# Patient Record
Sex: MALE | Race: WHITE
[De-identification: names, ages, dates, MRNs, and addresses within clinical notes are randomized per-mention and may not be internally consistent; named-entity substitution may affect disease eponyms.]

---

## 2017-01-09 ENCOUNTER — HOSPITAL ENCOUNTER (OUTPATIENT)
Dept: HOSPITAL 47 - ORWHC2ENDO | Age: 77
Discharge: HOME | End: 2017-01-09
Attending: SURGERY
Payer: COMMERCIAL

## 2017-01-09 VITALS — SYSTOLIC BLOOD PRESSURE: 111 MMHG | DIASTOLIC BLOOD PRESSURE: 66 MMHG | HEART RATE: 62 BPM

## 2017-01-09 VITALS — BODY MASS INDEX: 28.5 KG/M2

## 2017-01-09 VITALS — RESPIRATION RATE: 16 BRPM | TEMPERATURE: 97 F

## 2017-01-09 DIAGNOSIS — F03.90: ICD-10-CM

## 2017-01-09 DIAGNOSIS — F41.0: ICD-10-CM

## 2017-01-09 DIAGNOSIS — F41.9: ICD-10-CM

## 2017-01-09 DIAGNOSIS — Z86.718: ICD-10-CM

## 2017-01-09 DIAGNOSIS — Z79.82: ICD-10-CM

## 2017-01-09 DIAGNOSIS — I10: ICD-10-CM

## 2017-01-09 DIAGNOSIS — E78.5: ICD-10-CM

## 2017-01-09 DIAGNOSIS — Z12.11: Primary | ICD-10-CM

## 2017-01-09 DIAGNOSIS — K21.9: ICD-10-CM

## 2017-01-09 DIAGNOSIS — Z95.5: ICD-10-CM

## 2017-01-09 DIAGNOSIS — Z99.89: ICD-10-CM

## 2017-01-09 DIAGNOSIS — Z79.51: ICD-10-CM

## 2017-01-09 DIAGNOSIS — K57.30: ICD-10-CM

## 2017-01-09 DIAGNOSIS — K64.4: ICD-10-CM

## 2017-01-09 DIAGNOSIS — G47.30: ICD-10-CM

## 2017-01-09 DIAGNOSIS — Z86.711: ICD-10-CM

## 2017-01-09 DIAGNOSIS — J45.909: ICD-10-CM

## 2017-01-09 DIAGNOSIS — I25.2: ICD-10-CM

## 2017-01-09 DIAGNOSIS — J44.9: ICD-10-CM

## 2017-01-09 DIAGNOSIS — M19.90: ICD-10-CM

## 2017-01-09 DIAGNOSIS — Z87.891: ICD-10-CM

## 2017-01-09 DIAGNOSIS — I25.10: ICD-10-CM

## 2017-01-09 DIAGNOSIS — Z79.899: ICD-10-CM

## 2017-01-09 DIAGNOSIS — E11.9: ICD-10-CM

## 2017-01-09 DIAGNOSIS — K64.8: ICD-10-CM

## 2017-01-09 DIAGNOSIS — Z79.84: ICD-10-CM

## 2017-01-09 DIAGNOSIS — Z79.891: ICD-10-CM

## 2017-01-09 DIAGNOSIS — F32.9: ICD-10-CM

## 2017-01-09 LAB
GLUCOSE BLD-MCNC: 103 MG/DL (ref 75–99)
GLUCOSE BLD-MCNC: 96 MG/DL (ref 75–99)

## 2017-01-09 PROCEDURE — 99153 MOD SED SAME PHYS/QHP EA: CPT

## 2017-01-09 NOTE — P.OP
Date of Procedure: 01/09/17


Preoperative Diagnosis: 


Screening colonoscopy


Postoperative Diagnosis: 


Diverticulosis





Hemorrhoids


Procedure(s) Performed: 


Colonoscopy


Anesthesia: DIANA


Surgeon: Eren Parr


Pathology: none sent


Condition: stable


Disposition: PACU


Description of Procedure: 


The patient's placed on the endoscopy table in the lateral position.  He 

received IV sedation.  Digital rectal exam was performed which revealed 

internal and external hemorrhoids.  The prostate was symmetric without nodules.

  The flexible colonoscope was then placed patient anus and passed throughout 

the entire colon.  The ileocecal valve was visualized.  The cecum, ascending 

and transverse colon appeared normal.  In the descending; there is moderate 

diverticular changes.  The scope was then brought back the rectum and this 

appeared normal.  Scope was withdrawn for patient.

## 2017-01-09 NOTE — P.GSHP
History of Present Illness


H&P Date: 01/09/17


Chief Complaint: Screening colonoscopy





This a 77-year-old male referred from Dr. Cobos.  Patient presents today for 

screening colonoscopy.  His last colonoscopy was a approximately 8 years ago.  

He denies any significant GI bleeds.





Past Medical History


Past Medical History: Asthma, Coronary Artery Disease (CAD), COPD, Diabetes 

Mellitus, Deep Vein Thrombosis (DVT), GERD/Reflux, Hyperlipidemia, Hypertension

, Memory Impairment, Myocardial Infarction (MI), Osteoarthritis (OA), Pneumonia

, Pulmonary Embolus (PE), Skin Disorder, Sleep Apnea/CPAP/BIPAP


Additional Past Medical History / Comment(s): Migraines.  KATHY PE and LT DVT 4/ 2016.  Bruises easily.  Hx Kidney Stones.  Dementia.  USES CPAP.


Last Myocardial Infarction Date:: 1999 AND 2000


History of Any Multi-Drug Resistant Organisms: None Reported


Past Surgical History: Heart Catheterization With Stent, Joint Replacement, 

Orthopedic Surgery


Additional Past Surgical History / Comment(s): 7 STENTS.  LT KNEE REPLACEMENT.  

LT ACHILLES TENDON REPAIR.  KATHY CATARACT


Past Anesthesia/Blood Transfusion Reactions: Previous Problems w/ Anesthesia


Additional Past Anesthesia/Blood Transfusion Reaction / Comment(s): DIFFICULTY 

WAKING UP.


Date of Last Stent Placement:: 2013


Past Psychological History: Anxiety, Depression, Panic Disorder


Additional Psychological History / Comment(s): SINCE 2015, GETS MILD DEPRESSION 

DURING WINTER MONTHS(SAD). Dementia


Smoking Status: Former smoker


Past Alcohol Use History: None Reported


Additional Past Alcohol Use History / Comment(s): SMOKED >40 YEARS, 3PPD, QUIT 

1989.


Past Drug Use History: None Reported





- Past Family History


  ** Father


Family Medical History: Dementia





  ** Mother


Family Medical History: No Reported History


Additional Family Medical History / Comment(s): QUAD BYPASS





Medications and Allergies


 Home Medications











 Medication  Instructions  Recorded  Confirmed  Type


 


Atenolol [Tenormin] 25 mg PO QAM 09/30/14 01/09/17 History


 


Famotidine 40 mg PO BID 09/30/14 01/09/17 History


 


Lisinopril [Zestril] 2.5 mg PO HS 09/30/14 01/09/17 History


 


metFORMIN HCL [Glucophage] 500 mg PO BID 09/30/14 01/09/17 History


 


HYDROcodone/APAP 7.5-325MG [Norco 1 tab PO QID PRN 02/22/16 01/09/17 History





7.5-325]    


 


ALPRAZolam [Xanax] 0.25 mg PO BID PRN 03/25/16 01/09/17 History


 


Aspirin 325 mg PO QAM 03/25/16 01/09/17 History


 


Sertraline HCl [Zoloft] 25 mg PO QAM 03/25/16 01/09/17 History


 


Albuterol Inhaler [Ventolin Hfa 1 puff INHALATION AS DIRECTED PRN 07/18/16 01/09 /17 History





Inhaler]    


 


Beclomethasone Dipropionate [Qvar 2 puff INHALATION BID 07/18/16 01/09/17 

History





80 mcg]    


 


Cyanocobalamin [Vitamin B-12] 500 mcg PO DAILY 07/18/16 01/09/17 History


 


Memantine [Namenda] 10 mg PO QAM 07/18/16 01/09/17 History


 


Lovastatin [Mevacor] 40 mg PO HS 09/19/16 01/09/17 History











 Allergies











Allergy/AdvReac Type Severity Reaction Status Date / Time


 


No Known Allergies Allergy   Verified 01/04/17 15:27














Surgical - Exam


 Vital Signs











Temp Pulse Resp BP Pulse Ox


 


 97.0 F L  62   16   113/69   98 


 


 01/09/17 07:19  01/09/17 07:19  01/09/17 07:19  01/09/17 07:19  01/09/17 07:19














- General


well developed, no distress





- Eyes


PERRL





- ENT


normal pinna





- Neck


no masses





- Respiratory


normal expansion





- Cardiovascular


Rhythm: regular





- Abdomen


Abdomen: soft, non tender





Results





- Labs


 Abnormal Lab Results - Last 24 Hours (Table)











  01/09/17 Range/Units





  07:24 


 


POC Glucose (mg/dL)  103 H  (75-99)  mg/dL














Assessment and Plan


Plan: 





We will perform screening colonoscopy.

## 2017-03-06 ENCOUNTER — HOSPITAL ENCOUNTER (OUTPATIENT)
Dept: HOSPITAL 47 - LABWHC1 | Age: 77
End: 2017-03-06
Payer: COMMERCIAL

## 2017-03-06 DIAGNOSIS — E78.5: ICD-10-CM

## 2017-03-06 DIAGNOSIS — F03.91: ICD-10-CM

## 2017-03-06 DIAGNOSIS — E11.65: Primary | ICD-10-CM

## 2017-03-06 DIAGNOSIS — I25.10: ICD-10-CM

## 2017-03-06 LAB
ALP SERPL-CCNC: 57 U/L (ref 38–126)
ALT SERPL-CCNC: 39 U/L (ref 21–72)
ANION GAP SERPL CALC-SCNC: 13 MMOL/L
AST SERPL-CCNC: 28 U/L (ref 17–59)
BASOPHILS # BLD AUTO: 0.1 K/UL (ref 0–0.2)
BASOPHILS NFR BLD AUTO: 1 %
BUN SERPL-SCNC: 15 MG/DL (ref 9–20)
CALCIUM SPEC-MCNC: 10 MG/DL (ref 8.4–10.2)
CH: 29.2
CHCM: 31.7
CHLORIDE SERPL-SCNC: 105 MMOL/L (ref 98–107)
CHOLEST SERPL-MCNC: 138 MG/DL (ref ?–200)
CK SERPL-CCNC: 110 U/L (ref 55–170)
CO2 SERPL-SCNC: 28 MMOL/L (ref 22–30)
EOSINOPHIL # BLD AUTO: 0.3 K/UL (ref 0–0.7)
EOSINOPHIL NFR BLD AUTO: 4 %
ERYTHROCYTE [DISTWIDTH] IN BLOOD BY AUTOMATED COUNT: 5.54 M/UL (ref 4.3–5.9)
ERYTHROCYTE [DISTWIDTH] IN BLOOD: 13.6 % (ref 11.5–15.5)
GLUCOSE SERPL-MCNC: 104 MG/DL (ref 74–99)
HCT VFR BLD AUTO: 51.3 % (ref 39–53)
HDLC SERPL-MCNC: 38 MG/DL (ref 40–60)
HDW: 2.64
HEMOGLOBIN A1C: 5.7 % (ref 4.2–6.1)
HGB BLD-MCNC: 15.8 GM/DL (ref 13–17.5)
LUC NFR BLD AUTO: 2 %
LYMPHOCYTES # SPEC AUTO: 1.7 K/UL (ref 1–4.8)
LYMPHOCYTES NFR SPEC AUTO: 23 %
MAGNESIUM SPEC-SCNC: 2.1 MG/DL (ref 1.6–2.3)
MCH RBC QN AUTO: 28.5 PG (ref 25–35)
MCHC RBC AUTO-ENTMCNC: 30.9 G/DL (ref 31–37)
MCV RBC AUTO: 92.5 FL (ref 80–100)
MONOCYTES # BLD AUTO: 0.5 K/UL (ref 0–1)
MONOCYTES NFR BLD AUTO: 8 %
NEUTROPHILS # BLD AUTO: 4.5 K/UL (ref 1.3–7.7)
NEUTROPHILS NFR BLD AUTO: 62 %
NON-AFRICAN AMERICAN GFR(MDRD): >60
PH UR: 5 [PH] (ref 5–8)
POTASSIUM SERPL-SCNC: 5 MMOL/L (ref 3.5–5.1)
PROT SERPL-MCNC: 7.2 G/DL (ref 6.3–8.2)
SODIUM SERPL-SCNC: 146 MMOL/L (ref 137–145)
SP GR UR: 1.01 (ref 1–1.03)
TRIGL SERPL-MCNC: 204 MG/DL (ref ?–150)
UA BILLING (MACRO VS. MICRO): (no result)
UROBILINOGEN UR QL STRIP: <2 MG/DL (ref ?–2)
VIT B12 SERPL-MCNC: 744 PG/ML
WBC # BLD AUTO: 0.16 10*3/UL
WBC # BLD AUTO: 7.3 K/UL (ref 3.8–10.6)
WBC (PEROX): 7.43

## 2017-03-06 PROCEDURE — 82043 UR ALBUMIN QUANTITATIVE: CPT

## 2017-03-06 PROCEDURE — 85025 COMPLETE CBC W/AUTO DIFF WBC: CPT

## 2017-03-06 PROCEDURE — 82607 VITAMIN B-12: CPT

## 2017-03-06 PROCEDURE — 80061 LIPID PANEL: CPT

## 2017-03-06 PROCEDURE — 84443 ASSAY THYROID STIM HORMONE: CPT

## 2017-03-06 PROCEDURE — 82306 VITAMIN D 25 HYDROXY: CPT

## 2017-03-06 PROCEDURE — 83735 ASSAY OF MAGNESIUM: CPT

## 2017-03-06 PROCEDURE — 82550 ASSAY OF CK (CPK): CPT

## 2017-03-06 PROCEDURE — 36415 COLL VENOUS BLD VENIPUNCTURE: CPT

## 2017-03-06 PROCEDURE — 80053 COMPREHEN METABOLIC PANEL: CPT

## 2017-03-06 PROCEDURE — 81003 URINALYSIS AUTO W/O SCOPE: CPT

## 2017-03-06 PROCEDURE — 83036 HEMOGLOBIN GLYCOSYLATED A1C: CPT

## 2017-08-28 ENCOUNTER — HOSPITAL ENCOUNTER (OUTPATIENT)
Dept: HOSPITAL 47 - LABWHC1 | Age: 77
Discharge: HOME | End: 2017-08-28
Payer: COMMERCIAL

## 2017-08-28 DIAGNOSIS — E78.5: Primary | ICD-10-CM

## 2017-08-28 DIAGNOSIS — M54.12: ICD-10-CM

## 2017-08-28 DIAGNOSIS — N40.0: ICD-10-CM

## 2017-08-28 DIAGNOSIS — J44.9: ICD-10-CM

## 2017-08-28 DIAGNOSIS — E11.65: ICD-10-CM

## 2017-08-28 LAB
ALP SERPL-CCNC: 65 U/L (ref 38–126)
ALT SERPL-CCNC: 39 U/L (ref 21–72)
ANION GAP SERPL CALC-SCNC: 10 MMOL/L
AST SERPL-CCNC: 32 U/L (ref 17–59)
BASOPHILS # BLD AUTO: 0.1 K/UL (ref 0–0.2)
BASOPHILS NFR BLD AUTO: 1 %
BUN SERPL-SCNC: 18 MG/DL (ref 9–20)
CALCIUM SPEC-MCNC: 9.5 MG/DL (ref 8.4–10.2)
CH: 30.3
CHCM: 32.8
CHLORIDE SERPL-SCNC: 108 MMOL/L (ref 98–107)
CHOLEST SERPL-MCNC: 137 MG/DL (ref ?–200)
CK SERPL-CCNC: 202 U/L (ref 55–170)
CO2 SERPL-SCNC: 25 MMOL/L (ref 22–30)
EOSINOPHIL # BLD AUTO: 0.3 K/UL (ref 0–0.7)
EOSINOPHIL NFR BLD AUTO: 5 %
ERYTHROCYTE [DISTWIDTH] IN BLOOD BY AUTOMATED COUNT: 5.04 M/UL (ref 4.3–5.9)
ERYTHROCYTE [DISTWIDTH] IN BLOOD: 13.7 % (ref 11.5–15.5)
GLUCOSE SERPL-MCNC: 108 MG/DL (ref 74–99)
HCT VFR BLD AUTO: 46.9 % (ref 39–53)
HDLC SERPL-MCNC: 35 MG/DL (ref 40–60)
HDW: 2.71
HEMOGLOBIN A1C: 5.9 % (ref 4.2–6.1)
HGB BLD-MCNC: 14.5 GM/DL (ref 13–17.5)
LUC NFR BLD AUTO: 2 %
LYMPHOCYTES # SPEC AUTO: 1.5 K/UL (ref 1–4.8)
LYMPHOCYTES NFR SPEC AUTO: 24 %
MAGNESIUM SPEC-SCNC: 2 MG/DL (ref 1.6–2.3)
MCH RBC QN AUTO: 28.8 PG (ref 25–35)
MCHC RBC AUTO-ENTMCNC: 31 G/DL (ref 31–37)
MCV RBC AUTO: 93 FL (ref 80–100)
MONOCYTES # BLD AUTO: 0.5 K/UL (ref 0–1)
MONOCYTES NFR BLD AUTO: 8 %
NEUTROPHILS # BLD AUTO: 3.8 K/UL (ref 1.3–7.7)
NEUTROPHILS NFR BLD AUTO: 60 %
NON-AFRICAN AMERICAN GFR(MDRD): >60
POTASSIUM SERPL-SCNC: 4.6 MMOL/L (ref 3.5–5.1)
PROT SERPL-MCNC: 6.5 G/DL (ref 6.3–8.2)
PSA SERPL-MCNC: 0.21 NG/ML (ref 0–4)
SODIUM SERPL-SCNC: 143 MMOL/L (ref 137–145)
URATE SERPL-MCNC: 7.4 MG/DL (ref 3.5–8.5)
WBC # BLD AUTO: 0.14 10*3/UL
WBC # BLD AUTO: 6.4 K/UL (ref 3.8–10.6)
WBC (PEROX): 6.4

## 2017-08-28 PROCEDURE — 84153 ASSAY OF PSA TOTAL: CPT

## 2017-08-28 PROCEDURE — 83036 HEMOGLOBIN GLYCOSYLATED A1C: CPT

## 2017-08-28 PROCEDURE — 80061 LIPID PANEL: CPT

## 2017-08-28 PROCEDURE — 84443 ASSAY THYROID STIM HORMONE: CPT

## 2017-08-28 PROCEDURE — 85025 COMPLETE CBC W/AUTO DIFF WBC: CPT

## 2017-08-28 PROCEDURE — 82550 ASSAY OF CK (CPK): CPT

## 2017-08-28 PROCEDURE — 80053 COMPREHEN METABOLIC PANEL: CPT

## 2017-08-28 PROCEDURE — 84550 ASSAY OF BLOOD/URIC ACID: CPT

## 2017-08-28 PROCEDURE — 36415 COLL VENOUS BLD VENIPUNCTURE: CPT

## 2017-08-28 PROCEDURE — 83735 ASSAY OF MAGNESIUM: CPT

## 2017-08-28 PROCEDURE — 84439 ASSAY OF FREE THYROXINE: CPT

## 2018-04-09 ENCOUNTER — HOSPITAL ENCOUNTER (OUTPATIENT)
Dept: HOSPITAL 47 - LABWHC1 | Age: 78
Discharge: HOME | End: 2018-04-09
Payer: COMMERCIAL

## 2018-04-09 DIAGNOSIS — E78.5: ICD-10-CM

## 2018-04-09 DIAGNOSIS — E11.65: ICD-10-CM

## 2018-04-09 DIAGNOSIS — F01.50: ICD-10-CM

## 2018-04-09 DIAGNOSIS — N40.0: ICD-10-CM

## 2018-04-09 DIAGNOSIS — Z00.00: Primary | ICD-10-CM

## 2018-04-09 DIAGNOSIS — I10: ICD-10-CM

## 2018-04-09 LAB
ALBUMIN SERPL-MCNC: 4.7 G/DL (ref 3.5–5)
ALP SERPL-CCNC: 70 U/L (ref 38–126)
ALT SERPL-CCNC: 45 U/L (ref 21–72)
ANION GAP SERPL CALC-SCNC: 16 MMOL/L
AST SERPL-CCNC: 36 U/L (ref 17–59)
BASOPHILS # BLD AUTO: 0.1 K/UL (ref 0–0.2)
BASOPHILS NFR BLD AUTO: 1 %
BUN SERPL-SCNC: 21 MG/DL (ref 9–20)
CALCIUM SPEC-MCNC: 10.4 MG/DL (ref 8.4–10.2)
CHLORIDE SERPL-SCNC: 104 MMOL/L (ref 98–107)
CHOLEST SERPL-MCNC: 135 MG/DL (ref ?–200)
CK SERPL-CCNC: 199 U/L (ref 55–170)
CO2 SERPL-SCNC: 27 MMOL/L (ref 22–30)
EOSINOPHIL # BLD AUTO: 0.3 K/UL (ref 0–0.7)
EOSINOPHIL NFR BLD AUTO: 4 %
ERYTHROCYTE [DISTWIDTH] IN BLOOD BY AUTOMATED COUNT: 5.33 M/UL (ref 4.3–5.9)
ERYTHROCYTE [DISTWIDTH] IN BLOOD: 13.4 % (ref 11.5–15.5)
GLUCOSE SERPL-MCNC: 105 MG/DL (ref 74–99)
HBA1C MFR BLD: 5.7 % (ref 4–6)
HCT VFR BLD AUTO: 47.2 % (ref 39–53)
HDLC SERPL-MCNC: 39 MG/DL (ref 40–60)
HGB BLD-MCNC: 15.1 GM/DL (ref 13–17.5)
LDLC SERPL CALC-MCNC: 65 MG/DL (ref 0–99)
LYMPHOCYTES # SPEC AUTO: 1.7 K/UL (ref 1–4.8)
LYMPHOCYTES NFR SPEC AUTO: 25 %
MCH RBC QN AUTO: 28.3 PG (ref 25–35)
MCHC RBC AUTO-ENTMCNC: 31.9 G/DL (ref 31–37)
MCV RBC AUTO: 88.6 FL (ref 80–100)
MONOCYTES # BLD AUTO: 0.5 K/UL (ref 0–1)
MONOCYTES NFR BLD AUTO: 8 %
NEUTROPHILS # BLD AUTO: 4.1 K/UL (ref 1.3–7.7)
NEUTROPHILS NFR BLD AUTO: 61 %
PLATELET # BLD AUTO: 213 K/UL (ref 150–450)
POTASSIUM SERPL-SCNC: 5.7 MMOL/L (ref 3.5–5.1)
PROT SERPL-MCNC: 7.2 G/DL (ref 6.3–8.2)
PSA SERPL-MCNC: 0.27 NG/ML (ref 0–4)
SODIUM SERPL-SCNC: 147 MMOL/L (ref 137–145)
T4 FREE SERPL-MCNC: 1.01 NG/DL (ref 0.78–2.19)
TRIGL SERPL-MCNC: 153 MG/DL (ref ?–150)
VIT B12 SERPL-MCNC: 685 PG/ML (ref 200–944)
WBC # BLD AUTO: 6.8 K/UL (ref 3.8–10.6)

## 2018-04-09 PROCEDURE — 82607 VITAMIN B-12: CPT

## 2018-04-09 PROCEDURE — 82550 ASSAY OF CK (CPK): CPT

## 2018-04-09 PROCEDURE — 82306 VITAMIN D 25 HYDROXY: CPT

## 2018-04-09 PROCEDURE — 80053 COMPREHEN METABOLIC PANEL: CPT

## 2018-04-09 PROCEDURE — 83036 HEMOGLOBIN GLYCOSYLATED A1C: CPT

## 2018-04-09 PROCEDURE — 85025 COMPLETE CBC W/AUTO DIFF WBC: CPT

## 2018-04-09 PROCEDURE — 84439 ASSAY OF FREE THYROXINE: CPT

## 2018-04-09 PROCEDURE — 36415 COLL VENOUS BLD VENIPUNCTURE: CPT

## 2018-04-09 PROCEDURE — 84153 ASSAY OF PSA TOTAL: CPT

## 2018-04-09 PROCEDURE — 84443 ASSAY THYROID STIM HORMONE: CPT

## 2018-04-09 PROCEDURE — 80061 LIPID PANEL: CPT

## 2018-07-22 ENCOUNTER — HOSPITAL ENCOUNTER (OUTPATIENT)
Dept: HOSPITAL 47 - EC | Age: 78
Setting detail: OBSERVATION
LOS: 1 days | Discharge: HOME | End: 2018-07-23
Attending: INTERNAL MEDICINE | Admitting: INTERNAL MEDICINE
Payer: COMMERCIAL

## 2018-07-22 VITALS — BODY MASS INDEX: 31.9 KG/M2

## 2018-07-22 DIAGNOSIS — R00.0: ICD-10-CM

## 2018-07-22 DIAGNOSIS — G47.30: ICD-10-CM

## 2018-07-22 DIAGNOSIS — Z79.899: ICD-10-CM

## 2018-07-22 DIAGNOSIS — F01.50: ICD-10-CM

## 2018-07-22 DIAGNOSIS — Z87.01: ICD-10-CM

## 2018-07-22 DIAGNOSIS — I11.9: ICD-10-CM

## 2018-07-22 DIAGNOSIS — E11.9: ICD-10-CM

## 2018-07-22 DIAGNOSIS — Z95.5: ICD-10-CM

## 2018-07-22 DIAGNOSIS — Z79.84: ICD-10-CM

## 2018-07-22 DIAGNOSIS — I25.10: ICD-10-CM

## 2018-07-22 DIAGNOSIS — Z79.51: ICD-10-CM

## 2018-07-22 DIAGNOSIS — Z86.718: ICD-10-CM

## 2018-07-22 DIAGNOSIS — Z79.82: ICD-10-CM

## 2018-07-22 DIAGNOSIS — R09.02: ICD-10-CM

## 2018-07-22 DIAGNOSIS — Z87.442: ICD-10-CM

## 2018-07-22 DIAGNOSIS — R07.2: ICD-10-CM

## 2018-07-22 DIAGNOSIS — R41.3: ICD-10-CM

## 2018-07-22 DIAGNOSIS — M19.90: ICD-10-CM

## 2018-07-22 DIAGNOSIS — F33.9: ICD-10-CM

## 2018-07-22 DIAGNOSIS — G43.909: ICD-10-CM

## 2018-07-22 DIAGNOSIS — R06.02: ICD-10-CM

## 2018-07-22 DIAGNOSIS — R07.89: Primary | ICD-10-CM

## 2018-07-22 DIAGNOSIS — R06.00: ICD-10-CM

## 2018-07-22 DIAGNOSIS — J44.9: ICD-10-CM

## 2018-07-22 DIAGNOSIS — Z99.89: ICD-10-CM

## 2018-07-22 DIAGNOSIS — I25.2: ICD-10-CM

## 2018-07-22 DIAGNOSIS — R79.89: ICD-10-CM

## 2018-07-22 DIAGNOSIS — Z86.711: ICD-10-CM

## 2018-07-22 DIAGNOSIS — E87.2: ICD-10-CM

## 2018-07-22 DIAGNOSIS — Z87.891: ICD-10-CM

## 2018-07-22 DIAGNOSIS — F41.1: ICD-10-CM

## 2018-07-22 DIAGNOSIS — F41.0: ICD-10-CM

## 2018-07-22 DIAGNOSIS — Z79.1: ICD-10-CM

## 2018-07-22 DIAGNOSIS — K21.9: ICD-10-CM

## 2018-07-22 DIAGNOSIS — E78.5: ICD-10-CM

## 2018-07-22 LAB
ALBUMIN SERPL-MCNC: 4.3 G/DL (ref 3.5–5)
ALP SERPL-CCNC: 58 U/L (ref 38–126)
ALT SERPL-CCNC: 53 U/L (ref 21–72)
ANION GAP SERPL CALC-SCNC: 11 MMOL/L
APTT BLD: 23.3 SEC (ref 22–30)
AST SERPL-CCNC: 43 U/L (ref 17–59)
BASOPHILS # BLD AUTO: 0.1 K/UL (ref 0–0.2)
BASOPHILS NFR BLD AUTO: 1 %
BUN SERPL-SCNC: 22 MG/DL (ref 9–20)
CALCIUM SPEC-MCNC: 9.5 MG/DL (ref 8.4–10.2)
CHLORIDE SERPL-SCNC: 109 MMOL/L (ref 98–107)
CHOLEST SERPL-MCNC: 117 MG/DL (ref ?–200)
CK SERPL-CCNC: 165 U/L (ref 55–170)
CK SERPL-CCNC: 177 U/L (ref 55–170)
CK SERPL-CCNC: 221 U/L (ref 55–170)
CO2 SERPL-SCNC: 19 MMOL/L (ref 22–30)
EOSINOPHIL # BLD AUTO: 0.3 K/UL (ref 0–0.7)
EOSINOPHIL NFR BLD AUTO: 4 %
ERYTHROCYTE [DISTWIDTH] IN BLOOD BY AUTOMATED COUNT: 4.98 M/UL (ref 4.3–5.9)
ERYTHROCYTE [DISTWIDTH] IN BLOOD: 13.3 % (ref 11.5–15.5)
GLUCOSE BLD-MCNC: 126 MG/DL (ref 75–99)
GLUCOSE BLD-MCNC: 79 MG/DL (ref 75–99)
GLUCOSE SERPL-MCNC: 106 MG/DL (ref 74–99)
HCT VFR BLD AUTO: 43.9 % (ref 39–53)
HDLC SERPL-MCNC: 30 MG/DL (ref 40–60)
HGB BLD-MCNC: 14.4 GM/DL (ref 13–17.5)
INR PPP: 1.1 (ref ?–1.2)
LDLC SERPL CALC-MCNC: 59 MG/DL (ref 0–99)
LIPASE SERPL-CCNC: 168 U/L (ref 23–300)
LYMPHOCYTES # SPEC AUTO: 1.8 K/UL (ref 1–4.8)
LYMPHOCYTES NFR SPEC AUTO: 26 %
MAGNESIUM SPEC-SCNC: 2.1 MG/DL (ref 1.6–2.3)
MCH RBC QN AUTO: 29 PG (ref 25–35)
MCHC RBC AUTO-ENTMCNC: 32.9 G/DL (ref 31–37)
MCV RBC AUTO: 88.2 FL (ref 80–100)
MONOCYTES # BLD AUTO: 0.6 K/UL (ref 0–1)
MONOCYTES NFR BLD AUTO: 8 %
NEUTROPHILS # BLD AUTO: 4 K/UL (ref 1.3–7.7)
NEUTROPHILS NFR BLD AUTO: 58 %
PLATELET # BLD AUTO: 188 K/UL (ref 150–450)
POTASSIUM SERPL-SCNC: 4.8 MMOL/L (ref 3.5–5.1)
PROT SERPL-MCNC: 6.6 G/DL (ref 6.3–8.2)
PT BLD: 10.3 SEC (ref 9–12)
SODIUM SERPL-SCNC: 139 MMOL/L (ref 137–145)
TRIGL SERPL-MCNC: 138 MG/DL (ref ?–150)
TROPONIN I SERPL-MCNC: <0.012 NG/ML (ref 0–0.03)
WBC # BLD AUTO: 6.9 K/UL (ref 3.8–10.6)

## 2018-07-22 PROCEDURE — 80053 COMPREHEN METABOLIC PANEL: CPT

## 2018-07-22 PROCEDURE — 36415 COLL VENOUS BLD VENIPUNCTURE: CPT

## 2018-07-22 PROCEDURE — 93005 ELECTROCARDIOGRAM TRACING: CPT

## 2018-07-22 PROCEDURE — 83735 ASSAY OF MAGNESIUM: CPT

## 2018-07-22 PROCEDURE — 99285 EMERGENCY DEPT VISIT HI MDM: CPT

## 2018-07-22 PROCEDURE — 96376 TX/PRO/DX INJ SAME DRUG ADON: CPT

## 2018-07-22 PROCEDURE — 83690 ASSAY OF LIPASE: CPT

## 2018-07-22 PROCEDURE — 85730 THROMBOPLASTIN TIME PARTIAL: CPT

## 2018-07-22 PROCEDURE — 96366 THER/PROPH/DIAG IV INF ADDON: CPT

## 2018-07-22 PROCEDURE — 93306 TTE W/DOPPLER COMPLETE: CPT

## 2018-07-22 PROCEDURE — 85610 PROTHROMBIN TIME: CPT

## 2018-07-22 PROCEDURE — 83036 HEMOGLOBIN GLYCOSYLATED A1C: CPT

## 2018-07-22 PROCEDURE — 71275 CT ANGIOGRAPHY CHEST: CPT

## 2018-07-22 PROCEDURE — 85025 COMPLETE CBC W/AUTO DIFF WBC: CPT

## 2018-07-22 PROCEDURE — 80061 LIPID PANEL: CPT

## 2018-07-22 PROCEDURE — 82553 CREATINE MB FRACTION: CPT

## 2018-07-22 PROCEDURE — 84484 ASSAY OF TROPONIN QUANT: CPT

## 2018-07-22 PROCEDURE — 93351 STRESS TTE COMPLETE: CPT

## 2018-07-22 PROCEDURE — 96365 THER/PROPH/DIAG IV INF INIT: CPT

## 2018-07-22 PROCEDURE — 82550 ASSAY OF CK (CPK): CPT

## 2018-07-22 PROCEDURE — 71046 X-RAY EXAM CHEST 2 VIEWS: CPT

## 2018-07-22 PROCEDURE — 96361 HYDRATE IV INFUSION ADD-ON: CPT

## 2018-07-22 PROCEDURE — 83880 ASSAY OF NATRIURETIC PEPTIDE: CPT

## 2018-07-22 PROCEDURE — 85379 FIBRIN DEGRADATION QUANT: CPT

## 2018-07-22 RX ADMIN — MEMANTINE HYDROCHLORIDE SCH MG: 10 TABLET ORAL at 22:09

## 2018-07-22 RX ADMIN — FAMOTIDINE SCH MG: 20 TABLET, FILM COATED ORAL at 22:10

## 2018-07-22 RX ADMIN — INSULIN ASPART SCH: 100 INJECTION, SOLUTION INTRAVENOUS; SUBCUTANEOUS at 22:09

## 2018-07-22 RX ADMIN — INSULIN ASPART SCH: 100 INJECTION, SOLUTION INTRAVENOUS; SUBCUTANEOUS at 17:07

## 2018-07-22 NOTE — XR
EXAMINATION TYPE: XR chest 2V

 

DATE OF EXAM: 7/22/2018

 

HISTORY: Chest Pain.

 

REFERENCE: Previous study dated 12/14/2017.

 

FINDINGS: The heart is mildly prominent. The lungs are clear. Pleural spaces are clear.

 

IMPRESSION: 

 

MILD CARDIOMEGALY.

## 2018-07-22 NOTE — P.HPIM
History of Present Illness


H&P Date: 18


Chief Complaint: Chest pain





This is a 78-year-old  male one of Dr. Cobos with a previous medical 

history significant for CAD post-PCI and multiple stent placement started in 

 with a total of 7 stents, under the care of cardiology and regular basis 

with Dr. Dias he was seen not long ago he was supposed to go for a stress test, 

history of hypertension and hypertensive cardiovascular disease, diabetes 

mellitus type 2, COPD, hyperlipidemia, bilateral pulmonary embolism, left DVT 

and osteoarthritis, patient stated that a few days ago he developed to have 

some chest pain but he did not do anything about it however today he went to 

Spiritism in the morning and he was supposed to the pressure work however he felt 

that he is not able to do the job he was trying to carry her coffee to the 

second floor and he felt extremely weak and he dropped her coffee and he went 

down and he sat on the bench and the asked his wife to take him home couple of 

people in the Spiritism helped him to the car and while he was in the common 

developed to have a significant left-sided chest pain with significant 

shortness breath she had nitroglycerin on board she given 1 dose and he felt a 

lot better patient was seen in the ER at [facility] EKG showed normal 

sinus rhythm with no acute ST-T wave changes, cardiac enzymes are negative 

however because of her presentation he was admitted to the hospital for 

evaluation cardiology consultation was obtained.





Review of Systems


Constitutional: Denies anorexia, Denies chronic headaches, Denies malaise, 

Denies weight gain


Eyes: denies blurred vision, denies bulging eye, denies decreased vision, 

denies diplopia


Ears: deny: decreased hearing


Ears, nose, mouth and throat: Denies dysphagia, Denies epistaxis, Denies neck 

lump, Denies sore throat


Cardiovascular: Reports chest pain, Reports decreased exercise tolerance, 

Reports dyspnea on exertion, Reports high blood pressure, Reports shortness of 

breath, Denies rapid heart beat, Denies syncope


Respiratory: Reports sleep apnea, Reports snoring, Denies congestion, Denies 

cough, Denies cough with sputum, Denies home oxygen, Denies wheezing


Gastrointestinal: Denies abdominal pain, Denies bloating, Denies heartburn, 

Denies melena, Denies nausea, Denies vomiting


Genitourinary: Reports nocturia, Denies dysuria


Musculoskeletal: Denies myalgias


Musculoskeletal: absent: ankle pain, ankle stiffness, ankle swelling, elbow pain

, elbow stiffness, elbow swelling, foot pain, foot stiffness, foot swelling, 

hand pain, hand stiffness, hand swelling, hip pain, hip stiffness, hip swelling

, knee pain, knee stiffness, knee swelling, shoulder pain, shoulder stiffness, 

shoulder swelling, wrist pain, wrist stiffness, wrist swelling


Integumentary: Denies pruritus, Denies rash


Neurological: Reports memory loss


Psychiatric: Reports anxiety, Reports depression, Denies sadness/tearfulness, 

Denies sleep disturbances, Denies suicidal ideation


Endocrine: Denies fatigue, Denies weight change





Past Medical History


Past Medical History: Asthma, Coronary Artery Disease (CAD), COPD, Diabetes 

Mellitus, Deep Vein Thrombosis (DVT), GERD/Reflux, Hyperlipidemia, Hypertension

, Memory Impairment, Myocardial Infarction (MI), Osteoarthritis (OA), Pneumonia

, Pulmonary Embolus (PE), Skin Disorder, Sleep Apnea/CPAP/BIPAP


Additional Past Medical History / Comment(s): Migraines.  KATHY PE and LT DVT 2016.  Bruises easily.  Hx Kidney Stones.  Dementia.  USES CPAP.


Last Myocardial Infarction Date::  AND 


History of Any Multi-Drug Resistant Organisms: None Reported


Past Surgical History: Heart Catheterization With Stent


Additional Past Surgical History / Comment(s): 7 STENTS.  LT KNEE REPLACEMENT.  

LT ACHILLES TENDON REPAIR.  EXC bilat CATARACT 16.


Past Anesthesia/Blood Transfusion Reactions: Previous Problems w/ Anesthesia


Additional Past Anesthesia/Blood Transfusion Reaction / Comment(s): DIFFICULTY 

WAKING UP.


Date of Last Stent Placement:: 


Past Psychological History: Anxiety, Depression, Panic Disorder


Smoking Status: Former smoker (Patient used to smoke about 4 pack every day 

started when he was 12 quit 23 years ago)


Past Alcohol Use History: None Reported (Patient quit drinking 30 years ago.)


Past Drug Use History: None Reported





- Past Family History


  ** Father


Family Medical History: Dementia (Father  at age of 80 at extended care 

facility from dementia.)





  ** Mother


Family Medical History: Coronary Artery Disease (CAD) (Mother  at age of 83 

from CAD/CABG.)


Additional Family Medical History / Comment(s): QUAD BYPASS





  ** Brother(s)


Family Medical History: Coronary Artery Disease (CAD) (Patient has 2 brothers 

one of them with a CAD the other one is 68-year-old no major medical problems.)





  ** Sister(s)


Family Medical History: No Reported History (Patient has one sister no major 

medical problems.  She works as a nurse in [place])





  ** Daughter(s)


Family Medical History: No Reported History (Patient has one daughter no major 

problem)





  ** Son(s)


Family Medical History: No Reported History (Patient has one son no major 

medical problems.)





Medications and Allergies


 Home Medications











 Medication  Instructions  Recorded  Confirmed  Type


 


Atenolol [Tenormin] 25 mg PO QAM 14 History


 


Famotidine 40 mg PO BID 14 History


 


Lisinopril [Zestril] 2.5 mg PO HS 14 History


 


metFORMIN HCL [Glucophage] 500 mg PO BID 14 History


 


HYDROcodone/APAP 7.5-325MG [Norco 1 tab PO QID PRN 16 History





7.5-325]    


 


ALPRAZolam [Xanax] 0.25 mg PO BID PRN 16 History


 


Aspirin 325 mg PO QAM 16 History


 


Sertraline HCl [Zoloft] 50 mg PO HS 16 History


 


Albuterol Inhaler [Ventolin Hfa 1 puff INHALATION RT-Q6H PRN 16 

History





Inhaler]    


 


Beclomethasone Dipropionate [Qvar 2 puff INHALATION RT-BID 16 

History





80 mcg]    


 


Cyanocobalamin [Vitamin B-12] 500 mcg PO DAILY 16 History


 


Lovastatin [Mevacor] 40 mg PO HS 16 History


 


Cholecalciferol [Vitamin D3] 1,000 unit PO DAILY 17 History


 


Meloxicam 15 mg PO DAILY 17 History


 


Memantine HCl/Donepezil HCl 1 cap PO DAILY 17 History





[Namzaric 28 mg-10 mg Capsule]    


 


Montelukast [Singulair] 10 mg PO DAILY 17 History


 


Multivitamins, Thera [Multivitamin 1 tab PO DAILY 17 History





(formulary)]    


 


Ondansetron Odt [Zofran Odt] 4 mg PO Q12HR PRN #7 tab 17  Rx











 Allergies











Allergy/AdvReac Type Severity Reaction Status Date / Time


 


No Known Allergies Allergy   Verified 18 10:47














Physical Exam


Vitals: 


 Vital Signs











  Temp Pulse Resp BP Pulse Ox


 


 18 12:21   56 L  18  122/58  98


 


 18 10:45  98.7 F  53 L  16  144/70  98








 Intake and Output











 18





 22:59 06:59 14:59


 


Other:   


 


  Weight   95.254 kg














- Constitutional


General appearance: average body habitus, no acute distress





- EENT


Eyes: anicteric sclerae, EOMI, PERRLA, no ptosis, no scleral icterus, normal 

appearance


ENT: hearing grossly normal, NA/AT, normal oropharynx


Ears: bilateral: normal





- Neck


Neck: no lymphadenopathy, normal ROM, no rigidity, no stridor, no thyromegaly


Carotids: bilateral: upstroke delayed


Thyroid: bilateral: normal size





- Respiratory


Respiratory: bilateral: diminished, negative: dullness, rales, rhonchi, wheezing

, prolonged expiration, prolonged inspiration





- Cardiovascular


Rhythm: regular


Heart sounds: normal: S1, S2


Abnormal Heart Sounds: systolic murmur, no S3 Gallop, no S4 Gallop





- Gastrointestinal


General gastrointestinal: normal bowel sounds, no soft, no splenomegaly, no 

tenderness, no umbilical hernia, no ventral hernia





- Integumentary


Integumentary: normal, normal turgor





- Neurologic


Neurologic: CNII-XII intact





- Musculoskeletal


Musculoskeletal: gait normal, generalized weakness





- Psychiatric


Psychiatric: A&O x's 3, appropriate affect, intact judgment & insight





Results


CBC & Chem 7: 


 18 10:43





 18 10:43


Labs: 


 Abnormal Lab Results - Last 24 Hours (Table)











  18 Range/Units





  10:34 10:43 10:43 


 


D-Dimer  0.75 H    (<0.60)  mg/L FEU


 


Chloride    109 H  ()  mmol/L


 


Carbon Dioxide    19 L  (22-30)  mmol/L


 


BUN    22 H  (9-20)  mg/dL


 


Glucose    106 H  (74-99)  mg/dL


 


Total Creatine Kinase   221 H   ()  U/L


 


CK-MB (CK-2)   3.7 H*   (0.0-2.4)  ng/mL














Thrombosis Risk Factor Assmnt





- DVT/VTE Prophylaxis


DVT/VTE Prophylaxis: Pharmacologic Prophylaxis ordered, Mechanical Prophylaxis 

ordered





Assessment and Plan


Assessment: 





Assessment and plan:








1.  Chest pain in patient with a history of CAD post-PCI.  Heparin drip, start 

the patient on aspirin 325 mg orally once every day, continue atenolol 25 mg 

orally once every day, continue with Lipitor 80 mg orally once every day, 

cardiac enzymes, cardiology consultation, if the cardiac enzymes are negative 

patient should go for a stress test tomorrow morning.





2.  CAD post-PCI.  Continue patient on atenolol, aspirin, statin.





3.  Hypertension and hypertensive cardiovascular disease.  Continue atenolol 25 

mg orally once every day, lisinopril 2.5 mg orally once every day.





4.  Hyperlipidemia.  Continue patient on statin.





5.  Diabetes mellitus type 2.  Continue metformin 500 mg orally twice every day

, blood glucose before each meal and at bedtime along with a sliding scale 

insulin.





6.  Vascular dementia.  Continue Namzaric 28/10 mg orally once every day.





7.  COPD.  Continue patient on the Qvar as well as Ventolin inhaler.





8.  History of bilateral pulmonary emboli and left DVT.  Resolved.





9.  Osteoarthritis.  Stable at this time.





10.  Anxiety.  Continue Xanax 0.5 mg orally twice every day.





11.  Depressive disorder.  Continue patient on sertraline 50 mg orally once 

every day.





12.  GERD.  Continue patient on Pepcid 40 mg orally twice every day.





13.  DVT prophylaxis.  Heparin drip.





14.  GI prophylaxis.  Continue with Pepcid.





15.  Patient is observation.

## 2018-07-22 NOTE — ED
General Adult HPI





- General


Chief complaint: Chest Pain


Stated complaint: Chest Pain


Source: patient


Mode of arrival: wheelchair


Limitations: no limitations





- History of Present Illness


Initial comments: 





Dictation was produced using dragon dictation software. please excuse any 

grammatical, word or spelling errors. 





Chief Complaint: 78-year-old  male presents with chief complaint of 

chest pain.





History of Present Illness: Patient is a 78-year-old  male past 

medical history of coronary artery disease, multiple coronary artery stents, 

DVTs and PEs presents with chief complaint of chest pain.  Patient states that 

he has been having chest pain since 1 day.  He was at [employer] when he was 

feeling weak.  He then began experiencing some chest pain.  He localizes the 

pain to his left anterior chest with radiation to the lateral chest.  Patient 

had history of PE 2 years ago.  He is not on any anticoagulation at this time.  

Denies any constitutional symptoms.  Baseline last night.








The ROS documented in this emergency department record has been reviewed and 

confirmed by me.  Those systems with pertinent positive or negative responses 

have been documented in the HPI.  All other systems are other negative and/or 

noncontributory.





- Related Data


 Home Medications











 Medication  Instructions  Recorded  Confirmed


 


Atenolol [Tenormin] 25 mg PO QAM 09/30/14 12/14/17


 


Famotidine 40 mg PO BID 09/30/14 12/14/17


 


Lisinopril [Zestril] 2.5 mg PO HS 09/30/14 12/14/17


 


metFORMIN HCL [Glucophage] 500 mg PO BID 09/30/14 12/14/17


 


HYDROcodone/APAP 7.5-325MG [Norco 1 tab PO QID PRN 02/22/16 12/14/17





7.5-325]   


 


ALPRAZolam [Xanax] 0.25 mg PO BID PRN 03/25/16 12/14/17


 


Aspirin 325 mg PO QAM 03/25/16 12/14/17


 


Sertraline HCl [Zoloft] 50 mg PO HS 03/25/16 12/14/17


 


Albuterol Inhaler [Ventolin Hfa 1 puff INHALATION RT-Q6H PRN 07/18/16 12/14/17





Inhaler]   


 


Beclomethasone Dipropionate [Qvar 2 puff INHALATION RT-BID 07/18/16 12/14/17





80 mcg]   


 


Cyanocobalamin [Vitamin B-12] 500 mcg PO DAILY 07/18/16 12/14/17


 


Lovastatin [Mevacor] 40 mg PO HS 09/19/16 12/14/17


 


Cholecalciferol [Vitamin D3] 1,000 unit PO DAILY 12/14/17 12/14/17


 


Meloxicam 15 mg PO DAILY 12/14/17 12/14/17


 


Memantine HCl/Donepezil HCl 1 cap PO DAILY 12/14/17 12/14/17





[Namzaric 28 mg-10 mg Capsule]   


 


Montelukast [Singulair] 10 mg PO DAILY 12/14/17 12/14/17


 


Multivitamins, Thera [Multivitamin 1 tab PO DAILY 12/14/17 12/14/17





(formulary)]   








 Previous Rx's











 Medication  Instructions  Recorded


 


Ondansetron Odt [Zofran Odt] 4 mg PO Q12HR PRN #7 tab 12/14/17











 Allergies











Allergy/AdvReac Type Severity Reaction Status Date / Time


 


No Known Allergies Allergy   Verified 07/22/18 10:47














Review of Systems


ROS Statement: 


Those systems with pertinent positive or pertinent negative responses have been 

documented in the HPI.





ROS Other: All systems not noted in ROS Statement are negative.





Past Medical History


Past Medical History: Asthma, Coronary Artery Disease (CAD), COPD, Diabetes 

Mellitus, Deep Vein Thrombosis (DVT), GERD/Reflux, Hyperlipidemia, Hypertension

, Memory Impairment, Myocardial Infarction (MI), Osteoarthritis (OA), Pneumonia

, Pulmonary Embolus (PE), Skin Disorder, Sleep Apnea/CPAP/BIPAP


Additional Past Medical History / Comment(s): Migraines.  KATHY PE and LT DVT 4/ 2016.  Bruises easily.  Hx Kidney Stones.  Dementia.  USES CPAP.


Last Myocardial Infarction Date:: 1999 AND 2000


History of Any Multi-Drug Resistant Organisms: None Reported


Past Surgical History: Heart Catheterization With Stent


Additional Past Surgical History / Comment(s): 7 STENTS.  LT KNEE REPLACEMENT.  

LT ACHILLES TENDON REPAIR.  EXC bilat CATARACT 7/19/16.


Past Anesthesia/Blood Transfusion Reactions: Previous Problems w/ Anesthesia


Additional Past Anesthesia/Blood Transfusion Reaction / Comment(s): DIFFICULTY 

WAKING UP.


Date of Last Stent Placement:: 2013


Past Psychological History: Anxiety, Depression, Panic Disorder


Smoking Status: Former smoker


Past Alcohol Use History: None Reported


Past Drug Use History: None Reported





- Past Family History


  ** Father


Family Medical History: Dementia





  ** Mother


Family Medical History: No Reported History


Additional Family Medical History / Comment(s): QUAD BYPASS





General Exam





- General Exam Comments


Initial Comments: 











PHYSICAL EXAM:


General Impression: Alert and oriented x3, acute distress secondary to pain


HEENT: Normocephalic atraumatic, extra-ocular movements intact, pupils equal 

and reactive to light bilaterally, mucous membranes moist.


Cardiovascular: Heart regular rate and rhythm, S1&S2 audible, no murmurs, rubs 

or gallops


Chest: Lungs clear to auscultation bilaterally, no rhonchi, no wheeze, no rales


Abdomen: Bowel sounds present, abdomen soft, non-tender, non-distended, no 

organomegaly


Musculoskeletal: Pulses present and equal in all extremities, no peripheral 

edema


Motor: Power 5/5 bilaterally, no focal deficits noted


Neurological: CN II-XII grossly intact, no focal motor or sensory deficits noted


Skin: Intact with no visualized rashes


Psych: Normal affect and mood


Limitations: no limitations





Course


 Vital Signs











  07/22/18 07/22/18





  10:45 12:21


 


Temperature 98.7 F 


 


Pulse Rate 53 L 56 L


 


Respiratory 16 18





Rate  


 


Blood Pressure 144/70 122/58


 


O2 Sat by Pulse 98 98





Oximetry  














Medical Decision Making





- Medical Decision Making











ED course: 78-year-old male with past medical history of coronary artery 

disease and pulmonary emboli presents with chest pain 1 day.  Vital signs upon 

arrival shows findings within acceptable limits.  Physical examination shows 

male appearing his stated age.  He does appear to be in acute distress.  Wife 

reports that she did give him one of her nitro's.  He states that it helped 

mildly.  Patient hypoxic or tachycardic.  He is on a beta blocker hour.  

Initial EKG did not show any signs of ischemia.  Repeat EKG performed 20 

minutes later did not show any changes.


Laboratory evaluation obtained.  CBC unremarkable.  Coag panel unremarkable.  D-

dimer 0.75.  Metabolic panel shows non-gap acidosis.  Troponin is negative.  

Chest x-ray obtained showing no acute processes.  There is evidence of mild 

cardiomegaly.  Given elevated d-dimer and CT angios obtained showing 

degenerative changes in the spine.  Radiologist did comment that this was a 

limited examination however no demonstration of large pulmonary emboli.  

Currently speaking I doubt that patient's symptoms are secondary to pulmonary 

emboli however there is possibility that there could be a small subsegmental 

emboli causing symptoms.  Regardless, patient is hemodynamically stable and not 

having any lower extremity symptoms without any findings of right heart strain 

on EKG.  Patient reevaluated found with stable medical condition.  Patient was 

agreeable to be admitted to observation with cardiology consult and serial 

cardiac enzymes.  Patient given aspirin.





EKG Interpretation:


A 12 lead EKG was obtained. It was interpreted by myself and attending 

physician. There is a P wave before every QRS complex. Rate is 56. Rhythm is 

sinus bradycardia, AK interval 192, , QTC 49. QT is not prolonged. No ST 

segment depression or elevation. This EKG was compared to a previous EKG  and 

showed no significant change. Overall, this EKG is unremarkable





- Lab Data


Result diagrams: 


 07/22/18 10:43





 07/22/18 10:43


 Lab Results











  07/22/18 07/22/18 07/22/18 Range/Units





  10:34 10:43 10:43 


 


WBC    6.9  (3.8-10.6)  k/uL


 


RBC    4.98  (4.30-5.90)  m/uL


 


Hgb    14.4  (13.0-17.5)  gm/dL


 


Hct    43.9  (39.0-53.0)  %


 


MCV    88.2  (80.0-100.0)  fL


 


MCH    29.0  (25.0-35.0)  pg


 


MCHC    32.9  (31.0-37.0)  g/dL


 


RDW    13.3  (11.5-15.5)  %


 


Plt Count    188  (150-450)  k/uL


 


Neutrophils %    58  %


 


Lymphocytes %    26  %


 


Monocytes %    8  %


 


Eosinophils %    4  %


 


Basophils %    1  %


 


Neutrophils #    4.0  (1.3-7.7)  k/uL


 


Lymphocytes #    1.8  (1.0-4.8)  k/uL


 


Monocytes #    0.6  (0-1.0)  k/uL


 


Eosinophils #    0.3  (0-0.7)  k/uL


 


Basophils #    0.1  (0-0.2)  k/uL


 


PT     (9.0-12.0)  sec


 


INR     (<1.2)  


 


APTT     (22.0-30.0)  sec


 


D-Dimer  0.75 H    (<0.60)  mg/L FEU


 


Sodium     (137-145)  mmol/L


 


Potassium     (3.5-5.1)  mmol/L


 


Chloride     ()  mmol/L


 


Carbon Dioxide     (22-30)  mmol/L


 


Anion Gap     mmol/L


 


BUN     (9-20)  mg/dL


 


Creatinine     (0.66-1.25)  mg/dL


 


Est GFR (CKD-EPI)AfAm     (>60 ml/min/1.73 sqM)  


 


Est GFR (CKD-EPI)NonAf     (>60 ml/min/1.73 sqM)  


 


Glucose     (74-99)  mg/dL


 


Calcium     (8.4-10.2)  mg/dL


 


Magnesium     (1.6-2.3)  mg/dL


 


Total Bilirubin     (0.2-1.3)  mg/dL


 


AST     (17-59)  U/L


 


ALT     (21-72)  U/L


 


Alkaline Phosphatase     ()  U/L


 


Total Creatine Kinase   221 H   ()  U/L


 


CK-MB (CK-2)   3.7 H*   (0.0-2.4)  ng/mL


 


CK-MB (CK-2) Rel Index   1.7   


 


Troponin I   <0.012   (0.000-0.034)  ng/mL


 


NT-Pro-B Natriuret Pep     pg/mL


 


Total Protein     (6.3-8.2)  g/dL


 


Albumin     (3.5-5.0)  g/dL


 


Lipase     ()  U/L














  07/22/18 07/22/18 07/22/18 Range/Units





  10:43 10:43 10:43 


 


WBC     (3.8-10.6)  k/uL


 


RBC     (4.30-5.90)  m/uL


 


Hgb     (13.0-17.5)  gm/dL


 


Hct     (39.0-53.0)  %


 


MCV     (80.0-100.0)  fL


 


MCH     (25.0-35.0)  pg


 


MCHC     (31.0-37.0)  g/dL


 


RDW     (11.5-15.5)  %


 


Plt Count     (150-450)  k/uL


 


Neutrophils %     %


 


Lymphocytes %     %


 


Monocytes %     %


 


Eosinophils %     %


 


Basophils %     %


 


Neutrophils #     (1.3-7.7)  k/uL


 


Lymphocytes #     (1.0-4.8)  k/uL


 


Monocytes #     (0-1.0)  k/uL


 


Eosinophils #     (0-0.7)  k/uL


 


Basophils #     (0-0.2)  k/uL


 


PT    10.3  (9.0-12.0)  sec


 


INR    1.1  (<1.2)  


 


APTT    23.3  (22.0-30.0)  sec


 


D-Dimer     (<0.60)  mg/L FEU


 


Sodium  139    (137-145)  mmol/L


 


Potassium  4.8    (3.5-5.1)  mmol/L


 


Chloride  109 H    ()  mmol/L


 


Carbon Dioxide  19 L    (22-30)  mmol/L


 


Anion Gap  11    mmol/L


 


BUN  22 H    (9-20)  mg/dL


 


Creatinine  0.80    (0.66-1.25)  mg/dL


 


Est GFR (CKD-EPI)AfAm  >90    (>60 ml/min/1.73 sqM)  


 


Est GFR (CKD-EPI)NonAf  87    (>60 ml/min/1.73 sqM)  


 


Glucose  106 H    (74-99)  mg/dL


 


Calcium  9.5    (8.4-10.2)  mg/dL


 


Magnesium  2.1    (1.6-2.3)  mg/dL


 


Total Bilirubin  1.0    (0.2-1.3)  mg/dL


 


AST  43    (17-59)  U/L


 


ALT  53    (21-72)  U/L


 


Alkaline Phosphatase  58    ()  U/L


 


Total Creatine Kinase     ()  U/L


 


CK-MB (CK-2)     (0.0-2.4)  ng/mL


 


CK-MB (CK-2) Rel Index     


 


Troponin I     (0.000-0.034)  ng/mL


 


NT-Pro-B Natriuret Pep   54   pg/mL


 


Total Protein  6.6    (6.3-8.2)  g/dL


 


Albumin  4.3    (3.5-5.0)  g/dL


 


Lipase  168    ()  U/L














Disposition


Clinical Impression: 


 Chest pain





Disposition: ADMITTED AS IP TO THIS HOSP


Condition: Fair


Instructions:  Chest Pain (ED)


Referrals: 


Elisa Cobos MD [Primary Care Provider] - 1-2 days


Decision Time: 13:45

## 2018-07-22 NOTE — CT
EXAMINATION TYPE: CT angio chest

 

DATE OF EXAM: 7/22/2018 12:49 PM

 

COMPARISON: Previous study dated 3/27/2016

 

HISTORY: Chest pain

 

CT DLP: 450.6 mGycm

Automated exposure control for dose reduction was used.

 

CONTRAST: 

CTA scan of the thorax is performed with IV Contrast, patient injected with 100 mL of Isovue 370, pul
monary embolism protocol. . 

 

FINDINGS:The lungs are clear.

 

There is no significant axillary or mediastinal adenopathy. There is some shotty adenopathy in the ri
ght hilum.

 

The contrast bolus is suboptimal. There are no large pulmonary emboli.

 

There is no pleural or pericardial fluid. The heart is not enlarged. The aorta is normal in caliber.

 

Limited views of the upper abdomen are unremarkable.

 

There is hypertrophic spondylosis within the spine.

 

IMPRESSION: 

1. LIMITED EXAMINATION DEMONSTRATING NO LARGE PULMONARY EMBOLI.

2. DEGENERATIVE CHANGES WITHIN THE SPINE.

## 2018-07-23 VITALS — RESPIRATION RATE: 16 BRPM

## 2018-07-23 VITALS — DIASTOLIC BLOOD PRESSURE: 69 MMHG | SYSTOLIC BLOOD PRESSURE: 118 MMHG | TEMPERATURE: 98.3 F | HEART RATE: 52 BPM

## 2018-07-23 LAB
GLUCOSE BLD-MCNC: 113 MG/DL (ref 75–99)
GLUCOSE BLD-MCNC: 97 MG/DL (ref 75–99)
HBA1C MFR BLD: 5.7 % (ref 4–6)

## 2018-07-23 RX ADMIN — MEMANTINE HYDROCHLORIDE SCH MG: 10 TABLET ORAL at 12:27

## 2018-07-23 RX ADMIN — INSULIN ASPART SCH: 100 INJECTION, SOLUTION INTRAVENOUS; SUBCUTANEOUS at 11:39

## 2018-07-23 RX ADMIN — INSULIN ASPART SCH: 100 INJECTION, SOLUTION INTRAVENOUS; SUBCUTANEOUS at 12:33

## 2018-07-23 RX ADMIN — FAMOTIDINE SCH MG: 20 TABLET, FILM COATED ORAL at 12:29

## 2018-07-23 NOTE — P.DS
Providers


Date of admission: 


07/22/18 13:40





Expected date of discharge: 07/23/18


Attending physician: 


Alex Goodrich





Consults: 





 





07/22/18 13:38


Consult Physician Stat 


   Consulting Provider: Angel Luis Acosta


   Consult Reason/Comments: chest pain


   Do you want consulting provider notified?: Yes, Notify in am











Primary care physician: 


Elisa Cobos





[facility] Course: 





This is a 78-year-old  male one of Dr. Cobos with a previous medical 

history significant for CAD post-PCI and multiple stent placement started in 

1999 with a total of 7 stents, under the care of cardiology and regular basis 

with Dr. Dias he was seen not long ago he was supposed to go for a stress test, 

history of hypertension and hypertensive cardiovascular disease, diabetes 

mellitus type 2, COPD, hyperlipidemia, bilateral pulmonary embolism, left DVT 

and osteoarthritis, patient stated that a few days ago he developed to have 

some chest pain but he did not do anything about it however today he went to 

[employer] in the morning and he was supposed to the pressure work however he felt 

that he is not able to do the job he was trying to carry her coffee to the 

second floor and he felt extremely weak and he dropped her coffee and he went 

down and he sat on the bench and the asked his wife to take him home couple of 

people in the [employer] helped him to the car and while he was in the common 

developed to have a significant left-sided chest pain with significant 

shortness breath she had nitroglycerin on board she given 1 dose and he felt a 

lot better patient was seen in the ER at [facility] EKG showed normal 

sinus rhythm with no acute ST-T wave changes, cardiac enzymes are negative 

however because of her presentation he was admitted to the hospital for 

evaluation cardiology consultation was obtained.





7/23: CTA of the chest was negative for pulmonary embolism.  Echocardiogram 

reveals EF of 60-65%, mild aortic valve sclerosis, mild tricuspid regurgitation.


Dobutamine stress test came back negative and patient will be discharged home 

today in stable condition.








Discharge diagnoses:


1.  Chest pain in patient with a history of CAD post-PCI.  


2.  CAD post-PCI. 


3.  Hypertension and hypertensive cardiovascular disease.  


4.  Hyperlipidemia.  


5.  Diabetes mellitus type 2.  


6.  Vascular dementia.  


7.  COPD.  


8.  History of bilateral pulmonary emboli and left DVT.  Resolved.


9.  Osteoarthritis.  


10.  Anxiety, generalized disorder 


11.  Depressive, recurrent


12.  GERD. 





Discharge plan: Return home


Impression and plan of care have been directed as dictated by the signing 

physician.  Sharon Walker nurse practitioner acting as scribe for signing 

physician.


Patient Condition at Discharge: Good





Plan - Discharge Summary


Discharge Rx Participant: No


New Discharge Prescriptions: 


Continue


   metFORMIN HCL [Glucophage] 500 mg PO BID


   Lisinopril [Zestril] 2.5 mg PO HS


   Famotidine 40 mg PO BID


   Atenolol [Tenormin] 25 mg PO QAM


   HYDROcodone/APAP 7.5-325MG [Norco 7.5-325] 1 tab PO QID PRN


     PRN Reason: Pain


   Aspirin 81 mg PO QAM


   Sertraline HCl [Zoloft] 50 mg PO HS


   Albuterol Inhaler [Ventolin Hfa Inhaler] 1 puff INHALATION RT-Q6H PRN


     PRN Reason: Shortness Of Breath


   Beclomethasone Dipropionate [Qvar 80 mcg] 2 puff INHALATION RT-BID


   Cyanocobalamin [Vitamin B-12] 500 mcg PO DAILY


   Lovastatin [Mevacor] 40 mg PO HS


   Multivitamins, Thera [Multivitamin (formulary)] 1 tab PO DAILY


   Montelukast [Singulair] 10 mg PO DAILY


   Cholecalciferol [Vitamin D3] 1,000 unit PO DAILY


   Meloxicam 15 mg PO DAILY


   Donepezil [Aricept] 10 mg PO HS


   Memantine [Namenda] 10 mg PO BID


Discharge Medication List





Atenolol [Tenormin] 25 mg PO QAM 09/30/14 [History]


Famotidine 40 mg PO BID 09/30/14 [History]


Lisinopril [Zestril] 2.5 mg PO HS 09/30/14 [History]


metFORMIN HCL [Glucophage] 500 mg PO BID 09/30/14 [History]


HYDROcodone/APAP 7.5-325MG [Norco 7.5-325] 1 tab PO QID PRN 02/22/16 [History]


Aspirin 81 mg PO QAM 03/25/16 [History]


Sertraline HCl [Zoloft] 50 mg PO HS 03/25/16 [History]


Albuterol Inhaler [Ventolin Hfa Inhaler] 1 puff INHALATION RT-Q6H PRN 07/18/16 [

History]


Beclomethasone Dipropionate [Qvar 80 mcg] 2 puff INHALATION RT-BID 07/18/16 [

History]


Cyanocobalamin [Vitamin B-12] 500 mcg PO DAILY 07/18/16 [History]


Lovastatin [Mevacor] 40 mg PO HS 09/19/16 [History]


Cholecalciferol [Vitamin D3] 1,000 unit PO DAILY 12/14/17 [History]


Meloxicam 15 mg PO DAILY 12/14/17 [History]


Montelukast [Singulair] 10 mg PO DAILY 12/14/17 [History]


Multivitamins, Thera [Multivitamin (formulary)] 1 tab PO DAILY 12/14/17 [History

]


Donepezil [Aricept] 10 mg PO HS 07/22/18 [History]


Memantine [Namenda] 10 mg PO BID 07/22/18 [History]








Follow up Appointment(s)/Referral(s): 


Elisa Cobos MD [Primary Care Provider] - 1 Week


Angel Luis Acosta MD [STAFF PHYSICIAN] - As Needed


Patient Instructions/Handouts:  Chest Pain (ED)


Discharge Disposition: HOME SELF-CARE

## 2018-07-23 NOTE — CONS
CONSULTATION



CHIEF COMPLAINT:

Chest pain.



Glenn is a 78-year-old gentleman with history of coronary artery disease, status post

prior angioplasty, COPD, history of pulmonary embolism, diabetes, hypertension, who

presented to hospital with fairly vague and nonspecific symptoms.  He states that

initially he was driving to [employer] and was not quite sure about the _____ in his car.

When he goes to the [employer] and he was not able to make coffee and in fact has dropped

his wife's coffee then as he was being helped to his car.  He had an episode of chest

discomfort and shortness of breath that responded to sublingual nitro.  He came to the

ER where EKG did not reveal acute ischemic changes.  Cardiac enzymes were negative and

he is admitted to hospital for further evaluation.  At the time of my evaluation this

morning, he is chest pain-free and hemodynamically stable.  Three sets of cardiac

enzymes are negative and did not have any further episodes of chest pain.  His D-dimer

was elevated on his initial presentation. A CT scan of the chest was negative for

pulmonary embolism.



PAST MEDICAL HISTORY:

Past medical history is significant for coronary artery disease, status post

angioplasty, hypertension, dyslipidemia, non-insulin-dependent diabetes, and dementia.



CURRENT MEDICATIONS:

Current medications include metformin 500 b.i.d., Zoloft 50 q. daily, Singulair,

Namenda, meloxicam, Mevacor 40 q. daily, Zestril 2.5 q. daily, Norco, Aricept, vitamin

B12, D3, Qvar, Tenormin, aspirin.



ALLERGIES:

There are no known drug allergies.



FAMILY HISTORY:

Family history is negative for premature coronary artery disease.



SOCIAL HISTORY:

Social history is negative for smoking, EtOH abuse or drug abuse.



REVIEW OF SYSTEMS:

HEENT is unremarkable.

CARDIAC: As described above.

RESPIRATORY: Negative.

GI: Negative.

GENITOURINARY: Negative.

ALLERGY/IMMUNOLOGY: Negative.

MUSCULOSKELETAL: Significant for arthritis.

PSYCHOSOCIAL: Negative.

ENDOCRINE:  Negative.

HEMATOLOGICAL:  Negative.

DERM:  Negative.

CONSTITUTIONAL:  Negative.

ONCOLOGICAL:  Negative.

CNS: Negative.



Rest of the system review is negative.



PHYSICAL EXAM:

On exam, patient is afebrile.  Vital signs are stable.  There is no jugular venous

distention.  Carotid upstroke is normal.  There is no bruit.

Chest exam reveals good air entry bilaterally.

Heart exam reveals first and second heart sounds.  No gallop.  No murmur.  No rub.

Abdomen is soft, nontender.

Examination of extremities did not reveal any edema.  Peripheral pulses are felt.



LABS:

Labs show that the hemoglobin is normal at 14.4, creatinine is 0.8. Three sets of

troponins are negative.  EKG does not reveal acute ischemic changes.



ASSESSMENT:

1. Precordial chest pain.

2. History of pulmonary embolism.

3. Coronary artery disease, status post multivessel angioplasty.



PLAN:

I will obtain a dobutamine echo on him.  If this is negative, he may be discharged home

and keep his outpatient follow up with me. If this is abnormal, I will perform cardiac

catheterization on him.





MMODL / IJN: [id number] / Job#: [id number]

## 2018-07-23 NOTE — ECHOF
Referral Reason:chest pain



MEASUREMENTS

--------

HEIGHT: 172.7 cm

WEIGHT: 95.3 kg

BP: 118/69

RVIDd:   3.7 cm     (< 3.3)

IVSd:   1.1 cm     (0.6 - 1.1)

LVIDd:   4.9 cm     (3.9 - 5.3)

LVPWd:   1.0 cm     (0.6 - 1.1)

IVSs:   1.6 cm

LVIDs:   3.1 cm

LVPWs:   1.6 cm

LA Diam:   3.4 cm     (2.7 - 3.8)

LAESV Index (A-L):   25.00 ml/m

Ao Diam:   3.1 cm     (2.0 - 3.7)

AV Cusp:   2.1 cm     (1.5 - 2.6)

MV EXCURSION:   13.536 mm     (> 18.000)

MV EF SLOPE:   62 mm/s     (70 - 150)

EPSS:   0.8 cm

MV E Lexx:   0.58 m/s

MV DecT:   291 ms

MV A Lexx:   0.64 m/s

MV E/A Ratio:   0.91 

AR PHT:   1118 ms

RAP:   5.00 mmHg

RVSP:   27.00 mmHg







FINDINGS

--------

Sinus rhythm.

This was a technically adequate study.

The left ventricular size is normal.   Left ventricular wall thickness is normal.   Overall left vent
ricular systolic function is normal with, an EF between 60 - 65 %.

The right ventricle is mild to moderately enlarged.

Normal LA  size by volume 22+/-6 ml/m2.

The right atrium is normal in size.

There is mild aortic valve sclerosis.

Mild mitral annular calcification present.

Mild tricuspid regurgitation present.   Right ventricular systolic pressure is normal at < 35 mmHg.

There is no pulmonic regurgitation present.

The aortic root size is normal.

Normal inferior vena cava with normal inspiratory collapse consistent with estimated right atrial pre
ssure of  5 mmHg.

There is no pericardial effusion.



CONCLUSIONS

--------

1. Sinus rhythm.

2. This was a technically adequate study.

3. The left ventricular size is normal.

4. Left ventricular wall thickness is normal.

5. Overall left ventricular systolic function is normal with, an EF between 60 - 65 %.

6. The right ventricle is mild to moderately enlarged.

7. Normal LA size by volume 22+/-6 ml/m2.

8. The right atrium is normal in size.

9. There is mild aortic valve sclerosis.

10. Mild mitral annular calcification present.

11. Mild tricuspid regurgitation present.

12. Right ventricular systolic pressure is normal at < 35 mmHg.

13. There is no pulmonic regurgitation present.

14. The aortic root size is normal.

15. Normal inferior vena cava with normal inspiratory collapse consistent with estimated right atrial
 pressure of  5 mmHg.

16. There is no pericardial effusion.





SONOGRAPHER: Italia Ibanez RDCS

## 2018-07-23 NOTE — ECHOS
STRESS ECHOCARDIOGRAM



DATE OF SERVICE:

07/23/2018



INDICATIONS:

Chest pain.



MEDICATIONS:



BASELINE HEART RATE:

54



BASELINE BLOOD PRESSURE:

127/62



MAXIMUM HEART RATE:

132



MAXIMUM BLOOD PRESSURE:

203/44



85% MPHR:

122



100% MPHR:

143



METS:



MAXIMUM STAGE REACHED:



TOTAL EXERCISE TIME:



CLINICAL INFORMATION:

Baseline EKG shows sinus rhythm, normal axis, normal intervals.  Patient was given

intravenous dobutamine over a period of 7-1/2 minutes as per protocol.  Did not have

chest pain or diagnostic ST-segment depression.



Baseline echo shows normal left ventricular size, wall motion and systolic function.

Post dobutamine infusion, there is normal hyperdynamic response of all segments of

myocardium noted.



CONCLUSIONS:

1. Negative stress test by EKG criteria.

2. Negative dobutamine echo.





MMODL / IJN: [id number] / Job#: [id number]

## 2018-08-10 ENCOUNTER — HOSPITAL ENCOUNTER (OUTPATIENT)
Dept: HOSPITAL 47 - RADUSWWP | Age: 78
Discharge: HOME | End: 2018-08-10
Attending: FAMILY MEDICINE
Payer: COMMERCIAL

## 2018-08-10 DIAGNOSIS — I65.23: Primary | ICD-10-CM

## 2018-08-10 DIAGNOSIS — I73.9: ICD-10-CM

## 2018-08-10 PROCEDURE — 93923 UPR/LXTR ART STDY 3+ LVLS: CPT

## 2018-08-10 PROCEDURE — 93880 EXTRACRANIAL BILAT STUDY: CPT

## 2018-08-10 NOTE — US
EXAMINATION TYPE: US carotid duplex BILAT

 

DATE OF EXAM: 8/10/2018

 

COMPARISON: NONE

 

CLINICAL HISTORY: Peripheral ArteryD I73.9, Occlusion/Stenosis I65.23. TIA with left side affected

 

EXAM MEASUREMENTS: 

 

RIGHT:  Peak Systolic Velocity (PSV) cm/sec

----- Right CCA:  78.2  

----- Right ICA:  81.0     

----- Right ECA:  266.0   

ICA/CCA ratio:  1.0    

 

RIGHT:  End Diastole cm/sec

----- Right CCA:  16.7   

----- Right ICA:  25.2      

----- Right ECA:  21.5     

 

LEFT:  Peak Systolic Velocity (PSV) cm/sec

----- Left CCA:  82.4  

----- Left ICA:  105.7

----- Left ECA:  96.6  

ICA/CCA ratio:  1.3  

 

LEFT:  End Diastole cm/sec

----- Left CCA:  20.2  

----- Left ICA:  34.5   

----- Left ECA:  11.1 

 

VERTEBRALS (direction of flow):

Right Vertebral: not detected, ?absent

Left Vertebral: antegrade

 

Rhythm:  Normal

 

Mild to moderate plaque bilaterally, no elevated velocities in wither ICA, no significant stenosis.

 

 

 

IMPRESSION: Moderate degree of grayscale atheromatous plaquing with no sonographically evident hemody
namically significant stenosis within either visualized carotid arterial system. However degree of le
ft carotid bulb grayscale plaquing appears more pronounced than corresponding velocities. Also given 
this patient's left-sided TIA more accurate degree of stenosis could be performed with CTA neck.

## 2018-08-13 ENCOUNTER — HOSPITAL ENCOUNTER (OUTPATIENT)
Dept: HOSPITAL 47 - RADMRIMAIN | Age: 78
End: 2018-08-13
Attending: INTERNAL MEDICINE
Payer: COMMERCIAL

## 2018-08-13 DIAGNOSIS — I67.82: ICD-10-CM

## 2018-08-13 DIAGNOSIS — G31.9: Primary | ICD-10-CM

## 2018-08-13 PROCEDURE — 70551 MRI BRAIN STEM W/O DYE: CPT

## 2018-08-13 NOTE — MR
EXAMINATION TYPE: MR brain wo con

 

DATE OF EXAM: 8/13/2018

 

COMPARISON: NONE

 

HISTORY: Transient cerebral ischemic attack per order (G45.9). Headaches with bilateral hearing loss 
and left-sided weakness per patient.

 

TECHNIQUE: Multiplanar, multisequence imaging of the brain and brainstem is performed without IV cont
rast.

 

FINDINGS:  

Diffusion weighted images demonstrate no evidence of a recent infarct or other diffusion abnormality.


 

There is no worrisome extra-axial fluid collection. There is ventricular and sulcal prominence consis
tent with diffuse cerebral atrophy. There are multifocal areas of T2 hyperintensity seen throughout t
he superficial, deep, and periventricular white matter. Estimate 50-70 scattered lesions. Lesions are
 nonspecific in appearance and distribution but are most likely on basis of product of chronic small 
vessel ischemic change in patient of this age.

 

Midline structures demonstrate normal morphology.  The craniocervical junction appears within normal 
limits. Normal vascular flow voids are present. Mild mucosal thickening involving ethmoid sinuses courtney
aterally is present. Remainder paranasal sinuses are clear. Globes are intact bilaterally.

 

IMPRESSION: 

1. No evidence of a recent infarct.

2. Background mild-to-moderate diffuse cerebral atrophy and moderate to severe chronic small vessel i
schemic change.

## 2018-09-04 ENCOUNTER — HOSPITAL ENCOUNTER (OUTPATIENT)
Dept: HOSPITAL 47 - RADCTMAIN | Age: 78
Discharge: HOME | End: 2018-09-04
Attending: FAMILY MEDICINE
Payer: COMMERCIAL

## 2018-09-04 DIAGNOSIS — I65.23: Primary | ICD-10-CM

## 2018-09-04 LAB — BUN SERPL-SCNC: 19 MG/DL (ref 9–20)

## 2018-09-04 PROCEDURE — 70498 CT ANGIOGRAPHY NECK: CPT

## 2018-09-04 PROCEDURE — 36415 COLL VENOUS BLD VENIPUNCTURE: CPT

## 2018-09-04 PROCEDURE — 84520 ASSAY OF UREA NITROGEN: CPT

## 2018-09-04 PROCEDURE — 82565 ASSAY OF CREATININE: CPT

## 2018-09-05 NOTE — CT
EXAMINATION TYPE: CT angio neck

 

DATE OF EXAM: 9/4/2018

 

HISTORY: Carotid stenosis

 

COMPARISON: None

 

CT DLP: 323.6 mGycm.  Automated Exposure Control for Dose Reduction was Utilized.

 

TECHNIQUE:  CTA scan of the neck is performed with IV Contrast, patient injected with 65 mL of Isovue
 370, axial images are obtained, coronal and sagittal reformatted images are reviewed. Three-D recons
tructed images are created on an independent workstation and reviewed.

 

FINDINGS:

 

Carotid/Vascular Structures: There is a three-vessel arch. Carotid bifurcations appear patent. Athero
matous plaquing is present without significant flow-limiting stenosis. Vertebral arteries are codomin
ant and patent. 

 

Three-D reconstructed images performed separately on the [employer] computer by the technologist are revi
ewed.

 

IMPRESSION:

1. Atheromatous plaquing at the bilateral carotid bifurcations. This is greater on the left.  Both si
radha appear less than 50% narrowed.

## 2018-12-10 ENCOUNTER — HOSPITAL ENCOUNTER (OUTPATIENT)
Dept: HOSPITAL 47 - LABWHC1 | Age: 78
Discharge: HOME | End: 2018-12-10
Payer: COMMERCIAL

## 2018-12-10 DIAGNOSIS — F03.90: ICD-10-CM

## 2018-12-10 DIAGNOSIS — E11.65: ICD-10-CM

## 2018-12-10 DIAGNOSIS — J44.1: ICD-10-CM

## 2018-12-10 DIAGNOSIS — E78.2: Primary | ICD-10-CM

## 2018-12-10 DIAGNOSIS — I10: ICD-10-CM

## 2018-12-10 LAB
ALBUMIN SERPL-MCNC: 4.4 G/DL (ref 3.8–4.9)
ALBUMIN/GLOB SERPL: 2.93 G/DL (ref 1.2–2.1)
ALP SERPL-CCNC: 57 U/L (ref 41–126)
ALT SERPL-CCNC: 54 U/L (ref 10–49)
ANION GAP SERPL CALC-SCNC: 7.5 MMOL/L (ref 4–12)
AST SERPL-CCNC: 34 U/L (ref 14–35)
BASOPHILS # BLD AUTO: 0 K/UL (ref 0–0.2)
BASOPHILS NFR BLD AUTO: 0 %
BUN SERPL-SCNC: 19 MG/DL (ref 9–27)
CALCIUM SPEC-MCNC: 9.4 MG/DL (ref 8.7–10.3)
CHLORIDE SERPL-SCNC: 107 MMOL/L (ref 96–109)
CHOLEST SERPL-MCNC: 163 MG/DL (ref 0–200)
CK SERPL-CCNC: 186 U/L (ref 35–257)
CO2 SERPL-SCNC: 25.5 MMOL/L (ref 21.6–31.8)
EOSINOPHIL # BLD AUTO: 0.1 K/UL (ref 0–0.7)
EOSINOPHIL NFR BLD AUTO: 1 %
ERYTHROCYTE [DISTWIDTH] IN BLOOD BY AUTOMATED COUNT: 5.07 M/UL (ref 4.3–5.9)
ERYTHROCYTE [DISTWIDTH] IN BLOOD: 13.6 % (ref 11.5–15.5)
GLOBULIN SER CALC-MCNC: 1.5 G/DL (ref 2.1–3.7)
GLUCOSE SERPL-MCNC: 116 MG/DL (ref 70–110)
HCT VFR BLD AUTO: 46.6 % (ref 39–53)
HDLC SERPL-MCNC: 52 MG/DL (ref 40–60)
HGB BLD-MCNC: 14.9 GM/DL (ref 13–17.5)
LDLC SERPL CALC-MCNC: 74 MG/DL (ref 0–131)
LYMPHOCYTES # SPEC AUTO: 1.2 K/UL (ref 1–4.8)
LYMPHOCYTES NFR SPEC AUTO: 12 %
MAGNESIUM SPEC-SCNC: 2 MG/DL (ref 1.5–2.4)
MCH RBC QN AUTO: 29.4 PG (ref 25–35)
MCHC RBC AUTO-ENTMCNC: 31.9 G/DL (ref 31–37)
MCV RBC AUTO: 92 FL (ref 80–100)
MONOCYTES # BLD AUTO: 0.4 K/UL (ref 0–1)
MONOCYTES NFR BLD AUTO: 4 %
NEUTROPHILS # BLD AUTO: 7.7 K/UL (ref 1.3–7.7)
NEUTROPHILS NFR BLD AUTO: 82 %
PLATELET # BLD AUTO: 210 K/UL (ref 150–450)
POTASSIUM SERPL-SCNC: 5.1 MMOL/L (ref 3.5–5.5)
PROT SERPL-MCNC: 5.9 G/DL (ref 6.2–8.2)
SODIUM SERPL-SCNC: 140 MMOL/L (ref 135–145)
T4 FREE SERPL-MCNC: 1.5 NG/DL (ref 0.8–1.8)
TRIGL SERPL-MCNC: 185 MG/DL (ref 0–149)
VLDLC SERPL CALC-MCNC: 37 MG/DL (ref 5–40)
WBC # BLD AUTO: 9.4 K/UL (ref 3.8–10.6)

## 2018-12-10 PROCEDURE — 83735 ASSAY OF MAGNESIUM: CPT

## 2018-12-10 PROCEDURE — 82607 VITAMIN B-12: CPT

## 2018-12-10 PROCEDURE — 80061 LIPID PANEL: CPT

## 2018-12-10 PROCEDURE — 80053 COMPREHEN METABOLIC PANEL: CPT

## 2018-12-10 PROCEDURE — 85025 COMPLETE CBC W/AUTO DIFF WBC: CPT

## 2018-12-10 PROCEDURE — 84443 ASSAY THYROID STIM HORMONE: CPT

## 2018-12-10 PROCEDURE — 82550 ASSAY OF CK (CPK): CPT

## 2018-12-10 PROCEDURE — 84439 ASSAY OF FREE THYROXINE: CPT

## 2018-12-10 PROCEDURE — 36415 COLL VENOUS BLD VENIPUNCTURE: CPT

## 2018-12-14 ENCOUNTER — HOSPITAL ENCOUNTER (OUTPATIENT)
Dept: HOSPITAL 47 - RADXRMAIN | Age: 78
End: 2018-12-14
Attending: FAMILY MEDICINE
Payer: COMMERCIAL

## 2018-12-14 DIAGNOSIS — R91.8: Primary | ICD-10-CM

## 2018-12-14 PROCEDURE — 71046 X-RAY EXAM CHEST 2 VIEWS: CPT

## 2018-12-14 NOTE — XR
EXAMINATION TYPE: XR chest 2V

 

DATE OF EXAM: 12/14/2018

 

COMPARISON: 7/22/2018

 

INDICATION: COPD persistent cough

 

TECHNIQUE:  Frontal and lateral views of the chest are obtained.

 

FINDINGS:  

The heart size is normal.  

The pulmonary vasculature is normal.

There appears to be a subtle posterior right lower lobe infiltrate. Correlate for pneumonia.

 

IMPRESSION:  

1. Subtle posterior medial right lower lobe infiltrate. Correlate for pneumonia. Follow-up can be per
formed as clinically indicated.

## 2019-01-17 ENCOUNTER — HOSPITAL ENCOUNTER (OUTPATIENT)
Dept: HOSPITAL 47 - RADFLMAIN | Age: 79
End: 2019-01-17
Attending: FAMILY MEDICINE
Payer: COMMERCIAL

## 2019-01-17 DIAGNOSIS — J18.9: Primary | ICD-10-CM

## 2019-01-17 PROCEDURE — 74230 X-RAY XM SWLNG FUNCJ C+: CPT

## 2019-01-17 NOTE — FL
MODIFIED SWALLOW / DEGLUTITION STUDY

 

DATE OF EXAM: 1/17/2019

 

CLINICAL HISTORY: 79-year-old male pneumonia, COPD and occasional coughing with eating.

 

TECHNIQUE:  Deglutition study is performed utilizing thin liquid barium, honey and nectar thick liqui
d barium, barium thick applesauce, and barium coated cracker.

 

COMPARISON: None.

 

Total fluoroscopy time: 1 minute 30 seconds.

Total images: None. Real-time fluoroscopy support was provided to speech pathology.

 

FINDINGS: 

There is some anterior cervical spondylosis from C3 down causing some mild posterior impression on to
 the hypopharynx. No obstruction.

The oral and pharyngeal phases show satisfactory initiation and propagation with all modalities teste
d.  Normal mastication is seen with solid modalities tested.  There is no evidence of penetration or 
aspiration with any modality tested.  No significant pharyngeal residue was appreciated.

 

 

IMPRESSION:  

Normal deglutition study.  Mild anterior cervical spondylosis.

Please refer to speech therapist notes for further details if necessary.

## 2019-04-17 ENCOUNTER — HOSPITAL ENCOUNTER (OUTPATIENT)
Dept: HOSPITAL 47 - LABWHC1 | Age: 79
Discharge: HOME | End: 2019-04-17
Payer: COMMERCIAL

## 2019-04-17 DIAGNOSIS — Z53.9: Primary | ICD-10-CM

## 2019-06-08 ENCOUNTER — HOSPITAL ENCOUNTER (INPATIENT)
Dept: HOSPITAL 47 - EC | Age: 79
LOS: 5 days | Discharge: HOME | DRG: 190 | End: 2019-06-13
Attending: FAMILY MEDICINE | Admitting: FAMILY MEDICINE
Payer: COMMERCIAL

## 2019-06-08 DIAGNOSIS — Z95.5: ICD-10-CM

## 2019-06-08 DIAGNOSIS — Z96.652: ICD-10-CM

## 2019-06-08 DIAGNOSIS — Z87.2: ICD-10-CM

## 2019-06-08 DIAGNOSIS — J44.1: Primary | ICD-10-CM

## 2019-06-08 DIAGNOSIS — G43.909: ICD-10-CM

## 2019-06-08 DIAGNOSIS — K21.9: ICD-10-CM

## 2019-06-08 DIAGNOSIS — E11.9: ICD-10-CM

## 2019-06-08 DIAGNOSIS — Z79.84: ICD-10-CM

## 2019-06-08 DIAGNOSIS — Z79.1: ICD-10-CM

## 2019-06-08 DIAGNOSIS — E78.5: ICD-10-CM

## 2019-06-08 DIAGNOSIS — Z99.89: ICD-10-CM

## 2019-06-08 DIAGNOSIS — Z79.82: ICD-10-CM

## 2019-06-08 DIAGNOSIS — Z98.42: ICD-10-CM

## 2019-06-08 DIAGNOSIS — J18.1: ICD-10-CM

## 2019-06-08 DIAGNOSIS — Z82.49: ICD-10-CM

## 2019-06-08 DIAGNOSIS — Z86.711: ICD-10-CM

## 2019-06-08 DIAGNOSIS — Z86.718: ICD-10-CM

## 2019-06-08 DIAGNOSIS — Z79.899: ICD-10-CM

## 2019-06-08 DIAGNOSIS — F03.90: ICD-10-CM

## 2019-06-08 DIAGNOSIS — I25.2: ICD-10-CM

## 2019-06-08 DIAGNOSIS — Z87.891: ICD-10-CM

## 2019-06-08 DIAGNOSIS — Z86.59: ICD-10-CM

## 2019-06-08 DIAGNOSIS — J98.11: ICD-10-CM

## 2019-06-08 DIAGNOSIS — J44.0: ICD-10-CM

## 2019-06-08 DIAGNOSIS — I10: ICD-10-CM

## 2019-06-08 DIAGNOSIS — M19.90: ICD-10-CM

## 2019-06-08 DIAGNOSIS — Z81.8: ICD-10-CM

## 2019-06-08 DIAGNOSIS — H91.90: ICD-10-CM

## 2019-06-08 DIAGNOSIS — I25.10: ICD-10-CM

## 2019-06-08 DIAGNOSIS — Z98.41: ICD-10-CM

## 2019-06-08 DIAGNOSIS — J45.901: ICD-10-CM

## 2019-06-08 DIAGNOSIS — Z87.442: ICD-10-CM

## 2019-06-08 DIAGNOSIS — G47.33: ICD-10-CM

## 2019-06-08 LAB
ALBUMIN SERPL-MCNC: 4.1 G/DL (ref 3.5–5)
ALP SERPL-CCNC: 65 U/L (ref 38–126)
ALT SERPL-CCNC: 37 U/L (ref 21–72)
ANION GAP SERPL CALC-SCNC: 10 MMOL/L
APTT BLD: 23.5 SEC (ref 22–30)
AST SERPL-CCNC: 32 U/L (ref 17–59)
BASOPHILS # BLD AUTO: 0.1 K/UL (ref 0–0.2)
BASOPHILS NFR BLD AUTO: 1 %
BUN SERPL-SCNC: 19 MG/DL (ref 9–20)
CALCIUM SPEC-MCNC: 9.6 MG/DL (ref 8.4–10.2)
CHLORIDE SERPL-SCNC: 109 MMOL/L (ref 98–107)
CO2 SERPL-SCNC: 21 MMOL/L (ref 22–30)
D DIMER PPP FEU-MCNC: 1.03 MG/L FEU (ref ?–0.6)
EOSINOPHIL # BLD AUTO: 0.2 K/UL (ref 0–0.7)
EOSINOPHIL NFR BLD AUTO: 2 %
ERYTHROCYTE [DISTWIDTH] IN BLOOD BY AUTOMATED COUNT: 4.78 M/UL (ref 4.3–5.9)
ERYTHROCYTE [DISTWIDTH] IN BLOOD: 13.7 % (ref 11.5–15.5)
GLUCOSE SERPL-MCNC: 133 MG/DL (ref 74–99)
HCT VFR BLD AUTO: 41.8 % (ref 39–53)
HGB BLD-MCNC: 14 GM/DL (ref 13–17.5)
INR PPP: 1 (ref ?–1.2)
LYMPHOCYTES # SPEC AUTO: 1.7 K/UL (ref 1–4.8)
LYMPHOCYTES NFR SPEC AUTO: 15 %
MAGNESIUM SPEC-SCNC: 2 MG/DL (ref 1.6–2.3)
MCH RBC QN AUTO: 29.3 PG (ref 25–35)
MCHC RBC AUTO-ENTMCNC: 33.5 G/DL (ref 31–37)
MCV RBC AUTO: 87.5 FL (ref 80–100)
MONOCYTES # BLD AUTO: 0.8 K/UL (ref 0–1)
MONOCYTES NFR BLD AUTO: 7 %
NEUTROPHILS # BLD AUTO: 8.6 K/UL (ref 1.3–7.7)
NEUTROPHILS NFR BLD AUTO: 75 %
PLATELET # BLD AUTO: 196 K/UL (ref 150–450)
POTASSIUM SERPL-SCNC: 4.1 MMOL/L (ref 3.5–5.1)
PROT SERPL-MCNC: 6.4 G/DL (ref 6.3–8.2)
PT BLD: 10.3 SEC (ref 9–12)
SODIUM SERPL-SCNC: 140 MMOL/L (ref 137–145)
WBC # BLD AUTO: 11.5 K/UL (ref 3.8–10.6)

## 2019-06-08 PROCEDURE — 96365 THER/PROPH/DIAG IV INF INIT: CPT

## 2019-06-08 PROCEDURE — 87070 CULTURE OTHR SPECIMN AEROBIC: CPT

## 2019-06-08 PROCEDURE — 85730 THROMBOPLASTIN TIME PARTIAL: CPT

## 2019-06-08 PROCEDURE — 94760 N-INVAS EAR/PLS OXIMETRY 1: CPT

## 2019-06-08 PROCEDURE — 84484 ASSAY OF TROPONIN QUANT: CPT

## 2019-06-08 PROCEDURE — 85379 FIBRIN DEGRADATION QUANT: CPT

## 2019-06-08 PROCEDURE — 99285 EMERGENCY DEPT VISIT HI MDM: CPT

## 2019-06-08 PROCEDURE — 87205 SMEAR GRAM STAIN: CPT

## 2019-06-08 PROCEDURE — 80053 COMPREHEN METABOLIC PANEL: CPT

## 2019-06-08 PROCEDURE — 36415 COLL VENOUS BLD VENIPUNCTURE: CPT

## 2019-06-08 PROCEDURE — 85025 COMPLETE CBC W/AUTO DIFF WBC: CPT

## 2019-06-08 PROCEDURE — 94640 AIRWAY INHALATION TREATMENT: CPT

## 2019-06-08 PROCEDURE — 71275 CT ANGIOGRAPHY CHEST: CPT

## 2019-06-08 PROCEDURE — 83735 ASSAY OF MAGNESIUM: CPT

## 2019-06-08 PROCEDURE — 83880 ASSAY OF NATRIURETIC PEPTIDE: CPT

## 2019-06-08 PROCEDURE — 85027 COMPLETE CBC AUTOMATED: CPT

## 2019-06-08 PROCEDURE — 71046 X-RAY EXAM CHEST 2 VIEWS: CPT

## 2019-06-08 PROCEDURE — 96375 TX/PRO/DX INJ NEW DRUG ADDON: CPT

## 2019-06-08 PROCEDURE — 83036 HEMOGLOBIN GLYCOSYLATED A1C: CPT

## 2019-06-08 PROCEDURE — 85610 PROTHROMBIN TIME: CPT

## 2019-06-08 RX ADMIN — CEFAZOLIN SCH MLS/HR: 330 INJECTION, POWDER, FOR SOLUTION INTRAMUSCULAR; INTRAVENOUS at 23:49

## 2019-06-08 NOTE — XR
EXAMINATION TYPE: XR chest 2V

 

DATE OF EXAM: 6/8/2019

 

COMPARISON: 12/14/2018

 

HISTORY: Chest pain

 

TECHNIQUE:  Frontal and lateral views of the chest are obtained.

 

FINDINGS:  Heart is normal. Lungs are clear of consolidation. Thoracic aorta is atheromatous. There a
re chest leads. Bony thorax is intact.

 

IMPRESSION:  No active cardiopulmonary disease. Normal heart. No change.

## 2019-06-08 NOTE — ED
General Adult HPI





- General


Source: patient, RN notes reviewed, old records reviewed


Mode of arrival: ambulatory


Limitations: no limitations





<Jarret Lovell - Last Filed: 19 22:50>





<Peng August - Last Filed: 19 23:05>





- General


Chief complaint: Shortness of Breath


Stated complaint: JOEY


Time Seen by Provider: 19 19:54





- History of Present Illness


Initial comments: 


79-year-old male patient past type 2 diabetes, coronary artery disease status 

post 7 stents, prior PE presents to ED for proximal any six-month cough, 

progressive shortness of breath.  Patient was that he has follow-up with his [facility] care provider.  Patient states he had approximately 2 minutes of 

substernal chest pain prior to presentation to ED.  Patient primary complaint is

cough, shortness of breath. Denies any other complaints. 











Systemic: Pt denies fatigue, fever/chills, rash. Pt denies weakness, night 

sweats, weight loss. 


Neuro: Pt denies headache, visual disturbances, syncope or pre-syncope.


HEENT: Pt denies ocular discharge or irritation, otalgia, rhinorrhea, 

pharyngitis or notable lymphadenopathy. 


Cardiopulmonary: Pt denies heart palpitations, dyspnea on exertion.  


Abdominal/GI: Pt denies abdominal pain, n/v/d. 


: Pt denies dysuria, burning w/ urination, frequency/urgency. Denies new onset

urinary or bowel incontinence.  


MSK: Pt denies myalgia, loss of strength or function in extremities. 


Neuro: Pt denies new onset weakness, paresthesias. 


 (Jarret Lovell)





- Related Data


                                Home Medications











 Medication  Instructions  Recorded  Confirmed


 


Atenolol [Tenormin] 25 mg PO QAM 14


 


Famotidine 40 mg PO BID 14


 


Lisinopril [Zestril] 2.5 mg PO HS 14


 


metFORMIN HCL [Glucophage] 500 mg PO BID 14


 


Aspirin 81 mg PO QAM 16


 


Albuterol Inhaler [Ventolin Hfa 1 puff INHALATION RT-Q6H PRN 16





Inhaler]   


 


Cyanocobalamin [Vitamin B-12] 250 mcg PO DAILY 16


 


Lovastatin [Mevacor] 40 mg PO HS 16


 


Cholecalciferol [Vitamin D3 (25 1,000 unit PO DAILY 17





Mcg = 1000 Iu)]   


 


Meloxicam 15 mg PO DAILY 17


 


Montelukast [Singulair] 10 mg PO HS 17


 


Multivitamins, Thera [Multivitamin 1 tab PO DAILY 17





(formulary)]   


 


Donepezil [Aricept] 10 mg PO HS 18


 


Memantine [Namenda] 10 mg PO BID 18


 


Butalbital/Acetaminophen 1 - 2 cap PO Q4H PRN 19





[Butalbital/Acetaminophen 50-300mg]   


 


guaiFENesin [Mucinex] 600 mg PO BID 19


 


predniSONE See Taper PO AS DIRECTED 19


 


traZODone HCL 50 mg PO HS 19











                                    Allergies











Allergy/AdvReac Type Severity Reaction Status Date / Time


 


No Known Allergies Allergy   Verified 19 22:44














Review of Systems


ROS Other: All systems not noted in ROS Statement are negative.





<Jarret Lovell - Last Filed: 19 22:50>


ROS Other: All systems not noted in ROS Statement are negative.





<Peng August - Last Filed: 19 23:05>


ROS Statement: 


Those systems with pertinent positive or pertinent negative responses have been 

documented in the HPI.








Past Medical History


Past Medical History: Asthma, Coronary Artery Disease (CAD), COPD, Diabetes 

Mellitus, Deep Vein Thrombosis (DVT), GERD/Reflux, Hyperlipidemia, Hypertension,

 Memory Impairment, Myocardial Infarction (MI), Osteoarthritis (OA), Pneumonia, 

Pulmonary Embolus (PE), Skin Disorder, Sleep Apnea/CPAP/BIPAP


Additional Past Medical History / Comment(s): Migraines.  KATHY PE and LT DVT 

2016.  Bruises easily.  Hx Kidney Stones.  Dementia.  USES CPAP.


Last Myocardial Infarction Date::  AND 


History of Any Multi-Drug Resistant Organisms: None Reported


Past Surgical History: Heart Catheterization With Stent


Additional Past Surgical History / Comment(s): 7 STENTS.  LT KNEE REPLACEMENT.  

LT ACHILLES TENDON REPAIR.   bilat CATARACT 16.


Past Anesthesia/Blood Transfusion Reactions: Previous Problems w/ Anesthesia


Additional Past Anesthesia/Blood Transfusion Reaction / Comment(s): DIFFICULTY 

WAKING UP.


Date of Last Stent Placement:: 


Past Psychological History: Anxiety, Depression, Panic Disorder


Smoking Status: Former smoker


Past Alcohol Use History: None Reported


Past Drug Use History: None Reported





- Past Family History


  ** Father


Family Medical History: Dementia (Father  at age of 80 at extended care 

facility from dementia.)





  ** Mother


Family Medical History: Coronary Artery Disease (CAD)


Additional Family Medical History / Comment(s): QUAD BYPASS





  ** Brother(s)


Family Medical History: Coronary Artery Disease (CAD)





  ** Sister(s)


Family Medical History: No Reported History





  ** Daughter(s)


Family Medical History: No Reported History





  ** Son(s)


Family Medical History: No Reported History





<Jarret Lovell - Last Filed: 19 22:50>





General Exam


Limitations: no limitations





<Jarret Lovell - Last Filed: 19 22:50>





- General Exam Comments


Initial Comments: 





Constitutional: NAD, AOX3, Pt has pleasant affect. 


HEENT: NC/AT, trachea midline, neck supple, no lymphadenopathy. Posterior 

pharynx non erythematous, without exudates. External ears appear normal, without

 discharge. Mucous membranes moist. Eyes PERRLA, EOM intact. There is no scleral

 icterus. No pallor noted. 


Cardiopulmonary: RRR, no murmurs, rubs or gallops, no JVD noted.  Wheezing noted

 in anterior and posterior fields, mildly improved after breathing treatment.. 

No peripheral edema. 


Abdominal exam: Abdomen soft and non-distended. Abdomen non-tender to palpation 

in all 4 quadrants. Bowel sounds active in LLQ. No hepatosplenomegaly. No 

ecchymosis


Neuro: CN II-XII grossly intact. No nuchal rigidity. No raccon eyes, no hassan 

sign, no hemotympanum. No cervical spinal tenderness. 


MSK: No posterior calf tenderness bilaterally, homans sign negative bilaterally.

 Posterior tibialis and radial pulse +2 bilaterally. Sensation intact in upper 

and lower extremities. Full active ROM in upper and lower extremities, 5/5 

stregnth. 








 (Jarret Lovell)





Course





<Peng August - Last Filed: 19 23:05>


                                   Vital Signs











  19





  19:45 20:22 20:37


 


Temperature 99.8 F H  


 


Pulse Rate 107 H 96 101 H


 


Respiratory 20 18 20





Rate   


 


Blood Pressure 153/75  


 


O2 Sat by Pulse 94 L  





Oximetry   














  19





  22:00


 


Temperature 


 


Pulse Rate 109 H


 


Respiratory 20





Rate 


 


Blood Pressure 138/72


 


O2 Sat by Pulse 93 L





Oximetry 














- Reevaluation(s)


Reevaluation #1: 





19 23:03


I did personally evaluate the patient for PA supervision he presents with 

complaints of 2 days of shortness of breath he does demonstrate evidence of a 

left lower lobe infiltrate with COPD exacerbation he was also dyspneic.  He did 

evaluate and treat emergency department he will require admission.  The case is 

discussed with Dr. Walsh. (Peng August)





Medical Decision Making





- Lab Data


Result diagrams: 


                                 19 20:33





                                 19 20:33





<Jarret Lovell - Last Filed: 19 22:50>





- Lab Data


Result diagrams: 


                                 19 20:33





                                 19 20:33





<Peng August - Last Filed: 19 23:05>





- Medical Decision Making


79-year-old male patient past type 2 diabetes, coronary artery disease status 

post 7 stents, prior PE presents to ED for proximal any six-month cough, progre

ssive shortness of breath.  Patient was that he has follow-up with his primary 

care provider.  Patient states he had approximately 2 minutes of substernal 

chest pain prior to presentation to ED.  Patient primary complaint is cough, 

shortness of breath. Denies any other complaints.  Patient vital signs displayed

 low-grade fever 99.8.  Mild tachycardia, O2 saturation 94% on room air.Physical

 exam displayed: Wheezing noted in anterior and posterior fields, mildly 

improved after breathing treatment.  Laboratory investigations revealed mild 

leukocytosis of 11.5.  Cartilage studies non-impressive.  D-dimer mildly 

elevated at 1.03.  CMP noncompressive.  Troponin negative.  BNP 93.  Chest x-ray

 revealed no acute process.  CTA revealed no evidence appointment pleasant.  

Infiltrates in left lower lobe consistent with inflammatory disease.  Patient 

admitted for COPD exacerbation. Pulmonology consulted. Case discussed with Dr. August. 


 (Jarret Lovell)





- Lab Data


                                   Lab Results











  19 Range/Units





  20:33 20:33 20:33 


 


WBC  11.5 H    (3.8-10.6)  k/uL


 


RBC  4.78    (4.30-5.90)  m/uL


 


Hgb  14.0    (13.0-17.5)  gm/dL


 


Hct  41.8    (39.0-53.0)  %


 


MCV  87.5    (80.0-100.0)  fL


 


MCH  29.3    (25.0-35.0)  pg


 


MCHC  33.5    (31.0-37.0)  g/dL


 


RDW  13.7    (11.5-15.5)  %


 


Plt Count  196    (150-450)  k/uL


 


Neutrophils %  75    %


 


Lymphocytes %  15    %


 


Monocytes %  7    %


 


Eosinophils %  2    %


 


Basophils %  1    %


 


Neutrophils #  8.6 H    (1.3-7.7)  k/uL


 


Lymphocytes #  1.7    (1.0-4.8)  k/uL


 


Monocytes #  0.8    (0-1.0)  k/uL


 


Eosinophils #  0.2    (0-0.7)  k/uL


 


Basophils #  0.1    (0-0.2)  k/uL


 


PT    10.3  (9.0-12.0)  sec


 


INR    1.0  (<1.2)  


 


APTT    23.5  (22.0-30.0)  sec


 


D-Dimer    1.03 H  (<0.60)  mg/L FEU


 


Sodium   140   (137-145)  mmol/L


 


Potassium   4.1   (3.5-5.1)  mmol/L


 


Chloride   109 H   ()  mmol/L


 


Carbon Dioxide   21 L   (22-30)  mmol/L


 


Anion Gap   10   mmol/L


 


BUN   19   (9-20)  mg/dL


 


Creatinine   0.85   (0.66-1.25)  mg/dL


 


Est GFR (CKD-EPI)AfAm   >90   (>60 ml/min/1.73 sqM)  


 


Est GFR (CKD-EPI)NonAf   84   (>60 ml/min/1.73 sqM)  


 


Glucose   133 H   (74-99)  mg/dL


 


Calcium   9.6   (8.4-10.2)  mg/dL


 


Magnesium   2.0   (1.6-2.3)  mg/dL


 


Total Bilirubin   0.7   (0.2-1.3)  mg/dL


 


AST   32   (17-59)  U/L


 


ALT   37   (21-72)  U/L


 


Alkaline Phosphatase   65   ()  U/L


 


Troponin I     (0.000-0.034)  ng/mL


 


NT-Pro-B Natriuret Pep     pg/mL


 


Total Protein   6.4   (6.3-8.2)  g/dL


 


Albumin   4.1   (3.5-5.0)  g/dL














  19 Range/Units





  20:33 20:33 


 


WBC    (3.8-10.6)  k/uL


 


RBC    (4.30-5.90)  m/uL


 


Hgb    (13.0-17.5)  gm/dL


 


Hct    (39.0-53.0)  %


 


MCV    (80.0-100.0)  fL


 


MCH    (25.0-35.0)  pg


 


MCHC    (31.0-37.0)  g/dL


 


RDW    (11.5-15.5)  %


 


Plt Count    (150-450)  k/uL


 


Neutrophils %    %


 


Lymphocytes %    %


 


Monocytes %    %


 


Eosinophils %    %


 


Basophils %    %


 


Neutrophils #    (1.3-7.7)  k/uL


 


Lymphocytes #    (1.0-4.8)  k/uL


 


Monocytes #    (0-1.0)  k/uL


 


Eosinophils #    (0-0.7)  k/uL


 


Basophils #    (0-0.2)  k/uL


 


PT    (9.0-12.0)  sec


 


INR    (<1.2)  


 


APTT    (22.0-30.0)  sec


 


D-Dimer    (<0.60)  mg/L FEU


 


Sodium    (137-145)  mmol/L


 


Potassium    (3.5-5.1)  mmol/L


 


Chloride    ()  mmol/L


 


Carbon Dioxide    (22-30)  mmol/L


 


Anion Gap    mmol/L


 


BUN    (9-20)  mg/dL


 


Creatinine    (0.66-1.25)  mg/dL


 


Est GFR (CKD-EPI)AfAm    (>60 ml/min/1.73 sqM)  


 


Est GFR (CKD-EPI)NonAf    (>60 ml/min/1.73 sqM)  


 


Glucose    (74-99)  mg/dL


 


Calcium    (8.4-10.2)  mg/dL


 


Magnesium    (1.6-2.3)  mg/dL


 


Total Bilirubin    (0.2-1.3)  mg/dL


 


AST    (17-59)  U/L


 


ALT    (21-72)  U/L


 


Alkaline Phosphatase    ()  U/L


 


Troponin I   <0.012  (0.000-0.034)  ng/mL


 


NT-Pro-B Natriuret Pep  93   pg/mL


 


Total Protein    (6.3-8.2)  g/dL


 


Albumin    (3.5-5.0)  g/dL














Disposition





<Jarret Lovell - Last Filed: 19 22:50>





<ePng August - Last Filed: 19 23:05>


Clinical Impression: 


 COPD exacerbation, Left lower lobe pneumonia, Febrile illness, acute





Disposition: ADMITTED AS IP TO THIS HOSP


Condition: Stable


Referrals: 


Elisa Cobos MD [Primary Care Provider] - 1-2 days

## 2019-06-08 NOTE — CT
EXAMINATION TYPE: CT chest angio for PE

 

DATE OF EXAM: 6/8/2019

 

COMPARISON: 7/22/2018

 

HISTORY: Cough and congestion

 

CT DLP: 331.2 mGycm

Automated exposure control for dose reduction was used.

 

CONTRAST: 

CT Chest for pulmonary embolism performed with with IV Contrast, patient injected with 100 mL of Isov
ue 370.

 

FINDINGS:

 

There are 3-D post processed images.

 

There is coarse interstitial density in the mid and lower lung fields. There is some coalescent infil
trate and atelectasis left posterior lung base. There is no pleural effusion. Heart size is normal.

 

Thoracic aorta is atheromatous. There is no aneurysm or dissection. There is no mediastinal adenopath
y. There is normal contrast opacification of the pulmonary arteries. I see no filling defects. There 
are no hilar masses.

IMPRESSION:

No evidence of pulmonary embolism. Infiltrates in the left lower lobe consistent with inflammatory di
sease.

## 2019-06-09 LAB
GLUCOSE BLD-MCNC: 121 MG/DL (ref 75–99)
GLUCOSE BLD-MCNC: 126 MG/DL (ref 75–99)
GLUCOSE BLD-MCNC: 147 MG/DL (ref 75–99)
GLUCOSE BLD-MCNC: 158 MG/DL (ref 75–99)

## 2019-06-09 RX ADMIN — MEMANTINE HYDROCHLORIDE SCH MG: 10 TABLET ORAL at 20:47

## 2019-06-09 RX ADMIN — LISINOPRIL SCH MG: 2.5 TABLET ORAL at 20:47

## 2019-06-09 RX ADMIN — CEFAZOLIN SCH MLS/HR: 330 INJECTION, POWDER, FOR SOLUTION INTRAMUSCULAR; INTRAVENOUS at 10:39

## 2019-06-09 RX ADMIN — AZITHROMYCIN MONOHYDRATE SCH MLS/HR: 500 INJECTION, POWDER, LYOPHILIZED, FOR SOLUTION INTRAVENOUS at 01:08

## 2019-06-09 RX ADMIN — IPRATROPIUM BROMIDE AND ALBUTEROL SULFATE SCH: .5; 3 SOLUTION RESPIRATORY (INHALATION) at 07:59

## 2019-06-09 RX ADMIN — THERA TABS SCH EACH: TAB at 10:36

## 2019-06-09 RX ADMIN — METHYLPREDNISOLONE SODIUM SUCCINATE SCH MG: 125 INJECTION, POWDER, FOR SOLUTION INTRAMUSCULAR; INTRAVENOUS at 01:22

## 2019-06-09 RX ADMIN — CYANOCOBALAMIN TAB 500 MCG SCH MCG: 500 TAB at 10:37

## 2019-06-09 RX ADMIN — AZITHROMYCIN MONOHYDRATE SCH MLS/HR: 500 INJECTION, POWDER, LYOPHILIZED, FOR SOLUTION INTRAVENOUS at 23:50

## 2019-06-09 RX ADMIN — IPRATROPIUM BROMIDE AND ALBUTEROL SULFATE SCH ML: .5; 3 SOLUTION RESPIRATORY (INHALATION) at 12:09

## 2019-06-09 RX ADMIN — DONEPEZIL HYDROCHLORIDE SCH MG: 10 TABLET ORAL at 20:47

## 2019-06-09 RX ADMIN — METHYLPREDNISOLONE SODIUM SUCCINATE SCH MG: 125 INJECTION, POWDER, FOR SOLUTION INTRAMUSCULAR; INTRAVENOUS at 13:12

## 2019-06-09 RX ADMIN — Medication SCH UNIT: at 10:36

## 2019-06-09 RX ADMIN — ATENOLOL SCH MG: 25 TABLET ORAL at 10:39

## 2019-06-09 RX ADMIN — ASPIRIN 81 MG CHEWABLE TABLET SCH MG: 81 TABLET CHEWABLE at 10:37

## 2019-06-09 RX ADMIN — METHYLPREDNISOLONE SODIUM SUCCINATE SCH MG: 125 INJECTION, POWDER, FOR SOLUTION INTRAMUSCULAR; INTRAVENOUS at 05:51

## 2019-06-09 RX ADMIN — IPRATROPIUM BROMIDE AND ALBUTEROL SULFATE SCH ML: .5; 3 SOLUTION RESPIRATORY (INHALATION) at 16:29

## 2019-06-09 RX ADMIN — CEFAZOLIN SCH: 330 INJECTION, POWDER, FOR SOLUTION INTRAMUSCULAR; INTRAVENOUS at 20:28

## 2019-06-09 RX ADMIN — ENOXAPARIN SODIUM SCH MG: 40 INJECTION SUBCUTANEOUS at 10:39

## 2019-06-09 RX ADMIN — MEMANTINE HYDROCHLORIDE SCH MG: 10 TABLET ORAL at 10:38

## 2019-06-09 RX ADMIN — FAMOTIDINE SCH MG: 20 TABLET, FILM COATED ORAL at 20:47

## 2019-06-09 RX ADMIN — FAMOTIDINE SCH MG: 20 TABLET, FILM COATED ORAL at 10:37

## 2019-06-09 RX ADMIN — INSULIN ASPART SCH: 100 INJECTION, SOLUTION INTRAVENOUS; SUBCUTANEOUS at 17:17

## 2019-06-09 RX ADMIN — IPRATROPIUM BROMIDE AND ALBUTEROL SULFATE SCH ML: .5; 3 SOLUTION RESPIRATORY (INHALATION) at 08:00

## 2019-06-09 RX ADMIN — BUDESONIDE AND FORMOTEROL FUMARATE DIHYDRATE SCH PUFF: 160; 4.5 AEROSOL RESPIRATORY (INHALATION) at 20:11

## 2019-06-09 RX ADMIN — MONTELUKAST SODIUM SCH MG: 10 TABLET, FILM COATED ORAL at 20:47

## 2019-06-09 RX ADMIN — ATORVASTATIN CALCIUM SCH MG: 10 TABLET, FILM COATED ORAL at 20:46

## 2019-06-09 RX ADMIN — MELOXICAM SCH MG: 7.5 TABLET ORAL at 10:38

## 2019-06-09 RX ADMIN — IPRATROPIUM BROMIDE AND ALBUTEROL SULFATE SCH ML: .5; 3 SOLUTION RESPIRATORY (INHALATION) at 20:00

## 2019-06-09 RX ADMIN — INSULIN ASPART SCH: 100 INJECTION, SOLUTION INTRAVENOUS; SUBCUTANEOUS at 20:47

## 2019-06-09 RX ADMIN — INSULIN ASPART SCH UNIT: 100 INJECTION, SOLUTION INTRAVENOUS; SUBCUTANEOUS at 13:13

## 2019-06-09 NOTE — CONS
CONSULTATION



DATE OF SERVICE:

06/09/2019



HISTORY OF PRESENT ILLNESS:

This is a 79-year-old male who comes into the emergency department complaining of

shortness of breath.  The patient states that he has not been feeling well for a good

number of weeks and even longer in regards to shortness of breath.  He also has a

history of type 2 diabetes mellitus, coronary artery disease with previous stents x7,

and a prior PE.  Again, the shortness of breath has been going on for a number of

weeks, now it has been getting any progressively worse.  In addition, he does admit to

some cough.  He does not produce much or any phlegm.  Any phlegm he does cough up is

clear or white.  There is no fever or chills.  No nausea, vomiting or diarrhea.  No

genitourinary complaints.  No chest pain or chest discomfort.  The patient was seen in

the emergency room and was admitted with COPD exacerbation and possible left lower lobe

pneumonia.



Currently, the patient is sitting on the bed.  He is eating his lunch.  He is in no

distress.  He looks like he is doing relatively well.  He does state that he has been

having problems for a number of weeks and maybe even months now with his breathing.



HOME MEDICATIONS:

Include atenolol, famotidine, Zestril, Glucophage, aspirin, albuterol inhaler, vitamin

B12, Mevacor, vitamin D3, Mobic, Singulair, multivitamins, Aricept, Namenda,

butalbital/Tylenol, Mucinex, prednisone with a taper and trazodone.



ALLERGIES:

Denied.



PAST MEDICAL HISTORY:

Positive for chronic bronchial asthma/COPD, coronary artery disease, diabetes mellitus,

DVT, gastroesophageal reflux disease, hyperlipidemia, hypertension, dementia,

myocardial infarction, DJD, pneumonia, pulmonary embolism, and sleep apnea syndrome.

He also suffers from migraine cephalgia.  In addition, the patient has a history of

nephrolithiasis.



SURGICAL HISTORY:

Includes among other things cardiac catheterization with stent.  He has had 7 stents

placed.  He has had a left knee replacement, left Achilles tendon repair and bilateral

cataract surgery.



SOCIAL HISTORY:

Positive for previous heavy tobacco use.  Does not smoke currently.  He denies alcohol

or illicit drug use.



FAMILY HISTORY:

Positive for CAD with bypass grafting and dementia.



REVIEW OF SYSTEMS:

CONSTITUTIONAL:  Negative.

NEUROLOGIC negative.

HEENT negative.

CARDIOVASCULAR negative.

PULMONARY:  Shortness of breath, chest tightness, wheezing, cough and minimal white

phlegm production.  GI negative.

 negative.

RHEUMATOLOGIC negative.

IMMUNOLOGIC negative.

HEMATOLOGIC negative.

DERMATOLOGIC negative.



PHYSICAL EXAMINATION:

VITAL SIGNS: Current vital signs are reviewed.  Temperature is 98, heart rate 94,

respiratory rate 18, blood pressure 116/65, mean 82, room air saturation 94 to 95%.

GENERAL: Appears in no acute distress.  No audible wheezing.  No use of accessory

muscles.  No conversational dyspnea.

HEENT examination is grossly unremarkable.  Mucous membranes are moist.

NECK:  Supple.  Full range of motion.  No adenopathy or thyromegaly.  Neck veins are

flat.

CARDIOVASCULAR examination reveals regular rhythm and rate.  Heart rate about 90 beats

per minute.  S1, S2 normal.  There is no S3, S4, or murmur.

LUNGS:  Reveal mostly clear breath sounds.  A few scattered rhonchi.  There is a

minimal expiratory wheeze.  Not much in the way of crackles.  Breath sounds equal

bilaterally.  Somewhat diminished.

ABDOMEN:  Soft.  Bowel sounds are heard.  EXTREMITIES are intact.  No cyanosis,

clubbing, or edema.

SKIN without rash.

NEUROLOGIC examination is brief but nonfocal.



Initial chest x-ray shows no evidence of cardiopulmonary disease.



CT angiogram of the chest was negative for pulmonary embolism.  There are small/minimal

infiltrates at the lung bases, left greater than right, which could be consistent with

infiltrate/atelectasis/inflammatory changes.  The changes are very minimal at best.



LAB DATA:

Reviewed.  White count 11.5, hemoglobin 14, hematocrit 41.8, platelet count 196,000,

PT/INR PTT normal.  D-dimer 1.03.  Sodium 142, potassium 4.1 chloride 109, CO2 of 21,

anion gap 10. BUN and creatinine were 19 and 0.85.



Troponins were negative x2.  N terminal proBNP was normal.



ASSESSMENT:

1. Chronic obstructive pulmonary disease exacerbation/asthma exacerbation, much

    improved.

2. Doubt significant pneumonia left lower lobe.  CT scan to me looks more like

    atelectasis and/or minimal inflammatory response.

3. History of coronary artery disease.

4. Diabetes mellitus.

5. Deep venous thrombosis.

6. Pulmonary embolism.

7. Gastroesophageal reflux disease.

8. Hyperlipidemia.

9. Hypertension.

10.Dementia.

11.Myocardial infarction.

12.Degenerative joint disease.

13.History of pulmonary embolism.

14.Sleep apnea, maintained on CPAP.

15.Migraine cephalgia.

16.Multiple other medical problems and comorbidities.



PLAN:

The patient's medications are reviewed.  He appears to have a very mild COPD

exacerbation.  I do not believe there is a strong history to suggest pneumonia.  His CT

scan is not too impressive.  Additional recommendations and suggestions are

forthcoming.  Please see my changes as made in his chart.  Additional recommendations

will be made as the patient remains further in the hospital.





MMODL / IJN: [id number] / Job#: [id number]

## 2019-06-09 NOTE — P.HPIM
History of Present Illness


H&P Date: 19


Chief Complaint: Shortness breath





This is 79 years old male with past medical history significant for coronary 

artery disease presents to the emergency department with worsening shortness of 

breath.  Patient stated that he's been declining with his functional status for 

the last 2 month but about week ago he started developing productive cough for a

green/brown phlegm with worsening in his clinical condition where patient was 

not able to do his normal daily activity by walking out of this house and taking

care of things that he does on normal basis.  Patient denied any fever or chills

but admitted feeding week and tired with decreased oral appetite but patient was

not able to come into the hospital as he has sick wife at home that he needs to 

take care of it but yesterday patient was extremely short of breath and fatigue 

and determent to come into the emergency department.  Patient the stated last 

time he was in the hospital a few years ago denied being in contact of sick 

people except for his wife.  Patient denied tobacco alcohol or drug abuse.  

Patient was seen in the emergency department with computed tomography scan of 

the chest which was positive for left lower lobe infiltrate was started on 

antibiotics and admitted to the medical floor.  Patient denied any recent weight

loss and stated that his weight usually around 210.  Patient denied any recent 

upper respiratory symptoms other with T mentioned above.  Patient denied any 

night sweats or low-grade fever





Review of Systems





All 14 systems reviewed and negative except as above





Past Medical History


Past Medical History: Asthma, Coronary Artery Disease (CAD), COPD, Diabetes 

Mellitus, Deep Vein Thrombosis (DVT), GERD/Reflux, Hyperlipidemia, Hypertension,

Memory Impairment, Myocardial Infarction (MI), Osteoarthritis (OA), Pneumonia, 

Pulmonary Embolus (PE), Skin Disorder, Sleep Apnea/CPAP/BIPAP


Additional Past Medical History / Comment(s): Migraines.  KATHY PE and LT DVT 

2016.  Bruises easily.  Hx Kidney Stones.  Dementia.  USES CPAP.


Last Myocardial Infarction Date::  AND 


History of Any Multi-Drug Resistant Organisms: None Reported


Past Surgical History: Heart Catheterization With Stent


Additional Past Surgical History / Comment(s): 7 STENTS.  LT KNEE REPLACEMENT.  

LT ACHILLES TENDON REPAIR.   bilat CATARACT 16.


Past Anesthesia/Blood Transfusion Reactions: Previous Problems w/ Anesthesia


Additional Past Anesthesia/Blood Transfusion Reaction / Comment(s): DIFFICULTY 

WAKING UP.


Date of Last Stent Placement:: 


Past Psychological History: Anxiety, Depression, Panic Disorder


Additional Psychological History / Comment(s): SINCE , GETS MILD DEPRESSION 

DURING WINTER MONTHS(SAD). Dementia


Smoking Status: Former smoker


Past Alcohol Use History: None Reported


Additional Past Alcohol Use History / Comment(s): SMOKED >40 YEARS, 3PPD, QUIT 

.


Past Drug Use History: None Reported





- Past Family History


  ** Father


Family Medical History: Dementia (Father  at age of 80 at extended care 

facility from dementia.)





  ** Mother


Family Medical History: Coronary Artery Disease (CAD)


Additional Family Medical History / Comment(s): QUAD BYPASS





  ** Brother(s)


Family Medical History: Coronary Artery Disease (CAD)





  ** Sister(s)


Family Medical History: No Reported History





  ** Daughter(s)


Family Medical History: No Reported History





  ** Son(s)


Family Medical History: No Reported History





Medications and Allergies


                                Home Medications











 Medication  Instructions  Recorded  Confirmed  Type


 


Atenolol [Tenormin] 25 mg PO QAM 14 History


 


Famotidine 40 mg PO BID 14 History


 


Lisinopril [Zestril] 2.5 mg PO HS 14 History


 


metFORMIN HCL [Glucophage] 500 mg PO BID 14 History


 


Aspirin 81 mg PO QAM 16 History


 


Albuterol Inhaler [Ventolin Hfa 1 puff INHALATION RT-Q6H PRN 16 

History





Inhaler]    


 


Cyanocobalamin [Vitamin B-12] 250 mcg PO DAILY 16 History


 


Lovastatin [Mevacor] 40 mg PO HS 16 History


 


Cholecalciferol [Vitamin D3 (25 1,000 unit PO DAILY 17 History





Mcg = 1000 Iu)]    


 


Meloxicam 15 mg PO DAILY 17 History


 


Montelukast [Singulair] 10 mg PO HS 17 History


 


Multivitamins, Thera [Multivitamin 1 tab PO DAILY 17 History





(formulary)]    


 


Donepezil [Aricept] 10 mg PO HS 18 History


 


Memantine [Namenda] 10 mg PO BID 18 History


 


Butalbital/Acetaminophen 1 - 2 cap PO Q4H PRN 19 History





[Butalbital/Acetaminophen 50-300mg]    


 


guaiFENesin [Mucinex] 600 mg PO BID 19 History


 


predniSONE See Taper PO AS DIRECTED 19 History


 


traZODone HCL 50 mg PO HS 19 History








                                    Allergies











Allergy/AdvReac Type Severity Reaction Status Date / Time


 


No Known Allergies Allergy   Verified 19 22:44














Physical Exam


Vitals: 


                                   Vital Signs











  Temp Pulse Pulse Resp BP BP Pulse Ox


 


 19 08:02   92     


 


 19 07:32  96.8 F L   83  16   134/77  96


 


 19 00:41  98.5 F   97  15   119/69  96


 


 19 00:17    97  18   


 


 19 23:46  99.8 F H  101 H   18  141/67   95


 


 19 22:00   109 H   20  138/72   93 L


 


 19 20:37   101 H   20   


 


 19 20:22   96   18   


 


 19 19:45  99.8 F H  107 H   20  153/75   94 L








                                Intake and Output











 19





 22:59 06:59 14:59


 


Intake Total  800 240


 


Balance  800 240


 


Intake:   


 


  Intake, IV Titration  800 





  Amount   


 


    Azithromycin 500 mg In  250 





    Sodium Chloride 0.9% 250   





    ml @ 250 mls/hr IVPB Q24H   





    [facility] Rx#:[id number]   


 


    Sodium Chloride 0.9% 1,  500 





    000 ml @ 100 mls/hr IV .   





    Q10H [facility] Rx#:[id number]   


 


    cefTRIAXone 1 gm In  50 





    Sodium Chloride 0.9% 50   





    ml @ 100 mls/hr IVPB Q24H   





    [facility] Rx#:[id number]   


 


  Oral   240


 


Other:   


 


  Voiding Method   Toilet


 


  # Voids  1 


 


  Weight 97.522 kg  














Gen.:  in stated age, no acute distress


Heart: Normal S1-S2


Lungs: Coarse breathing sounds bilaterally with scattered rhonchi and fine 

expiratory wheezing, decreased air entry on the left lower lobe


Abdomen: Soft, no tenderness, positive bowel sounds in all 4 quadrant no 

guarding or rebound


Skin: No new rash


Psych: Alert and oriented 3


Neuro: No focal deficit





Results


CBC & Chem 7: 


                                 19 20:33





                                 19 20:33


Labs: 


                  Abnormal Lab Results - Last 24 Hours (Table)











  19 Range/Units





  20:33 20:33 20:33 


 


WBC  11.5 H    (3.8-10.6)  k/uL


 


Neutrophils #  8.6 H    (1.3-7.7)  k/uL


 


D-Dimer    1.03 H  (<0.60)  mg/L FEU


 


Chloride   109 H   ()  mmol/L


 


Carbon Dioxide   21 L   (22-30)  mmol/L


 


Glucose   133 H   (74-99)  mg/dL


 


POC Glucose (mg/dL)     (75-99)  mg/dL














  19 Range/Units





  07:34 


 


WBC   (3.8-10.6)  k/uL


 


Neutrophils #   (1.3-7.7)  k/uL


 


D-Dimer   (<0.60)  mg/L FEU


 


Chloride   ()  mmol/L


 


Carbon Dioxide   (22-30)  mmol/L


 


Glucose   (74-99)  mg/dL


 


POC Glucose (mg/dL)  158 H  (75-99)  mg/dL














Thrombosis Risk Factor Assmnt





- Choose All That Apply


Any of the Below Risk Factors Present?: Yes


Each Factor Represents 1 point: Abnormal pulmonary function (COPD), Medical pt 

on bed rest, Obesity (BMI >25), Serious lung disease incl. pneumonia (< 1month),

 Swollen legs (current)


Other Risk Factors: Yes


Each Risk Factor Represents 3 Points: Age 75 years or older


Thrombosis Risk Factor Assessment Total Risk Factor Score: 8


Thrombosis Risk Factor Assessment Level: High Risk





Assessment and Plan


Assessment: 





1.  Shortness of breath.


2.  Left lower lobe pneumonia, community-acquired pneumonia.


3.  COPD with mild exacerbation.


4.  Mild leukocytosis.


5.  Diabetes mellitus type 2 non-insulin-dependent.


6.  Hypertension.


7.  GERD area


8.  Mild dementia area


9.  Coronary artery disease, compensated.


10.  History of pulmonary embolism.


11.  Obstructive sleep apnea on CPAP.


12.  History of DVT.


I would like to start patients on Rocephin and Zithromax for total of 5 days, 

aggressive pulmonary hygiene, add Pulmicort twice daily and consider short c

ourse of systemic steroids based on pulmonary recommendation, obtain sputum 

culture, start patients on Lovenox for DVT prophylaxis, close follow-up 

outpatient with primary care physician to resolution.  Resume home medication.  

Start patients on insulin sliding scale.  Have physical and acute patient will 

therapy evaluated the patient and home oxygen evaluation prior to discharge

## 2019-06-10 LAB
GLUCOSE BLD-MCNC: 115 MG/DL (ref 75–99)
GLUCOSE BLD-MCNC: 115 MG/DL (ref 75–99)
GLUCOSE BLD-MCNC: 119 MG/DL (ref 75–99)
GLUCOSE BLD-MCNC: 203 MG/DL (ref 75–99)
HBA1C MFR BLD: 6.1 % (ref 4–6)

## 2019-06-10 RX ADMIN — INSULIN ASPART SCH: 100 INJECTION, SOLUTION INTRAVENOUS; SUBCUTANEOUS at 19:09

## 2019-06-10 RX ADMIN — ENOXAPARIN SODIUM SCH MG: 40 INJECTION SUBCUTANEOUS at 10:24

## 2019-06-10 RX ADMIN — ATORVASTATIN CALCIUM SCH MG: 10 TABLET, FILM COATED ORAL at 21:04

## 2019-06-10 RX ADMIN — ASPIRIN 81 MG CHEWABLE TABLET SCH MG: 81 TABLET CHEWABLE at 10:22

## 2019-06-10 RX ADMIN — INSULIN ASPART SCH: 100 INJECTION, SOLUTION INTRAVENOUS; SUBCUTANEOUS at 07:46

## 2019-06-10 RX ADMIN — INSULIN ASPART SCH: 100 INJECTION, SOLUTION INTRAVENOUS; SUBCUTANEOUS at 12:30

## 2019-06-10 RX ADMIN — DONEPEZIL HYDROCHLORIDE SCH MG: 10 TABLET ORAL at 21:05

## 2019-06-10 RX ADMIN — MELOXICAM SCH MG: 7.5 TABLET ORAL at 10:24

## 2019-06-10 RX ADMIN — MEMANTINE HYDROCHLORIDE SCH MG: 10 TABLET ORAL at 21:05

## 2019-06-10 RX ADMIN — MEMANTINE HYDROCHLORIDE SCH MG: 10 TABLET ORAL at 10:27

## 2019-06-10 RX ADMIN — IPRATROPIUM BROMIDE AND ALBUTEROL SULFATE SCH ML: .5; 3 SOLUTION RESPIRATORY (INHALATION) at 16:59

## 2019-06-10 RX ADMIN — LISINOPRIL SCH MG: 2.5 TABLET ORAL at 21:05

## 2019-06-10 RX ADMIN — ATENOLOL SCH MG: 25 TABLET ORAL at 10:23

## 2019-06-10 RX ADMIN — CEFAZOLIN SCH: 330 INJECTION, POWDER, FOR SOLUTION INTRAMUSCULAR; INTRAVENOUS at 05:07

## 2019-06-10 RX ADMIN — IPRATROPIUM BROMIDE AND ALBUTEROL SULFATE SCH ML: .5; 3 SOLUTION RESPIRATORY (INHALATION) at 08:37

## 2019-06-10 RX ADMIN — BUDESONIDE AND FORMOTEROL FUMARATE DIHYDRATE SCH PUFF: 160; 4.5 AEROSOL RESPIRATORY (INHALATION) at 21:01

## 2019-06-10 RX ADMIN — CEFAZOLIN SCH: 330 INJECTION, POWDER, FOR SOLUTION INTRAMUSCULAR; INTRAVENOUS at 16:28

## 2019-06-10 RX ADMIN — IPRATROPIUM BROMIDE AND ALBUTEROL SULFATE SCH ML: .5; 3 SOLUTION RESPIRATORY (INHALATION) at 21:01

## 2019-06-10 RX ADMIN — CYANOCOBALAMIN TAB 500 MCG SCH MCG: 500 TAB at 10:23

## 2019-06-10 RX ADMIN — FAMOTIDINE SCH MG: 20 TABLET, FILM COATED ORAL at 10:24

## 2019-06-10 RX ADMIN — THERA TABS SCH EACH: TAB at 10:27

## 2019-06-10 RX ADMIN — MONTELUKAST SODIUM SCH MG: 10 TABLET, FILM COATED ORAL at 21:05

## 2019-06-10 RX ADMIN — INSULIN ASPART SCH UNIT: 100 INJECTION, SOLUTION INTRAVENOUS; SUBCUTANEOUS at 21:04

## 2019-06-10 RX ADMIN — FAMOTIDINE SCH MG: 20 TABLET, FILM COATED ORAL at 21:05

## 2019-06-10 RX ADMIN — AZITHROMYCIN SCH MG: 500 TABLET, FILM COATED ORAL at 21:13

## 2019-06-10 RX ADMIN — BUDESONIDE AND FORMOTEROL FUMARATE DIHYDRATE SCH PUFF: 160; 4.5 AEROSOL RESPIRATORY (INHALATION) at 08:37

## 2019-06-10 RX ADMIN — Medication SCH UNIT: at 10:23

## 2019-06-10 RX ADMIN — IPRATROPIUM BROMIDE AND ALBUTEROL SULFATE SCH ML: .5; 3 SOLUTION RESPIRATORY (INHALATION) at 13:03

## 2019-06-10 NOTE — P.PN
Subjective


Progress Note Date: 06/10/19





This is 79 years old male with past medical history significant for coronary 

artery disease presents to the emergency department with worsening shortness of 

breath.  Patient stated that he's been declining with his functional status for 

the last 2 month but about week ago he started developing productive cough for a

green/brown phlegm with worsening in his clinical condition where patient was 

not able to do his normal daily activity by walking out of this house and taking

care of things that he does on normal basis.  Patient denied any fever or chills

but admitted feeding week and tired with decreased oral appetite but patient was

not able to come into the hospital as he has sick wife at home that he needs to 

take care of it but yesterday patient was extremely short of breath and fatigue 

and determent to come into the emergency department.  Patient the stated last 

time he was in the hospital a few years ago denied being in contact of sick 

people except for his wife.  Patient denied tobacco alcohol or drug abuse.  

Patient was seen in the emergency department with computed tomography scan of 

the chest which was positive for left lower lobe infiltrate was started on 

antibiotics and admitted to the medical floor.  Patient denied any recent weight

loss and stated that his weight usually around 210.  Patient denied any recent 

upper respiratory symptoms other with T mentioned above.  Patient denied any 

night sweats or low-grade fever








6/10: The patient has been seen by Dr. Mack and plan to continue same 

medications.  He is doubtful that there is any pneumonia and feels is more 

likely atelectasis or minimal inflammatory response.  Patient has been afebrile,

heart rate 96, blood pressure 160/89, pulse ox 97% on room air.  Blood sugars 

are running 115 to 126.  Sputum culture in progress.  Troponins of been negative

on 3 draws.





Objective





- Vital Signs


Vital signs: 


                                   Vital Signs











Temp  98.5 F   06/10/19 07:00


 


Pulse  80   06/10/19 13:03


 


Resp  12   06/10/19 07:00


 


BP  160/89   06/10/19 07:00


 


Pulse Ox  97   06/10/19 07:00








                                 Intake & Output











 06/09/19 06/10/19 06/10/19





 18:59 06:59 18:59


 


Intake Total 1980 250 


 


Balance 1980 250 


 


Intake:   


 


  Intake, IV Titration  250 





  Amount   


 


    Azithromycin 500 mg In  250 





    Sodium Chloride 0.9% 250   





    ml @ 250 mls/hr IVPB Q24H   





    [facility] Rx#:[id number]   


 


  Oral 1980  


 


Other:   


 


  Voiding Method Toilet Toilet 


 


  # Voids 3 2 














- Exam





Review Of Systems:


Constitutional: No fever, no chills, no night sweats.  No weight change.  No 

weakness, fatigue or lethargy.  No daytime sleepiness.


EENT: No headache.  No blurred vision or double vision, no loss of vision.  No 

loss of Hearing, no ringing in the ears, no dizziness.  No nasal drainage or 

congestion.  No epistaxis.  No sore throat.


Lungs: Reports shortness of breath, reports cough, no sputum production.  

Reports wheezing.


Cardiovascular: No chest pain, no lower extremity edema.  No palpitations.  No 

paroxysmal nocturnal dyspnea.  No orthopnea.  No lightheadedness or dizziness.  

No syncopal episodes.


Abdominal: No abdominal pain.  No nausea, vomiting.  No diarrhea.  No 

constipation.  No bloody or tarry stools..  No loss of appetite.


Genitourinary: No dysuria, increased frequency, urgency.  No urinary retention.


Musculoskeletal: No myalgias.  No muscle weakness, no gait dysfunction, no 

frequent falls.  No back pain.  No neck pain.


Integumentary: No wounds, no lesions.  No rash or pruritus.  No unusual 

bruising.  No change in hair or nails.


Neurologic: No aphasia. No facial droop. No change in mentation. No head injury.

No headache. No paralysis. No paresthesia.


Psychiatric: No depression.  No anxiety.  No mood swings.


Endocrine: No abnormal blood sugars.  No weight change.  No excessive sweating 

or thirst.  No cold intolerance.  














Gen: This is a 79-year-old  male.  No acute respiratory distress.


HEENT: Head is atraumatic, normocephalic. Pupils equal, round. Sclerae is 

anicteric.  Patient very hard of hearing.


NECK: Supple. No JVD. No lymphadenopathy. No thyromegaly. 


LUNGS: Coarse breath sounds throughout with scattered rhonchi and expiratory 

wheeze..  No intercostal retractions.


HEART: Regular rate and rhythm. No murmur. 


ABDOMEN: Soft. Bowel sounds are present. No masses.  No tenderness.


EXTREMITIES: No pedal edema.  No calf tenderness.


NEUROLOGICAL: Patient is awake, alert and oriented x3. Cranial nerves 2 through 

12 are grossly intact. 








- Labs


CBC & Chem 7: 


                                 06/08/19 20:33





                                 06/08/19 20:33


Labs: 


                  Abnormal Lab Results - Last 24 Hours (Table)











  06/08/19 06/09/19 06/09/19 Range/Units





  20:33 16:54 20:28 


 


POC Glucose (mg/dL)   126 H  121 H  (75-99)  mg/dL


 


Hemoglobin A1c  6.1 H    (4.0-6.0)  %














  06/10/19 06/10/19 Range/Units





  07:10 11:52 


 


POC Glucose (mg/dL)  115 H  119 H  (75-99)  mg/dL


 


Hemoglobin A1c    (4.0-6.0)  %








                      Microbiology - Last 24 Hours (Table)











 06/09/19 12:31 Gram Stain - Preliminary





 Sputum Sputum Culture - Preliminary














Assessment and Plan


Plan: 





1.  Shortness of breath secondary to mild COPD exacerbation.  Consult with Dr. Italia guillermo.  Continue DuoNeb treatments, Symbicort, oral prednisone 40

mg daily.


2.  Left lower lobe pneumonia, community-acquired pneumonia doubtful per 

pulmonary medicine.  Most likely atelectasis and/or minimal inflammatory 

response.  Continue azithromycin.


3.  Hypertension.Continue lisinopril, atenolol


4.  Hyperlipidemia.  Continue Lipitor.


5.  Diabetes mellitus type 2.  Continue metformin, NovoLog scale.


6.  History of myocardial infarction and coronary artery disease.  Continue asp

irin 81 mg daily, Lipitor 10 mg daily.


7.  GERD


8.  Mild dementia.  Continue Aricept and Namenda.  


9.  Migraines.


10.  History of pulmonary embolism.


11.  Obstructive sleep apnea on CPAP.


12.  History of DVT.





Discharge plan: Home





Impression and plan of care have been directed as dictated by the signing 

physician.  Sharon Walker nurse practitioner acting as scribe for signing 

physician.

## 2019-06-10 NOTE — P.PN
Subjective


Progress Note Date: 06/10/19








On today's evaluation, 06/10/2019 the patient is being seen in follow-up.  The 

patient is recovering from acute COPD exacerbation.  The patient also has an 

underlying left lower lobe pneumonia is clearly indicated on the CAT scan of the

chest.  He remains on antibiotics.  He is still cough and is congested.  Unable 

to bring up much sputum.  We'll were able to collect a sputum sample of the 

cultures still pending for now.  No fever.  No chills.  Blood sugars of 

nonelevated this point in time.  Troponins were all negative.  He remains on 

bronchodilators and steroids.  He is ambulating.  He is receiving DuoNeb 

nebulized treatments around the clock.  He is on a combination of Symbicort as 

maintenance 2 puffs twice a day, prednisone burst taper and oral Zithromax 500 

mg by mouth on a daily basis.  Outpatient medication been ordered resume.





Objective





- Vital Signs


Vital signs: 


                                   Vital Signs











Temp  98.5 F   06/10/19 07:00


 


Pulse  88   06/10/19 17:08


 


Resp  12   06/10/19 07:00


 


BP  160/89   06/10/19 07:00


 


Pulse Ox  97   06/10/19 07:00








                                 Intake & Output











 06/09/19 06/10/19 06/10/19





 18:59 06:59 18:59


 


Intake Total 1980 250 


 


Balance 1980 250 


 


Intake:   


 


  Intake, IV Titration  250 





  Amount   


 


    Azithromycin 500 mg In  250 





    Sodium Chloride 0.9% 250   





    ml @ 250 mls/hr IVPB Q24H   





    [facility] Rx#:[id number]   


 


  Oral 1980  


 


Other:   


 


  Voiding Method Toilet Toilet 


 


  # Voids 3 2 














- Exam








Gen: This is a 79-year-old  male.  No acute respiratory distress.


Head exam was generally normal. There was no scleral icterus or corneal arcus. 

Mucous membranes were moist.


Neck was supple and without jugular venous distension, thyromegaly, or carotid 

bruits. Carotids were easily palpable bilaterally. There was no adenopathy.  The

patient is hard of hearing.


LUNGS: Coarse breath sounds throughout with scattered rhonchi and expiratory 

wheeze..  No intercostal retractions.


HEART: Regular rate and rhythm. No murmur. 


ABDOMEN: Soft. Bowel sounds are present. No masses.  No tenderness.


EXTREMITIES: No pedal edema.  No calf tenderness.


NEUROLOGICAL: Patient is awake, alert and oriented x3. Cranial nerves 2 through 

12 are grossly intact.  There is underlying dementia


Examination of the skin revealed no evidence of significant rashes, suspicious 

appearing nevi or other concerning lesions.





- Labs


CBC & Chem 7: 


                                 06/08/19 20:33





                                 06/08/19 20:33


Labs: 


                  Abnormal Lab Results - Last 24 Hours (Table)











  06/08/19 06/09/19 06/10/19 Range/Units





  20:33 20:28 07:10 


 


POC Glucose (mg/dL)   121 H  115 H  (75-99)  mg/dL


 


Hemoglobin A1c  6.1 H    (4.0-6.0)  %














  06/10/19 06/10/19 Range/Units





  11:52 17:03 


 


POC Glucose (mg/dL)  119 H  115 H  (75-99)  mg/dL


 


Hemoglobin A1c    (4.0-6.0)  %








                      Microbiology - Last 24 Hours (Table)











 06/09/19 12:31 Gram Stain - Preliminary





 Sputum Sputum Culture - Preliminary














Assessment and Plan


Plan: 








1 acute left lower lobe pneumonia currently on Zithromax.  Awaiting sputum Gram 

stain and culture.





2 acute COPD exacerbation most likely secondary to underlying left lower lobe 

pneumonia.





3 shortness of breath secondary to above, improving





For dementia





5 hypertension





6 hyperlipidemia





7 diabetes mellitus type 2





8 coronary artery disease with previous MI





9 migraines





10 remote history of pulmonary embolism currently on no anticoagulants





11 history of obstructive sleep apnea maintained on CPAP on outpatient basis





Plan





Awaiting sputum Gram stain and culture.  Repeat chest x-ray with the next 24-48 

hours.  Continue current treatment for now.  Agree on the prednisone burst 

taper.  Continue the Zithromax for now.  May consider bronchoscopy if there is 

no improvement clinically in regards to his pneumonia and septic exacerbation.  

Add Rocephin.  We'll continue to follow.

## 2019-06-11 LAB
GLUCOSE BLD-MCNC: 116 MG/DL (ref 75–99)
GLUCOSE BLD-MCNC: 139 MG/DL (ref 75–99)
GLUCOSE BLD-MCNC: 179 MG/DL (ref 75–99)
GLUCOSE BLD-MCNC: 95 MG/DL (ref 75–99)

## 2019-06-11 RX ADMIN — INSULIN ASPART SCH: 100 INJECTION, SOLUTION INTRAVENOUS; SUBCUTANEOUS at 20:23

## 2019-06-11 RX ADMIN — IPRATROPIUM BROMIDE AND ALBUTEROL SULFATE SCH ML: .5; 3 SOLUTION RESPIRATORY (INHALATION) at 11:30

## 2019-06-11 RX ADMIN — INSULIN ASPART SCH UNIT: 100 INJECTION, SOLUTION INTRAVENOUS; SUBCUTANEOUS at 17:34

## 2019-06-11 RX ADMIN — MONTELUKAST SODIUM SCH MG: 10 TABLET, FILM COATED ORAL at 20:22

## 2019-06-11 RX ADMIN — MELOXICAM SCH MG: 7.5 TABLET ORAL at 08:17

## 2019-06-11 RX ADMIN — INSULIN ASPART SCH: 100 INJECTION, SOLUTION INTRAVENOUS; SUBCUTANEOUS at 12:20

## 2019-06-11 RX ADMIN — MEMANTINE HYDROCHLORIDE SCH MG: 10 TABLET ORAL at 20:23

## 2019-06-11 RX ADMIN — DONEPEZIL HYDROCHLORIDE SCH MG: 10 TABLET ORAL at 20:22

## 2019-06-11 RX ADMIN — AZITHROMYCIN SCH MG: 500 TABLET, FILM COATED ORAL at 20:23

## 2019-06-11 RX ADMIN — ENOXAPARIN SODIUM SCH MG: 40 INJECTION SUBCUTANEOUS at 08:18

## 2019-06-11 RX ADMIN — FAMOTIDINE SCH MG: 20 TABLET, FILM COATED ORAL at 08:17

## 2019-06-11 RX ADMIN — MEMANTINE HYDROCHLORIDE SCH MG: 10 TABLET ORAL at 08:17

## 2019-06-11 RX ADMIN — FAMOTIDINE SCH MG: 20 TABLET, FILM COATED ORAL at 20:23

## 2019-06-11 RX ADMIN — Medication SCH UNIT: at 08:17

## 2019-06-11 RX ADMIN — IPRATROPIUM BROMIDE AND ALBUTEROL SULFATE SCH ML: .5; 3 SOLUTION RESPIRATORY (INHALATION) at 08:14

## 2019-06-11 RX ADMIN — THERA TABS SCH EACH: TAB at 08:17

## 2019-06-11 RX ADMIN — CYANOCOBALAMIN TAB 500 MCG SCH MCG: 500 TAB at 08:17

## 2019-06-11 RX ADMIN — INSULIN ASPART SCH: 100 INJECTION, SOLUTION INTRAVENOUS; SUBCUTANEOUS at 07:35

## 2019-06-11 RX ADMIN — ATENOLOL SCH MG: 25 TABLET ORAL at 08:18

## 2019-06-11 RX ADMIN — ATORVASTATIN CALCIUM SCH MG: 10 TABLET, FILM COATED ORAL at 20:22

## 2019-06-11 RX ADMIN — BUDESONIDE SCH MG: 1 SUSPENSION RESPIRATORY (INHALATION) at 20:03

## 2019-06-11 RX ADMIN — Medication SCH MG: at 20:22

## 2019-06-11 RX ADMIN — BUDESONIDE AND FORMOTEROL FUMARATE DIHYDRATE SCH PUFF: 160; 4.5 AEROSOL RESPIRATORY (INHALATION) at 08:14

## 2019-06-11 RX ADMIN — ASPIRIN 81 MG CHEWABLE TABLET SCH MG: 81 TABLET CHEWABLE at 08:18

## 2019-06-11 RX ADMIN — IPRATROPIUM BROMIDE AND ALBUTEROL SULFATE SCH ML: .5; 3 SOLUTION RESPIRATORY (INHALATION) at 15:58

## 2019-06-11 RX ADMIN — IPRATROPIUM BROMIDE AND ALBUTEROL SULFATE SCH ML: .5; 3 SOLUTION RESPIRATORY (INHALATION) at 20:03

## 2019-06-11 RX ADMIN — LISINOPRIL SCH MG: 2.5 TABLET ORAL at 20:23

## 2019-06-11 NOTE — P.PN
Subjective


Progress Note Date: 06/11/19


Principal diagnosis: 





Acute left lower lobe pneumonia.





The patient is seen today 06/11/2019 in follow-up on the regular medical floor. 

He is currently awake and alert in no acute distress.  Maintaining O2 

saturations in the 90s on room air.  He is currently afebrile.  Hemodynamically 

stable.  Sputum culture reveals no growth.  Remains on DuoNeb inhalations, 

Symbicort, antibiotics in the form of ceftriaxone and azithromycin.  On oral 

prednisone.





Objective





- Vital Signs


Vital signs: 


                                   Vital Signs











Temp  97.8 F   06/11/19 07:00


 


Pulse  70   06/11/19 11:41


 


Resp  12   06/11/19 07:00


 


BP  123/74   06/11/19 07:00


 


Pulse Ox  96   06/11/19 08:16








                                 Intake & Output











 06/10/19 06/11/19 06/11/19





 18:59 06:59 18:59


 


Intake Total 800 50 850


 


Output Total  500 


 


Balance 800 -450 850


 


Intake:   


 


  IV   850


 


    Sodium Chloride 0.9% 1,   800





    000 ml @ 100 mls/hr IV .   





    Q10H [facility] Rx#:[id number]   


 


    cefTRIAXone 1 gm In   50





    Sodium Chloride 0.9% 50   





    ml @ 100 mls/hr IVPB   





    Q24HR [facility] Rx#:[id number]   


 


  Intake, IV Titration 300 50 





  Amount   


 


    Azithromycin 500 mg In 250  





    Sodium Chloride 0.9% 250   





    ml @ 250 mls/hr IVPB Q24H   





    [facility] Rx#:[id number]   


 


    cefTRIAXone 1 gm In 50 50 





    Sodium Chloride 0.9% 50   





    ml @ 100 mls/hr IVPB   





    Q24HR [facility] Rx#:[id number]   


 


  Oral 500  


 


Output:   


 


  Urine  500 


 


Other:   


 


  Voiding Method  Toilet 


 


  # Voids  2 














- Exam





GENERAL EXAM: Alert, active, comfortable in no apparent distress.  On room air.


HEAD: Normocephalic.


EYES: Normal reaction of pupils, equal size.


NOSE: Clear with pink turbinates.


THROAT: No erythema or exudates.


NECK: No masses, no JVD.


CHEST: No chest wall deformity.


LUNGS: Equal air entry with scattered rhonchi, crackles in left posterior base


CVS: S1 and S2 normal with no audible murmur, regular rhythm.


ABDOMEN: No hepatosplenomegaly, normal bowel sounds, no guarding or rigidity.


SPINE: No scoliosis or deformity


SKIN: No rashes


CENTRAL NERVOUS SYSTEM: No focal deficits, tone is normal in all 4 extremities.


EXTREMITIES: There is no peripheral edema.  No clubbing, no cyanosis.  

Peripheral pulses are intact.





- Labs


CBC & Chem 7: 


                                 06/08/19 20:33





                                 06/08/19 20:33


Labs: 


                  Abnormal Lab Results - Last 24 Hours (Table)











  06/08/19 06/10/19 06/10/19 Range/Units





  20:33 17:03 20:33 


 


POC Glucose (mg/dL)   115 H  203 H  (75-99)  mg/dL


 


Hemoglobin A1c  6.1 H    (4.0-6.0)  %














  06/11/19 Range/Units





  11:57 


 


POC Glucose (mg/dL)  116 H  (75-99)  mg/dL


 


Hemoglobin A1c   (4.0-6.0)  %








                      Microbiology - Last 24 Hours (Table)











 06/09/19 12:31 Gram Stain - Final





 Sputum Sputum Culture - Final














Assessment and Plan


Assessment: 





Impression:





1 acute left lower lobe pneumonia currently on Zithromax.  Awaiting sputum Gram 

stain and culture.





2 acute COPD exacerbation most likely secondary to underlying left lower lobe 

pneumonia.





3 shortness of breath secondary to above, improving





For dementia





5 hypertension





6 hyperlipidemia





7 diabetes mellitus type 2





8 coronary artery disease with previous MI





9 migraines





10 remote history of pulmonary embolism currently on no anticoagulants





11 history of obstructive sleep apnea maintained on CPAP on outpatient basis





Plan:





The patient was seen and evaluated by Dr. Echavarria.  Chest x-ray and labs 

reviewed.  We'll continue with the current treatment plan.  Increase his 

activity as tolerated.  We'll continue to follow.





I, the cosigning physician, performed a history & physical examination of the 

patient. Lungs sounds with few scattered rhonchi, crackles in left posterior 

base. Maintaining good O2 saturations in the 90s on room air.  I discussed the 

assessment and plan of care with my nurse practitioner, Gege Kaye. I attest to 

the above note as dictated by her.

## 2019-06-11 NOTE — XR
EXAMINATION TYPE: XR chest 2V

 

DATE OF EXAM: 6/11/2019

 

COMPARISON: 6/8/2019 chest x-ray and CT dated 6/8/2019

 

HISTORY: Follow-up for pneumonia

 

TECHNIQUE:  Frontal and lateral views of the chest are obtained.

 

FINDINGS:  The tree-in-bud opacity seen on the prior CT of 6/8/2019 are not seen well radiographicall
y. There is minimal peribronchial cuffing remaining on the lateral view in the left lower lobe, super
ior segment. Remainder the lungs are clear. Moderate multilevel degenerative changes of the spine. No
 pneumothorax or pleural effusion. Pulmonary hyperinflation relates to mild underlying COPD.

 

IMPRESSION:  Tree-in-bud opacities are better seen on CT indicative of reactive or infectious pneumon
itis. However there remains mild peribronchial cuffing in the superior segment of the left lower lobe
 on the lateral view only.

## 2019-06-12 LAB
GLUCOSE BLD-MCNC: 101 MG/DL (ref 75–99)
GLUCOSE BLD-MCNC: 103 MG/DL (ref 75–99)
GLUCOSE BLD-MCNC: 140 MG/DL (ref 75–99)
GLUCOSE BLD-MCNC: 169 MG/DL (ref 75–99)

## 2019-06-12 RX ADMIN — FAMOTIDINE SCH MG: 20 TABLET, FILM COATED ORAL at 09:27

## 2019-06-12 RX ADMIN — ATORVASTATIN CALCIUM SCH MG: 10 TABLET, FILM COATED ORAL at 20:39

## 2019-06-12 RX ADMIN — IPRATROPIUM BROMIDE AND ALBUTEROL SULFATE SCH ML: .5; 3 SOLUTION RESPIRATORY (INHALATION) at 07:50

## 2019-06-12 RX ADMIN — INSULIN ASPART SCH UNIT: 100 INJECTION, SOLUTION INTRAVENOUS; SUBCUTANEOUS at 20:39

## 2019-06-12 RX ADMIN — Medication SCH MG: at 20:39

## 2019-06-12 RX ADMIN — CYANOCOBALAMIN TAB 500 MCG SCH MCG: 500 TAB at 09:27

## 2019-06-12 RX ADMIN — Medication SCH UNIT: at 09:25

## 2019-06-12 RX ADMIN — MEMANTINE HYDROCHLORIDE SCH MG: 10 TABLET ORAL at 20:39

## 2019-06-12 RX ADMIN — MELOXICAM SCH MG: 7.5 TABLET ORAL at 09:25

## 2019-06-12 RX ADMIN — INSULIN ASPART SCH: 100 INJECTION, SOLUTION INTRAVENOUS; SUBCUTANEOUS at 12:51

## 2019-06-12 RX ADMIN — DONEPEZIL HYDROCHLORIDE SCH MG: 10 TABLET ORAL at 20:39

## 2019-06-12 RX ADMIN — BUDESONIDE SCH MG: 1 SUSPENSION RESPIRATORY (INHALATION) at 07:50

## 2019-06-12 RX ADMIN — IPRATROPIUM BROMIDE AND ALBUTEROL SULFATE SCH ML: .5; 3 SOLUTION RESPIRATORY (INHALATION) at 16:54

## 2019-06-12 RX ADMIN — ATENOLOL SCH MG: 25 TABLET ORAL at 09:26

## 2019-06-12 RX ADMIN — MEMANTINE HYDROCHLORIDE SCH MG: 10 TABLET ORAL at 09:24

## 2019-06-12 RX ADMIN — FAMOTIDINE SCH MG: 20 TABLET, FILM COATED ORAL at 20:39

## 2019-06-12 RX ADMIN — INSULIN ASPART SCH: 100 INJECTION, SOLUTION INTRAVENOUS; SUBCUTANEOUS at 07:26

## 2019-06-12 RX ADMIN — ENOXAPARIN SODIUM SCH MG: 40 INJECTION SUBCUTANEOUS at 09:24

## 2019-06-12 RX ADMIN — IPRATROPIUM BROMIDE AND ALBUTEROL SULFATE SCH ML: .5; 3 SOLUTION RESPIRATORY (INHALATION) at 21:10

## 2019-06-12 RX ADMIN — AZITHROMYCIN SCH MG: 500 TABLET, FILM COATED ORAL at 20:39

## 2019-06-12 RX ADMIN — IPRATROPIUM BROMIDE AND ALBUTEROL SULFATE SCH ML: .5; 3 SOLUTION RESPIRATORY (INHALATION) at 11:33

## 2019-06-12 RX ADMIN — BUDESONIDE SCH MG: 1 SUSPENSION RESPIRATORY (INHALATION) at 21:10

## 2019-06-12 RX ADMIN — LISINOPRIL SCH MG: 2.5 TABLET ORAL at 20:39

## 2019-06-12 RX ADMIN — THERA TABS SCH EACH: TAB at 09:25

## 2019-06-12 RX ADMIN — INSULIN ASPART SCH UNIT: 100 INJECTION, SOLUTION INTRAVENOUS; SUBCUTANEOUS at 17:46

## 2019-06-12 RX ADMIN — MONTELUKAST SODIUM SCH MG: 10 TABLET, FILM COATED ORAL at 20:39

## 2019-06-12 RX ADMIN — ASPIRIN 81 MG CHEWABLE TABLET SCH MG: 81 TABLET CHEWABLE at 09:25

## 2019-06-12 NOTE — P.PN
Subjective


Progress Note Date: 06/11/19





This is 79 years old male with past medical history significant for coronary 

artery disease presents to the emergency department with worsening shortness of 

breath.  Patient stated that he's been declining with his functional status for 

the last 2 month but about week ago he started developing productive cough for a

green/brown phlegm with worsening in his clinical condition where patient was 

not able to do his normal daily activity by walking out of this house and taking

care of things that he does on normal basis.  Patient denied any fever or chills

but admitted feeding week and tired with decreased oral appetite but patient was

not able to come into the hospital as he has sick wife at home that he needs to 

take care of it but yesterday patient was extremely short of breath and fatigue 

and determent to come into the emergency department.  Patient the stated last 

time he was in the hospital a few years ago denied being in contact of sick 

people except for his wife.  Patient denied tobacco alcohol or drug abuse.  

Patient was seen in the emergency department with computed tomography scan of 

the chest which was positive for left lower lobe infiltrate was started on 

antibiotics and admitted to the medical floor.  Patient denied any recent weight

loss and stated that his weight usually around 210.  Patient denied any recent 

upper respiratory symptoms other with T mentioned above.  Patient denied any 

night sweats or low-grade fever








6/10: The patient has been seen by Dr. Mack and plan to continue same 

medications.  He is doubtful that there is any pneumonia and feels is more 

likely atelectasis or minimal inflammatory response.  Patient has been afebrile,

heart rate 96, blood pressure 160/89, pulse ox 97% on room air.  Blood sugars 

are running 115 to 126.  Sputum culture in progress.  Troponins of been negative

on 3 draws.





6/11: Blood sugars are between 95 and 203.  Patient states he needs a new 

glucometer for home and  will make arrangements.  He has been afebri

le, heart rate 70, pulse ox 96% on room air, blood pressure 137/67.  Dr. Echavarria and may consider bronchoscopy if no improvement clinically from 

pneumonia and COPD exacerbation.  Rocephin has been added to Zithromax. Repeat 

chest x-ray reveals tree-in-bud opacities are better seen in CT indicative of 

reactive or infectious pneumonitis.  However, there remains mild peribronchial 

cuffing in the superior segment of the left lower lobe on the lateral view only.

 Patient continues to have wheezing and Symbicort changed to Pulmicort.  Sputum 

culture is in progress.  Patient states that he continues to have a little 

shortness of breath.  He denies any fever or chills.  He states he could not 

sleep last night due to noises on the floor.  Melatonin added.








Objective





- Vital Signs


Vital signs: 


                                   Vital Signs











Temp  97.9 F   06/10/19 19:35


 


Pulse  72   06/11/19 08:27


 


Resp  16   06/10/19 23:32


 


BP  137/67   06/10/19 19:35


 


Pulse Ox  96   06/11/19 08:16








                                 Intake & Output











 06/10/19 06/11/19 06/11/19





 18:59 06:59 18:59


 


Intake Total 800 50 


 


Output Total  500 


 


Balance 800 -450 


 


Intake:   


 


  Intake, IV Titration 300 50 





  Amount   


 


    Azithromycin 500 mg In 250  





    Sodium Chloride 0.9% 250   





    ml @ 250 mls/hr IVPB Q24H   





    [facility] Rx#:[id number]   


 


    cefTRIAXone 1 gm In 50 50 





    Sodium Chloride 0.9% 50   





    ml @ 100 mls/hr IVPB   





    Q24HR [facility] Rx#:[id number]   


 


  Oral 500  


 


Output:   


 


  Urine  500 


 


Other:   


 


  Voiding Method  Toilet 


 


  # Voids  2 














- Exam





Review Of Systems:


Constitutional: No fever, no chills, no night sweats.  No weight change.  No 

weakness, fatigue or lethargy.  No daytime sleepiness.


EENT: No headache.  No blurred vision or double vision, no loss of vision.  No 

loss of Hearing, no ringing in the ears, no dizziness.  No nasal drainage or 

congestion.  No epistaxis.  No sore throat.


Lungs: Reports shortness of breath, reports cough, no sputum production.  

Reports wheezing.


Cardiovascular: No chest pain, no lower extremity edema.  No palpitations.  No 

paroxysmal nocturnal dyspnea.  No orthopnea.  No lightheadedness or dizziness.  

No syncopal episodes.


Abdominal: No abdominal pain.  No nausea, vomiting.  No diarrhea.  No 

constipation.  No bloody or tarry stools..  No loss of appetite.


Genitourinary: No dysuria, increased frequency, urgency.  No urinary retention.


Musculoskeletal: No myalgias.  No muscle weakness, no gait dysfunction, no 

frequent falls.  No back pain.  No neck pain.


Integumentary: No wounds, no lesions.  No rash or pruritus.  No unusual 

bruising.  No change in hair or nails.


Neurologic: No aphasia. No facial droop. No change in mentation. No head injury.

No headache. No paralysis. No paresthesia.


Psychiatric: No depression.  No anxiety.  No mood swings.  Reports insomnia


Endocrine: Reports abnormal blood sugars.  No weight change.  No excessive 

sweating or thirst.  No cold intolerance.  














Gen: This is a 79-year-old  male.  No acute respiratory distress.


HEENT: Head is atraumatic, normocephalic. Pupils equal, round. Sclerae is 

anicteric.  Patient very hard of hearing.


NECK: Supple. No JVD. No lymphadenopathy. No thyromegaly. 


LUNGS: Coarse breath sounds throughout with scattered expiratory wheeze.  No 

intercostal retractions.


HEART: Regular rate and rhythm. No murmur. 


ABDOMEN: Soft. Bowel sounds are present. No masses.  No tenderness.


EXTREMITIES: No pedal edema.  No calf tenderness.


NEUROLOGICAL: Patient is awake, alert and oriented x3. Cranial nerves 2 through 

12 are grossly intact. 








- Labs


CBC & Chem 7: 


                                 06/08/19 20:33





                                 06/08/19 20:33


Labs: 


                  Abnormal Lab Results - Last 24 Hours (Table)











  06/08/19 06/10/19 06/10/19 Range/Units





  20:33 11:52 17:03 


 


POC Glucose (mg/dL)   119 H  115 H  (75-99)  mg/dL


 


Hemoglobin A1c  6.1 H    (4.0-6.0)  %














  06/10/19 Range/Units





  20:33 


 


POC Glucose (mg/dL)  203 H  (75-99)  mg/dL


 


Hemoglobin A1c   (4.0-6.0)  %














Assessment and Plan


Plan: 





1.  Shortness of breath secondary to mild COPD exacerbation.  Consult with Dr. Italia guillermo.  Continue DuoNeb treatments, Symbicort changed to Pulmicor

t, oral prednisone 40 mg daily.


2.  Left lower lobe pneumonia.  Continue azithromycin and ceftriaxone.


3.  Hypertension.Continue lisinopril, atenolol


4.  Hyperlipidemia.  Continue Lipitor.


5.  Diabetes mellitus type 2.  Continue metformin, NovoLog scale.


6.  History of myocardial infarction and coronary artery disease.  Continue 

aspirin 81 mg daily, Lipitor 10 mg daily.


7.  GERD


8.  Mild dementia.  Continue Aricept and Namenda.  


9.  Migraines.


10.  History of pulmonary embolism.


11.  Obstructive sleep apnea on CPAP.


12.  History of DVT.





Discharge plan: Home





Impression and plan of care have been directed as dictated by the signing 

physician.  Sharon Walker nurse practitioner acting as scribe for signing 

physician.

## 2019-06-12 NOTE — P.PN
Subjective


Progress Note Date: 06/12/19


Principal diagnosis: 





Acute left lower lobe pneumonia.





The patient is seen today 06/12/2019 in follow-up on the regular medical floor. 

He is currently sitting up at the bedside.  Awake and alert in no acute 

distress.  Maintaining O2 saturations in the 90s on room air.  Blood culture 

reveals no growth.  He remains on bronchodilators, Pulmicort inhalations, 

ceftriaxone and azithromycin, oral prednisone.  Continues with a loose 

nonproductive cough.  Improved today compared to yesterday.





Objective





- Vital Signs


Vital signs: 


                                   Vital Signs











Temp  98 F   06/12/19 07:00


 


Pulse  75   06/12/19 11:42


 


Resp  12   06/12/19 07:00


 


BP  117/66   06/12/19 07:00


 


Pulse Ox  93 L  06/12/19 07:00








                                 Intake & Output











 06/11/19 06/12/19 06/12/19





 18:59 06:59 18:59


 


Intake Total 850 940 


 


Balance 850 940 


 


Intake:   


 


    


 


    Sodium Chloride 0.9% 1, 800  





    000 ml @ 100 mls/hr IV .   





    Q10H [facility] Rx#:[id number]   


 


    cefTRIAXone 1 gm In 50  





    Sodium Chloride 0.9% 50   





    ml @ 100 mls/hr IVPB   





    Q24HR [facility] Rx#:[id number]   


 


  Oral  940 


 


Other:   


 


  # Voids  2 














- Exam





GENERAL EXAM: Alert, active, comfortable in no apparent distress.  On room air.


HEAD: Normocephalic.


EYES: Normal reaction of pupils, equal size.


NOSE: Clear with pink turbinates.


THROAT: No erythema or exudates.


NECK: No masses, no JVD.


CHEST: No chest wall deformity.


LUNGS: Equal air entry with scattered rhonchi, crackles in left posterior base


CVS: S1 and S2 normal with no audible murmur, regular rhythm.


ABDOMEN: No hepatosplenomegaly, normal bowel sounds, no guarding or rigidity.


SPINE: No scoliosis or deformity


SKIN: No rashes


CENTRAL NERVOUS SYSTEM: No focal deficits, tone is normal in all 4 extremities.


EXTREMITIES: There is no peripheral edema.  No clubbing, no cyanosis.  

Peripheral pulses are intact.





- Labs


CBC & Chem 7: 


                                 06/08/19 20:33





                                 06/08/19 20:33


Labs: 


                  Abnormal Lab Results - Last 24 Hours (Table)











  06/11/19 06/11/19 06/12/19 Range/Units





  17:00 20:21 06:59 


 


POC Glucose (mg/dL)  179 H  139 H  101 H  (75-99)  mg/dL














  06/12/19 Range/Units





  12:00 


 


POC Glucose (mg/dL)  103 H  (75-99)  mg/dL








                      Microbiology - Last 24 Hours (Table)











 06/09/19 12:31 Gram Stain - Final





 Sputum Sputum Culture - Final














Assessment and Plan


Assessment: 





Impression:





1 acute left lower lobe pneumonia currently on Zithromax.  Awaiting sputum Gram 

stain and culture.





2 acute COPD exacerbation most likely secondary to underlying left lower lobe 

pneumonia.





3 shortness of breath secondary to above, improving





For dementia





5 hypertension





6 hyperlipidemia





7 diabetes mellitus type 2





8 coronary artery disease with previous MI





9 migraines





10 remote history of pulmonary embolism currently on no anticoagulants





11 history of obstructive sleep apnea maintained on CPAP on outpatient basis





Plan:





The patient was seen and evaluated by Dr. Echavarria.  We'll continue with the 

current treatment plan.  Increase his activity as tolerated.  Repeat chest x-ray

in a.m.  We'll continue to follow.





I, the cosigning physician, performed a history & physical examination of the 

patient. Lungs sounds with few scattered rhonchi, crackles in left posterior 

base. Maintaining good O2 saturations in the 90s on room air.  I discussed the 

assessment and plan of care with my nurse practitioner, Gege Kaye. I attest to 

the above note as dictated by her.

## 2019-06-13 VITALS — HEART RATE: 68 BPM

## 2019-06-13 VITALS — RESPIRATION RATE: 16 BRPM | DIASTOLIC BLOOD PRESSURE: 83 MMHG | TEMPERATURE: 97.7 F | SYSTOLIC BLOOD PRESSURE: 144 MMHG

## 2019-06-13 LAB
ALBUMIN SERPL-MCNC: 3.6 G/DL (ref 3.5–5)
ALP SERPL-CCNC: 51 U/L (ref 38–126)
ALT SERPL-CCNC: 55 U/L (ref 21–72)
ANION GAP SERPL CALC-SCNC: 7 MMOL/L
AST SERPL-CCNC: 38 U/L (ref 17–59)
BUN SERPL-SCNC: 19 MG/DL (ref 9–20)
CALCIUM SPEC-MCNC: 9.3 MG/DL (ref 8.4–10.2)
CHLORIDE SERPL-SCNC: 109 MMOL/L (ref 98–107)
CO2 SERPL-SCNC: 26 MMOL/L (ref 22–30)
ERYTHROCYTE [DISTWIDTH] IN BLOOD BY AUTOMATED COUNT: 4.74 M/UL (ref 4.3–5.9)
ERYTHROCYTE [DISTWIDTH] IN BLOOD: 13.3 % (ref 11.5–15.5)
GLUCOSE BLD-MCNC: 119 MG/DL (ref 75–99)
GLUCOSE BLD-MCNC: 94 MG/DL (ref 75–99)
GLUCOSE SERPL-MCNC: 103 MG/DL (ref 74–99)
HCT VFR BLD AUTO: 42.4 % (ref 39–53)
HGB BLD-MCNC: 13.7 GM/DL (ref 13–17.5)
MCH RBC QN AUTO: 28.9 PG (ref 25–35)
MCHC RBC AUTO-ENTMCNC: 32.3 G/DL (ref 31–37)
MCV RBC AUTO: 89.5 FL (ref 80–100)
PLATELET # BLD AUTO: 199 K/UL (ref 150–450)
POTASSIUM SERPL-SCNC: 3.9 MMOL/L (ref 3.5–5.1)
PROT SERPL-MCNC: 6 G/DL (ref 6.3–8.2)
SODIUM SERPL-SCNC: 142 MMOL/L (ref 137–145)
WBC # BLD AUTO: 10.7 K/UL (ref 3.8–10.6)

## 2019-06-13 RX ADMIN — Medication SCH UNIT: at 08:35

## 2019-06-13 RX ADMIN — BUDESONIDE SCH MG: 1 SUSPENSION RESPIRATORY (INHALATION) at 07:38

## 2019-06-13 RX ADMIN — MELOXICAM SCH MG: 7.5 TABLET ORAL at 08:36

## 2019-06-13 RX ADMIN — ASPIRIN 81 MG CHEWABLE TABLET SCH MG: 81 TABLET CHEWABLE at 08:36

## 2019-06-13 RX ADMIN — INSULIN ASPART SCH: 100 INJECTION, SOLUTION INTRAVENOUS; SUBCUTANEOUS at 11:43

## 2019-06-13 RX ADMIN — CYANOCOBALAMIN TAB 500 MCG SCH MCG: 500 TAB at 08:35

## 2019-06-13 RX ADMIN — ENOXAPARIN SODIUM SCH MG: 40 INJECTION SUBCUTANEOUS at 08:34

## 2019-06-13 RX ADMIN — MEMANTINE HYDROCHLORIDE SCH MG: 10 TABLET ORAL at 08:35

## 2019-06-13 RX ADMIN — THERA TABS SCH EACH: TAB at 08:36

## 2019-06-13 RX ADMIN — FAMOTIDINE SCH MG: 20 TABLET, FILM COATED ORAL at 08:36

## 2019-06-13 RX ADMIN — ATENOLOL SCH MG: 25 TABLET ORAL at 08:35

## 2019-06-13 RX ADMIN — IPRATROPIUM BROMIDE AND ALBUTEROL SULFATE SCH ML: .5; 3 SOLUTION RESPIRATORY (INHALATION) at 07:38

## 2019-06-13 RX ADMIN — IPRATROPIUM BROMIDE AND ALBUTEROL SULFATE SCH ML: .5; 3 SOLUTION RESPIRATORY (INHALATION) at 11:28

## 2019-06-13 RX ADMIN — INSULIN ASPART SCH: 100 INJECTION, SOLUTION INTRAVENOUS; SUBCUTANEOUS at 08:29

## 2019-06-13 NOTE — P.DS
Providers


Date of admission: 


06/08/19 23:03





Expected date of discharge: 06/13/19


Attending physician: 


Elisa Cobos





Consults: 





                                        





06/08/19 22:53


Consult Physician Stat 


   Consulting Provider: Peng Mack Reason/Comments: COPD exacerbation, pneumonia, chest pian


   Do you want consulting provider notified?: Yes











Primary care physician: 


[facility] Course: 


This is 79 years old male with past medical history significant for coronary 

artery disease presents to the emergency department with worsening shortness of 

breath.  Patient stated that he's been declining with his functional status for 

the last 2 month but about week ago he started developing productive cough for a

green/brown phlegm with worsening in his clinical condition where patient was 

not able to do his normal daily activity by walking out of this house and taking

care of things that he does on normal basis.  Patient denied any fever or chills

but admitted feeding week and tired with decreased oral appetite but patient was

not able to come into the hospital as he has sick wife at home that he needs to 

take care of it but yesterday patient was extremely short of breath and fatigue 

and determent to come into the emergency department.  Patient the stated last 

time he was in the hospital a few years ago denied being in contact of sick 

people except for his wife.  Patient denied tobacco alcohol or drug abuse.  

Patient was seen in the emergency department with computed tomography scan of 

the chest which was positive for left lower lobe infiltrate was started on 

antibiotics and admitted to the medical floor.  Patient denied any recent weight

loss and stated that his weight usually around 210.  Patient denied any recent 

upper respiratory symptoms other with T mentioned above.  Patient denied any 

night sweats or low-grade fever








6/10: The patient has been seen by Dr. Mack and plan to continue same 

medications.  He is doubtful that there is any pneumonia and feels is more 

likely atelectasis or minimal inflammatory response.  Patient has been afebrile,

heart rate 96, blood pressure 160/89, pulse ox 97% on room air.  Blood sugars 

are running 115 to 126.  Sputum culture in progress.  Troponins of been negative

on 3 draws.





6/11: Blood sugars are between 95 and 203.  Patient states he needs a new 

glucometer for home and  will make arrangements.  He has been 

afebrile, heart rate 70, pulse ox 96% on room air, blood pressure 137/67.  Dr. Echavarria and may consider bronchoscopy if no improvement clinically from 

pneumonia and COPD exacerbation.  Rocephin has been added to Zithromax. Repeat 

chest x-ray reveals tree-in-bud opacities are better seen in CT indicative of 

reactive or infectious pneumonitis.  However, there remains mild peribronchial 

cuffing in the superior segment of the left lower lobe on the lateral view only.

 Patient continues to have wheezing and Symbicort changed to Pulmicort.  Sputum 

culture is in progress.  Patient states that he continues to have a little 

shortness of breath.  He denies any fever or chills.  He states he could not 

sleep last night due to noises on the floor.  Melatonin added.





6/12: Patient remains afebrile, heart rate 86, blood pressure 117/66, pulse ox 

93% on room air.  Blood sugars are running between 101 and 179.  Patient 

complains of cough with white sputum production.  He has a sputum culture that 

has been finalized with normal growth.  We will add in Solu-Medrol 1 time dose 

and continue prednisone 40 mg daily.  No signs of thrush.  Patient is continued 

on nebulizer treatments ceftriaxone and azithromycin as well.  Repeat chest x-

ray has been ordered for the morning by pulmonary medicine.





6/13: Patient remains afebrile, heart rate 89, blood pressure 144/83, pulse ox 

96% on room air.  Blood sugars are running between 94 and 169. Repeat lab work 

reveals WBC 10.7, creatinine 0.82.  Breathing status is stable.  Patient still 

has a cough sometimes productive.  Patient will be discharged home today in 

stable condition.








Discharge diagnoses:


1.  Shortness of breath secondary to mild COPD exacerbation.


2.  Left lower lobe pneumonia, community-acquired pneumonia doubtful per pulm

onary medicine.  Most likely atelectasis and/or minimal inflammatory response.


3.  Hypertension.


4.  Hyperlipidemia.


5.  Diabetes mellitus type 2 non-insulin-dependent.


6.  History of myocardial infarction and coronary artery disease.


7.  GERD


8.  Mild dementia


9.  Migraines.


10.  History of pulmonary embolism.


11.  Obstructive sleep apnea on CPAP.


12.  History of DVT.





Discharge plan: Home





Impression and plan of care have been directed as dictated by the signing 

physician.  Sharon Walker nurse practitioner acting as scribe for signing 

physician.





Patient Condition at Discharge: Good





Plan - Discharge Summary


New Discharge Prescriptions: 


New


   Ipratropium-Albuterol Nebulize [Duoneb 0.5 mg-3 mg/3 ml Soln] 3 ml INHALATION

RT-QID #120 ampul.neb


   predniSONE 0 mg PO AS DIRECTED #45 tab


   Budesonide [Pulmicort] 1 mg INHALATION RT-BID #60 nebu


   Azithromycin [Zithromax] 500 mg PO HS #5 tab





Continue


   metFORMIN HCL [Glucophage] 500 mg PO BID


   Lisinopril [Zestril] 2.5 mg PO HS


   Famotidine 40 mg PO BID


   Atenolol [Tenormin] 25 mg PO QAM


   Aspirin 81 mg PO QAM


   Albuterol Inhaler [Ventolin Hfa Inhaler] 1 puff INHALATION RT-Q6H PRN


     PRN Reason: Shortness Of Breath


   Cyanocobalamin [Vitamin B-12] 250 mcg PO DAILY


   Lovastatin [Mevacor] 40 mg PO HS


   Multivitamins, Thera [Multivitamin (formulary)] 1 tab PO DAILY


   Montelukast [Singulair] 10 mg PO HS


   Cholecalciferol [Vitamin D3 (25 Mcg = 1000 Iu)] 1,000 unit PO DAILY


   Meloxicam 15 mg PO DAILY


   Donepezil [Aricept] 10 mg PO HS


   Memantine [Namenda] 10 mg PO BID


   guaiFENesin [Mucinex] 600 mg PO BID


   traZODone HCL 50 mg PO HS


   Butalbital/Acetaminophen [Butalbital/Acetaminophen 50-300mg] 1 - 2 cap PO Q4H

PRN


     PRN Reason: Migraine Headache





Discontinued


   predniSONE See Taper PO AS DIRECTED


Discharge Medication List





Atenolol [Tenormin] 25 mg PO QAM 09/30/14 [History]


Famotidine 40 mg PO BID 09/30/14 [History]


Lisinopril [Zestril] 2.5 mg PO HS 09/30/14 [History]


metFORMIN HCL [Glucophage] 500 mg PO BID 09/30/14 [History]


Aspirin 81 mg PO QAM 03/25/16 [History]


Albuterol Inhaler [Ventolin Hfa Inhaler] 1 puff INHALATION RT-Q6H PRN 07/18/16 

[History]


Cyanocobalamin [Vitamin B-12] 250 mcg PO DAILY 07/18/16 [History]


Lovastatin [Mevacor] 40 mg PO HS 09/19/16 [History]


Cholecalciferol [Vitamin D3 (25 Mcg = 1000 Iu)] 1,000 unit PO DAILY 12/14/17 

[History]


Meloxicam 15 mg PO DAILY 12/14/17 [History]


Montelukast [Singulair] 10 mg PO HS 12/14/17 [History]


Multivitamins, Thera [Multivitamin (formulary)] 1 tab PO DAILY 12/14/17 

[History]


Donepezil [Aricept] 10 mg PO HS 07/22/18 [History]


Memantine [Namenda] 10 mg PO BID 07/22/18 [History]


Butalbital/Acetaminophen [Butalbital/Acetaminophen 50-300mg] 1 - 2 cap PO Q4H 

PRN 06/08/19 [History]


guaiFENesin [Mucinex] 600 mg PO BID 06/08/19 [History]


traZODone HCL 50 mg PO HS 06/08/19 [History]


Azithromycin [Zithromax] 500 mg PO HS #5 tab 06/13/19 [Rx]


Budesonide [Pulmicort] 1 mg INHALATION RT-BID #60 nebu 06/13/19 [Rx]


Ipratropium-Albuterol Nebulize [Duoneb 0.5 mg-3 mg/3 ml Soln] 3 ml INHALATION 

RT-QID #120 ampul.neb 06/13/19 [Rx]


predniSONE 0 mg PO AS DIRECTED #45 tab 06/13/19 [Rx]








Follow up Appointment(s)/Referral(s): 


Elisa Cobos MD [Primary Care Provider] - 1 Week


Magda Echavarria MD [STAFF PHYSICIAN] - 1 Week


Discharge Disposition: HOME SELF-CARE

## 2019-06-13 NOTE — P.PN
Subjective


Progress Note Date: 06/12/19





This is 79 years old male with past medical history significant for coronary 

artery disease presents to the emergency department with worsening shortness of 

breath.  Patient stated that he's been declining with his functional status for 

the last 2 month but about week ago he started developing productive cough for a

green/brown phlegm with worsening in his clinical condition where patient was 

not able to do his normal daily activity by walking out of this house and taking

care of things that he does on normal basis.  Patient denied any fever or chills

but admitted feeding week and tired with decreased oral appetite but patient was

not able to come into the hospital as he has sick wife at home that he needs to 

take care of it but yesterday patient was extremely short of breath and fatigue 

and determent to come into the emergency department.  Patient the stated last 

time he was in the hospital a few years ago denied being in contact of sick 

people except for his wife.  Patient denied tobacco alcohol or drug abuse.  

Patient was seen in the emergency department with computed tomography scan of 

the chest which was positive for left lower lobe infiltrate was started on 

antibiotics and admitted to the medical floor.  Patient denied any recent weight

loss and stated that his weight usually around 210.  Patient denied any recent 

upper respiratory symptoms other with T mentioned above.  Patient denied any 

night sweats or low-grade fever








6/10: The patient has been seen by Dr. Mack and plan to continue same 

medications.  He is doubtful that there is any pneumonia and feels is more 

likely atelectasis or minimal inflammatory response.  Patient has been afebrile,

heart rate 96, blood pressure 160/89, pulse ox 97% on room air.  Blood sugars 

are running 115 to 126.  Sputum culture in progress.  Troponins of been negative

on 3 draws.





6/11: Blood sugars are between 95 and 203.  Patient states he needs a new 

glucometer for home and  will make arrangements.  He has been afebri

le, heart rate 70, pulse ox 96% on room air, blood pressure 137/67.  Dr. Echavarria and may consider bronchoscopy if no improvement clinically from 

pneumonia and COPD exacerbation.  Rocephin has been added to Zithromax. Repeat 

chest x-ray reveals tree-in-bud opacities are better seen in CT indicative of 

reactive or infectious pneumonitis.  However, there remains mild peribronchial 

cuffing in the superior segment of the left lower lobe on the lateral view only.

 Patient continues to have wheezing and Symbicort changed to Pulmicort.  Sputum 

culture is in progress.  Patient states that he continues to have a little 

shortness of breath.  He denies any fever or chills.  He states he could not 

sleep last night due to noises on the floor.  Melatonin added.





6/12: Patient remains afebrile, heart rate 86, blood pressure 117/66, pulse ox 

93% on room air.  Blood sugars are running between 101 and 179.  Patient 

complains of cough with white sputum production.  He has a sputum culture that 

has been finalized with normal growth.  We will add in Solu-Medrol 1 time dose 

and continue prednisone 40 mg daily.  No signs of thrush.  Patient is continued 

on nebulizer treatments ceftriaxone and azithromycin as well.  Repeat chest x-

ray has been ordered for the morning by pulmonary medicine.





Objective





- Vital Signs


Vital signs: 


                                   Vital Signs











Temp  98 F   06/12/19 07:00


 


Pulse  86   06/12/19 08:09


 


Resp  12   06/12/19 07:00


 


BP  117/66   06/12/19 07:00


 


Pulse Ox  93 L  06/12/19 07:00








                                 Intake & Output











 06/11/19 06/12/19 06/12/19





 18:59 06:59 18:59


 


Intake Total 850 940 


 


Balance 850 940 


 


Intake:   


 


    


 


    Sodium Chloride 0.9% 1, 800  





    000 ml @ 100 mls/hr IV .   





    [facility][id number]   


 


    cefTRIAXone 1 gm In 50  





    Sodium Chloride 0.9% 50   





    ml @ 100 mls/hr IVPB   





    Q[facility]HR [facility] Rx#:[id number]   


 


  Oral  940 


 


Other:   


 


  # Voids  2 














- Exam





Review Of Systems:


Constitutional: No fever, no chills, no night sweats.  No weight change.  No 

weakness, fatigue or lethargy.  No daytime sleepiness.


EENT: No headache.  No blurred vision or double vision, no loss of vision.  No 

loss of Hearing, no ringing in the ears, no dizziness.  No nasal drainage or 

congestion.  No epistaxis.  No sore throat.


Lungs: Reports shortness of breath, reports cough, reports sputum production.  

Reports wheezing.


Cardiovascular: No chest pain, no lower extremity edema.  No palpitations.  No 

paroxysmal nocturnal dyspnea.  No orthopnea.  No lightheadedness or dizziness.  

No syncopal episodes.


Abdominal: No abdominal pain.  No nausea, vomiting.  No diarrhea.  No con

stipation.  No bloody or tarry stools..  No loss of appetite.


Genitourinary: No dysuria, increased frequency, urgency.  No urinary retention.


Musculoskeletal: No myalgias.  No muscle weakness, no gait dysfunction, no 

frequent falls.  No back pain.  No neck pain.


Integumentary: No wounds, no lesions.  No rash or pruritus.  No unusual 

bruising.  No change in hair or nails.


Neurologic: No aphasia. No facial droop. No change in mentation. No head injury.

No headache. No paralysis. No paresthesia.


Psychiatric: No depression.  No anxiety.  No mood swings.  Reports insomnia


Endocrine: Reports abnormal blood sugars.  No weight change.  No excessive 

sweating or thirst.  No cold intolerance.  














Gen: This is a 79-year-old  male.  No acute respiratory distress.  

Patient resting in bed.


HEENT: Head is atraumatic, normocephalic. Pupils equal, round. Sclerae is 

anicteric.  Patient very hard of hearing.


NECK: Supple. No JVD. No lymphadenopathy. No thyromegaly. 


LUNGS: Coarse breath sounds throughout with scattered expiratory wheeze.  No 

intercostal retractions.


HEART: Regular rate and rhythm. No murmur. 


ABDOMEN: Soft. Bowel sounds are present. No masses.  No tenderness.


EXTREMITIES: No pedal edema.  No calf tenderness.


NEUROLOGICAL: Patient is awake, alert and oriented x3. Cranial nerves 2 through 

12 are grossly intact. 








- Labs


CBC & Chem 7: 


                                 06/08/19 20:33





                                 06/08/19 20:33


Labs: 


                  Abnormal Lab Results - Last 24 Hours (Table)











  06/11/19 06/11/19 06/11/19 Range/Units





  11:57 17:00 20:21 


 


POC Glucose (mg/dL)  116 H  179 H  139 H  (75-99)  mg/dL














  06/12/19 Range/Units





  06:59 


 


POC Glucose (mg/dL)  101 H  (75-99)  mg/dL








                      Microbiology - Last 24 Hours (Table)











 06/09/19 12:31 Gram Stain - Final





 Sputum Sputum Culture - Final














Assessment and Plan


Plan: 





1.  Shortness of breath secondary to mild COPD exacerbation.  Consult with Dr. Italia guillermo.  Continue DuoNeb treatments, Symbicort changed to 

Pulmicort, oral prednisone 40 mg daily.  One dose of Cytomel Medrol IV ordered.


2.  Left lower lobe pneumonia.  Continue azithromycin and ceftriaxone.


3.  Hypertension.Continue lisinopril, atenolol


4.  Hyperlipidemia.  Continue Lipitor.


5.  Diabetes mellitus type 2.  Continue metformin, NovoLog scale.


6.  History of myocardial infarction and coronary artery disease.  Continue 

aspirin 81 mg daily, Lipitor 10 mg daily.


7.  GERD


8.  Mild dementia.  Continue Aricept and Namenda.  


9.  Migraines.


10.  History of pulmonary embolism.


11.  Obstructive sleep apnea on CPAP.


12.  History of DVT.





Discharge plan: Home





Impression and plan of care have been directed as dictated by the signing 

physician.  Sharon Walker nurse practitioner acting as scribe for signing 

physician.

## 2019-06-13 NOTE — P.PN
Subjective


Progress Note Date: 06/13/19


Principal diagnosis: 


Acute left lower lobe pneumonia





The patient is seen today 06/12/2019 in follow-up on the regular medical floor. 

He is currently sitting up at the bedside.  Awake and alert in no acute 

distress.  Maintaining O2 saturations in the 90s on room air.  Blood culture 

reveals no growth.  He remains on bronchodilators, Pulmicort inhalations, 

ceftriaxone and azithromycin, oral prednisone.  Continues with a loose 

nonproductive cough.  Improved today compared to yesterday.





On 06/13/2017 patient seen in follow-up on medical surgical floor.  Awake and 

alert, in no acute distress, room air pulse ox is 96%.  No fever or chills, 

hemodynamically stable, no acute events overnight, no significant chest 

congestion, no cough with some chest pain, no fever or chills.  Sputum culture 

showed no growth.  His labs have been reviewed, showing white blood cell count 

of 10.7, hemoglobin 13.7, serum sodium of 142, potassium 3.9, chloride is 109, 

CO2 is 26, BUN is 19, creatinine 0.82.  Antibiotic coverage in the form of 

Zithromax and Rocephin, nebulized bronchodilators, oral prednisone. 








Objective





- Vital Signs


Vital signs: 


                                   Vital Signs











Temp  97.7 F   06/13/19 14:00


 


Pulse  68   06/13/19 14:00


 


Resp  16   06/13/19 14:00


 


BP  144/83   06/13/19 14:00


 


Pulse Ox  97   06/13/19 14:00








                                 Intake & Output











 06/12/19 06/13/19 06/13/19





 18:59 06:59 18:59


 


Intake Total 550 880 


 


Balance 550 880 


 


Intake:   


 


  Intake, IV Titration 50  





  Amount   


 


    cefTRIAXone 1 gm In 50  





    Sodium Chloride 0.9% 50   





    ml @ 100 mls/hr IVPB   





    Q24HR Pending sale to [facility] Rx#:[id number]   


 


  Oral 500 880 


 


Other:   


 


  Voiding Method   Toilet


 


  # Voids  1 1














- Exam


 GENERAL EXAM: Alert, pleasant, 71-year-old white male, on room air, with a 

pulse ox of 96%, comfortable in no apparent distress.


HEAD: Normocephalic/atraumatic.


EYES: Normal reaction of pupils, equal size.  Conjunctiva pink, sclera white.


NOSE: Clear with pink turbinates.


THROAT: No erythema or exudates.


NECK: No masses, no JVD, no thyroid enlargement, no adenopathy.


CHEST: No chest wall deformity.  Symmetrical expansion. 


LUNGS: Equal air entry with diminished breath sounds, patient has a strong 

effective cough


CVS: Regular rate and rhythm, normal S1 and S2, no gallops, no murmurs, no rubs


ABDOMEN: Soft, nontender.  No hepatosplenomegaly, normal bowel sounds, no 

guarding or rigidity.


EXTREMITIES: No clubbing, no edema, no cyanosis, 2+ pulses and upper and lower 

extremities.


MUSCULOSKELETAL: Muscle strength and tone normal.


SPINE: No scoliosis or deformity


SKIN: No rashes


CENTRAL NERVOUS SYSTEM: Alert and oriented -3.  No focal deficits, tone is 

normal in all 4 extremities.


PSYCHIATRIC: Alert and oriented -3.  Appropriate affect.  Intact judgment and 

insight.











- Labs


CBC & Chem 7: 


                                 06/13/19 09:02





                                 06/13/19 09:02


Labs: 


                  Abnormal Lab Results - Last 24 Hours (Table)











  06/12/19 06/12/19 06/13/19 Range/Units





  16:58 20:18 09:02 


 


WBC    10.7 H  (3.8-10.6)  k/uL


 


Chloride     ()  mmol/L


 


Glucose     (74-99)  mg/dL


 


POC Glucose (mg/dL)  140 H  169 H   (75-99)  mg/dL


 


Total Protein     (6.3-8.2)  g/dL














  06/13/19 06/13/19 Range/Units





  09:02 11:27 


 


WBC    (3.8-10.6)  k/uL


 


Chloride  109 H   ()  mmol/L


 


Glucose  103 H   (74-99)  mg/dL


 


POC Glucose (mg/dL)   119 H  (75-99)  mg/dL


 


Total Protein  6.0 L   (6.3-8.2)  g/dL














Assessment and Plan


Plan: 


1 acute left lower lobe pneumonia currently on Zithromax.  Awaiting sputum Gram 

stain and culture.





2 acute COPD exacerbation most likely secondary to underlying left lower lobe 

pneumonia.





3 shortness of breath secondary to above, improving





For dementia





5 hypertension





6 hyperlipidemia





7 diabetes mellitus type 2





8 coronary artery disease with previous MI





9 migraines





10 remote history of pulmonary embolism currently on no anticoagulants





11 history of obstructive sleep apnea maintained on CPAP on outpatient basis





Plan:





Patient is doing well, stable, no significant congestion, no place or chest 

pain, fever or chills, no acute events overnight.  He is maintaining stable 

oxygenation on room air.  Sputum culture showed no growth.  From pulmonary 

perspective patient is stable for discharge home today on outpatient course of 

oral antibiotics, prednisone taper. 





I performed a history & physical examination of the patient and discussed their 

management with my nurse practitioner, Alyce Harrison.  I reviewed the nurse 

practitioner's note and agree with the documented findings and plan of care.  

Lung sounds are positive for diminished breath sounds.  The findings and the 

impression was discussed with the patient.  I attest to the documentation by the

nurse practitioner. 





Time with Patient: Less than 30

## 2019-07-15 ENCOUNTER — HOSPITAL ENCOUNTER (OUTPATIENT)
Dept: HOSPITAL 47 - LABWHC1 | Age: 79
Discharge: HOME | End: 2019-07-15
Attending: FAMILY MEDICINE
Payer: COMMERCIAL

## 2019-07-15 DIAGNOSIS — E11.65: ICD-10-CM

## 2019-07-15 DIAGNOSIS — E78.5: ICD-10-CM

## 2019-07-15 DIAGNOSIS — I10: Primary | ICD-10-CM

## 2019-07-15 LAB
ALBUMIN SERPL-MCNC: 4.4 G/DL (ref 3.8–4.9)
ALBUMIN/GLOB SERPL: 2.75 G/DL (ref 1.6–3.17)
ALP SERPL-CCNC: 62 U/L (ref 41–126)
ALT SERPL-CCNC: 48 U/L (ref 10–49)
ANION GAP SERPL CALC-SCNC: 8.2 MMOL/L (ref 4–12)
AST SERPL-CCNC: 40 U/L (ref 14–35)
BASOPHILS # BLD AUTO: 0.1 K/UL (ref 0–0.2)
BASOPHILS NFR BLD AUTO: 1 %
BUN SERPL-SCNC: 15 MG/DL (ref 9–27)
BUN/CREAT SERPL: 13.64 RATIO (ref 12–20)
CALCIUM SPEC-MCNC: 9.5 MG/DL (ref 8.7–10.3)
CHLORIDE SERPL-SCNC: 110 MMOL/L (ref 96–109)
CHOLEST SERPL-MCNC: 129 MG/DL (ref 0–200)
CO2 SERPL-SCNC: 24.8 MMOL/L (ref 21.6–31.8)
EOSINOPHIL # BLD AUTO: 0.2 K/UL (ref 0–0.7)
EOSINOPHIL NFR BLD AUTO: 3 %
ERYTHROCYTE [DISTWIDTH] IN BLOOD BY AUTOMATED COUNT: 4.89 M/UL (ref 4.3–5.9)
ERYTHROCYTE [DISTWIDTH] IN BLOOD: 14.5 % (ref 11.5–15.5)
GLOBULIN SER CALC-MCNC: 1.6 G/DL (ref 1.6–3.3)
GLUCOSE SERPL-MCNC: 121 MG/DL (ref 70–110)
HBA1C MFR BLD: 6.3 % (ref 4–6)
HCT VFR BLD AUTO: 43.1 % (ref 39–53)
HDLC SERPL-MCNC: 38 MG/DL (ref 40–60)
HGB BLD-MCNC: 13.8 GM/DL (ref 13–17.5)
LDLC SERPL CALC-MCNC: 53.4 MG/DL (ref 0–131)
LYMPHOCYTES # SPEC AUTO: 1.6 K/UL (ref 1–4.8)
LYMPHOCYTES NFR SPEC AUTO: 20 %
MAGNESIUM SPEC-SCNC: 2.2 MG/DL (ref 1.5–2.4)
MCH RBC QN AUTO: 28.3 PG (ref 25–35)
MCHC RBC AUTO-ENTMCNC: 32 G/DL (ref 31–37)
MCV RBC AUTO: 88.2 FL (ref 80–100)
MONOCYTES # BLD AUTO: 0.6 K/UL (ref 0–1)
MONOCYTES NFR BLD AUTO: 8 %
NEUTROPHILS # BLD AUTO: 5 K/UL (ref 1.3–7.7)
NEUTROPHILS NFR BLD AUTO: 65 %
PLATELET # BLD AUTO: 192 K/UL (ref 150–450)
POTASSIUM SERPL-SCNC: 4.8 MMOL/L (ref 3.5–5.5)
PROT SERPL-MCNC: 6 G/DL (ref 6.2–8.2)
SODIUM SERPL-SCNC: 143 MMOL/L (ref 135–145)
T4 FREE SERPL-MCNC: 1.2 NG/DL (ref 0.8–1.8)
TRIGL SERPL-MCNC: 188 MG/DL (ref 0–149)
VIT B12 SERPL-MCNC: 912 PG/ML (ref 200–944)
VLDLC SERPL CALC-MCNC: 37.6 MG/DL (ref 5–40)
WBC # BLD AUTO: 7.7 K/UL (ref 3.8–10.6)

## 2019-07-15 PROCEDURE — 84439 ASSAY OF FREE THYROXINE: CPT

## 2019-07-15 PROCEDURE — 80061 LIPID PANEL: CPT

## 2019-07-15 PROCEDURE — 83036 HEMOGLOBIN GLYCOSYLATED A1C: CPT

## 2019-07-15 PROCEDURE — 85025 COMPLETE CBC W/AUTO DIFF WBC: CPT

## 2019-07-15 PROCEDURE — 83735 ASSAY OF MAGNESIUM: CPT

## 2019-07-15 PROCEDURE — 82785 ASSAY OF IGE: CPT

## 2019-07-15 PROCEDURE — 36415 COLL VENOUS BLD VENIPUNCTURE: CPT

## 2019-07-15 PROCEDURE — 82607 VITAMIN B-12: CPT

## 2019-07-15 PROCEDURE — 86606 ASPERGILLUS ANTIBODY: CPT

## 2019-07-15 PROCEDURE — 86001 ALLERGEN SPECIFIC IGG: CPT

## 2019-07-15 PROCEDURE — 86609 BACTERIUM ANTIBODY: CPT

## 2019-07-15 PROCEDURE — 84443 ASSAY THYROID STIM HORMONE: CPT

## 2019-07-15 PROCEDURE — 80053 COMPREHEN METABOLIC PANEL: CPT

## 2019-11-08 ENCOUNTER — HOSPITAL ENCOUNTER (OUTPATIENT)
Dept: HOSPITAL 47 - LABWHC1 | Age: 79
End: 2019-11-08
Attending: FAMILY MEDICINE
Payer: MEDICARE

## 2019-11-08 DIAGNOSIS — E11.65: ICD-10-CM

## 2019-11-08 DIAGNOSIS — E78.5: ICD-10-CM

## 2019-11-08 DIAGNOSIS — J44.9: Primary | ICD-10-CM

## 2019-11-08 LAB
ALBUMIN SERPL-MCNC: 4.8 G/DL (ref 3.8–4.9)
ALBUMIN/GLOB SERPL: 2.82 G/DL (ref 1.6–3.17)
ALP SERPL-CCNC: 62 U/L (ref 41–126)
ALT SERPL-CCNC: 35 U/L (ref 10–49)
ANION GAP SERPL CALC-SCNC: 7.3 MMOL/L (ref 4–12)
AST SERPL-CCNC: 37 U/L (ref 14–35)
BASOPHILS # BLD AUTO: 0.1 K/UL (ref 0–0.2)
BASOPHILS NFR BLD AUTO: 1 %
BUN SERPL-SCNC: 15 MG/DL (ref 9–27)
BUN/CREAT SERPL: 13.64 RATIO (ref 12–20)
CALCIUM SPEC-MCNC: 10 MG/DL (ref 8.7–10.3)
CHLORIDE SERPL-SCNC: 107 MMOL/L (ref 96–109)
CHOLEST SERPL-MCNC: 136 MG/DL (ref 0–200)
CK SERPL-CCNC: 349 U/L (ref 35–257)
CO2 SERPL-SCNC: 26.7 MMOL/L (ref 21.6–31.8)
EOSINOPHIL # BLD AUTO: 0.5 K/UL (ref 0–0.7)
EOSINOPHIL NFR BLD AUTO: 7 %
ERYTHROCYTE [DISTWIDTH] IN BLOOD BY AUTOMATED COUNT: 5.1 M/UL (ref 4.3–5.9)
ERYTHROCYTE [DISTWIDTH] IN BLOOD: 12.7 % (ref 11.5–15.5)
GLOBULIN SER CALC-MCNC: 1.7 G/DL (ref 1.6–3.3)
GLUCOSE SERPL-MCNC: 108 MG/DL (ref 70–110)
HCT VFR BLD AUTO: 46.2 % (ref 39–53)
HDLC SERPL-MCNC: 38 MG/DL (ref 40–60)
HGB BLD-MCNC: 15 GM/DL (ref 13–17.5)
LDLC SERPL CALC-MCNC: 50.6 MG/DL (ref 0–131)
LYMPHOCYTES # SPEC AUTO: 2 K/UL (ref 1–4.8)
LYMPHOCYTES NFR SPEC AUTO: 27 %
MCH RBC QN AUTO: 29.3 PG (ref 25–35)
MCHC RBC AUTO-ENTMCNC: 32.4 G/DL (ref 31–37)
MCV RBC AUTO: 90.6 FL (ref 80–100)
MONOCYTES # BLD AUTO: 0.6 K/UL (ref 0–1)
MONOCYTES NFR BLD AUTO: 8 %
NEUTROPHILS # BLD AUTO: 3.9 K/UL (ref 1.3–7.7)
NEUTROPHILS NFR BLD AUTO: 54 %
PLATELET # BLD AUTO: 210 K/UL (ref 150–450)
POTASSIUM SERPL-SCNC: 5.2 MMOL/L (ref 3.5–5.5)
PROT SERPL-MCNC: 6.5 G/DL (ref 6.2–8.2)
SODIUM SERPL-SCNC: 141 MMOL/L (ref 135–145)
TRIGL SERPL-MCNC: 237 MG/DL (ref 0–149)
VLDLC SERPL CALC-MCNC: 47.4 MG/DL (ref 5–40)
WBC # BLD AUTO: 7.3 K/UL (ref 3.8–10.6)

## 2019-11-08 PROCEDURE — 80053 COMPREHEN METABOLIC PANEL: CPT

## 2019-11-08 PROCEDURE — 36415 COLL VENOUS BLD VENIPUNCTURE: CPT

## 2019-11-08 PROCEDURE — 82043 UR ALBUMIN QUANTITATIVE: CPT

## 2019-11-08 PROCEDURE — 84443 ASSAY THYROID STIM HORMONE: CPT

## 2019-11-08 PROCEDURE — 85025 COMPLETE CBC W/AUTO DIFF WBC: CPT

## 2019-11-08 PROCEDURE — 82550 ASSAY OF CK (CPK): CPT

## 2019-11-08 PROCEDURE — 80061 LIPID PANEL: CPT

## 2019-11-08 PROCEDURE — 82570 ASSAY OF URINE CREATININE: CPT

## 2019-11-13 ENCOUNTER — HOSPITAL ENCOUNTER (OUTPATIENT)
Dept: HOSPITAL 47 - RADCTMAIN | Age: 79
Discharge: HOME | End: 2019-11-13
Attending: FAMILY MEDICINE
Payer: COMMERCIAL

## 2019-11-13 DIAGNOSIS — G31.1: Primary | ICD-10-CM

## 2019-11-13 DIAGNOSIS — G44.201: ICD-10-CM

## 2019-11-13 PROCEDURE — 70470 CT HEAD/BRAIN W/O & W/DYE: CPT

## 2019-11-13 NOTE — CT
EXAMINATION TYPE: CT brain wo/w con

 

DATE OF EXAM: 11/13/2019

 

COMPARISON:

 

HISTORY: headaches and memory loss.

 

CT DLP: 2361.8 mGycm

Automated exposure control for dose reduction was used.

 

CONTRAST: 

CT scan of the head is performed with IV Contrast, patient injected with 100 mL of Isovue 300.

 

FINDINGS: 

There is no abnormal enhancing mass or midline shift identified.  The ventricles and sulci are within
 normal limits in size.  Periventricular white matter shows some patchy low attenuation. Cortical atr
ophy is likely age-related. The globes are intact and the visualized sinuses are clear.

 

IMPRESSION: 

Stable head CT exam. Age-related atrophy and probable chronic small vessel ischemia and no abnormal e
nhancement

## 2020-01-14 ENCOUNTER — HOSPITAL ENCOUNTER (OUTPATIENT)
Dept: HOSPITAL 47 - PNWHC3 | Age: 80
End: 2020-01-14
Attending: ANESTHESIOLOGY
Payer: COMMERCIAL

## 2020-01-14 VITALS — DIASTOLIC BLOOD PRESSURE: 76 MMHG | SYSTOLIC BLOOD PRESSURE: 145 MMHG | HEART RATE: 63 BPM | RESPIRATION RATE: 18 BRPM

## 2020-01-14 DIAGNOSIS — J44.9: ICD-10-CM

## 2020-01-14 DIAGNOSIS — Z79.1: ICD-10-CM

## 2020-01-14 DIAGNOSIS — I25.2: ICD-10-CM

## 2020-01-14 DIAGNOSIS — E78.5: ICD-10-CM

## 2020-01-14 DIAGNOSIS — R51: ICD-10-CM

## 2020-01-14 DIAGNOSIS — Z87.891: ICD-10-CM

## 2020-01-14 DIAGNOSIS — Z79.82: ICD-10-CM

## 2020-01-14 DIAGNOSIS — I10: ICD-10-CM

## 2020-01-14 DIAGNOSIS — Z79.899: ICD-10-CM

## 2020-01-14 DIAGNOSIS — I25.10: ICD-10-CM

## 2020-01-14 DIAGNOSIS — M54.81: Primary | ICD-10-CM

## 2020-01-14 DIAGNOSIS — Z79.84: ICD-10-CM

## 2020-01-14 DIAGNOSIS — E11.9: ICD-10-CM

## 2020-01-14 PROCEDURE — 99211 OFF/OP EST MAY X REQ PHY/QHP: CPT

## 2020-01-14 NOTE — P.PAINCN
History of Present Illness





- Reason for Consult


Consult date: 20





- History of Present Illness


This is initial consultation visit for this 80-year-old years old male with a 

chronic history of severe headaches started 3 years ago without any initiating 

event and he reported the headache usually radiates from the base of the skull 

towards the top of the head, patient was evaluated by a neurologist to Him and 

He Is Diagnosed Him with Bilateral Occipital Neuralgia and He Was Referred to Us

to Have Bilateral Occipital Nerve Block, patient reported that the headache is 

not associated with any aura and is not associated with any visual changes or 

any unusual smell, he denies any motor or sensory deficit in the upper 

extremity, he denies any numbness or tingling sensation in the upper extremity





Past Medical History


Past Medical History: Asthma, Coronary Artery Disease (CAD), COPD, Dementia, 

Diabetes Mellitus, Deep Vein Thrombosis (DVT), GERD/Reflux, Hyperlipidemia, 

Hypertension, Memory Impairment, Myocardial Infarction (MI), Osteoarthritis (O

A), Pneumonia, Pulmonary Embolus (PE), Skin Disorder, Sleep Apnea/CPAP/BIPAP


Additional Past Medical History / Comment(s): Migraines.  KATHY PE and LT DVT 

2016.  Bruises easily.  Hx Kidney Stones.  Dementia.  USES CPAP.


Last Myocardial Infarction Date::  AND 


History of Any Multi-Drug Resistant Organisms: None Reported


Past Surgical History: Heart Catheterization With Stent


Additional Past Surgical History / Comment(s): 7 STENTS.  LT KNEE REPLACEMENT.  

LT ACHILLES TENDON REPAIR.   bilat CATARACT 16.


Past Anesthesia/Blood Transfusion Reactions: Previous Problems w/ Anesthesia


Additional Past Anesthesia/Blood Transfusion Reaction / Comm: DIFFICULTY WAKING 

UP.


Date of Last Stent Placement:: 


Past Psychological History: Anxiety, Depression, Panic Disorder


Additional Psychological History / Comment(s): SINCE , GETS MILD DEPRESSION 

DURING WINTER MONTHS(SAD). Dementia


Smoking Status: Former smoker


Past Alcohol Use History: None Reported


Additional Past Alcohol Use History / Comment(s): SMOKED >40 YEARS, 3PPD, QUIT 

.


Past Drug Use History: None Reported





- Past Family History


  ** Father


Family Medical History: Dementia (Father  at age of 80 at extended care [facility] from dementia.)





  ** Mother


Family Medical History: Coronary Artery Disease (CAD)


Additional Family Medical History / Comment(s): QUAD BYPASS





  ** Brother(s)


Family Medical History: Coronary Artery Disease (CAD)





  ** Sister(s)


Family Medical History: No Reported History





  ** Daughter(s)


Family Medical History: No Reported History





  ** Son(s)


Family Medical History: No Reported History





Medications and Allergies


                                Home Medications











 Medication  Instructions  Recorded  Confirmed  Type


 


Atenolol [Tenormin] 25 mg PO QAM 14 History


 


Famotidine 40 mg PO BID 14 History


 


Lisinopril [Zestril] 2.5 mg PO HS 14 History


 


metFORMIN HCL [Glucophage] 500 mg PO BID 14 History


 


Aspirin 81 mg PO QAM 16 History


 


Albuterol Inhaler [Ventolin Hfa 1 puff INHALATION RT-Q6H PRN 16 

History





Inhaler]    


 


Cyanocobalamin [Vitamin B-12] 500 mcg PO DAILY 16 History


 


Lovastatin [Mevacor] 40 mg PO HS 16 History


 


Cholecalciferol [Vitamin D3 (25 1,000 unit PO DAILY 17 History





Mcg = 1000 Iu)]    


 


Meloxicam 15 mg PO DAILY 17 History


 


Montelukast [Singulair] 10 mg PO HS 17 History


 


Multivitamins, Thera [Multivitamin 1 tab PO DAILY 17 History





(formulary)]    


 


Donepezil [Aricept] 10 mg PO HS 18 History


 


Memantine [Namenda] 10 mg PO BID 18 History


 


traZODone HCL 50 mg PO HS 19 History


 


Budesonide [Pulmicort] 1 mg INHALATION RT-BID #60 nebu 19 Rx








                                    Allergies











Allergy/AdvReac Type Severity Reaction Status Date / Time


 


No Known Allergies Allergy   Verified 20 13:04














Physical Exam


Vitals: 


                                   Vital Signs











  Pulse Resp BP Pulse Ox


 


 20 13:11  63  18  145/76  96








                                Intake and Output











 20





 06:59 14:59 22:59


 


Other:   


 


  Weight  95.254 kg 











    


   Physical Examinations  :


    -Constitutiona       : Cooperative , not in acute distress .


    -HEENT                :  nech :  supple ,  no Lymphadenopathy  , normal  

thyroid  size .


                               :   eyes  :  no ptosis , no icterus,  no 

photophobia .  


                               :   ENT  : normal   of hearing  , normal  

oropharynx     , no Thrush .  


    - Respiratory        : Chest clear to auscultations Bilaterally  ,  no 

wheezing   , no Rhonchi   .  


    - Cardiovascula    : regular rate and rhythem , S1 ,  S2  ,   no  S3 ,  no  

S4.


    - Gastrointestina   :  abdomen soft  no tenderness , bowel sounds  , no 

organomegally  .


    - Genitourinary     :   Defferred .                                         

                                                                                

                                                                                

                                                                                

                                                                                

           


    - neurologic         :   Cranial nerve II   to  XII  intact ,  no   focal 

neurological deffecit  .


                                 Mild expressive aphasia


    -psychatric          : alert ,  oriented  X 3  ,   appropriate affect   , 

intact judgment  and insight .  


    -Lymphatic          :    no Lymphadenopathy .


   - musculoskeltal   :     


                                 Cervical Spine 


                                         motor  stregnth in the deltoid and 

biceps,   normal   right side    ,  normal  Left side


                                         motor  stregnth biceps and the wrist 

extensors   normal right side ,normal left side .


                                         motor stregnth in the triceps muscle . 

normal  Right side , normal  Left side  


                                         deep tendon reflexes  normal   at the 

biceps ,   normal  at Brachioradialis , normal  at triceps.


                                         cervical facet loading test: Positive 

Bilaterally


                                         Tenderness over the occipital nerve 

bilaterally


                                   Lumber spine


                                         moter stegnth lower extremities ,thigh 

and legs  5/5 Right side ,  5/5  Left side 





Results


Comments: 


Computed tomography scan of the brain= without contrast showed age-related atrop

hy





Assessment and Plan


Plan: 


Assessment and plan= occipital neuralgia


                                Cervicogenic headache.


                                Patient will be good candidate to have bilateral

 occipital nerve block, procedure risk and benefits and alternatives discussed 

with 


                                Patient and his wife and they agreed with the 

preceding


Time with Patient: Greater than 30





PQRS Measure Charge Sheet


Measure #130: Documentation of Current Meds in Medical Chart: Patient's 

medications documented in chart


Measure #226: Tobacco Use: Screen & Cessation Intervention: Pt screened for 

tobacco use AND intervention given


Measure #111: Pneumonia Vaccination: Pneumococcal vaccine NOT administered or 

previously given


Measure #47: Advance Care Plan: Advance care planning discussed & documented, pt

 chose/unable to give


Measure #412: Opioid Treatment Agreement: No documentation of signed opioid 

treatment agreement


Measure #408: Opioid Therapy Follow-up Evaluation: Patient had NO f/u eval 

minimum every 3 months during opioid therapy


Measure #317: Preventitive Care & Scrn High Bld Press & F/U: Pre-hypertensive or

 hypertensive BP documented, pt will f/u with PCP


Measure #128: Body Mass Index (BMI) Screening & Follow-up: BMI documented ABOVE 

normal parameters - f/u documented


Measure #131: Pain Assessment & Follow-up: Pain positive & plan documented, 

Follow-up scheduled


Measure #431: Unhealthy Alcohol Use Preventative Care & Scrn: Patient not 

identified as an unhealthy alcohol user


PQRS Narrative: 


                                        





Smoking Status                   Former smoker


Blood Pressure                   145/76


Pain Intensity [Head]            7


Hx Alcohol Use (MH)              No








Home Medications: 


Ambulatory Orders





Atenolol [Tenormin] 25 mg PO QAM 14 


Famotidine 40 mg PO BID 14 


Lisinopril [Zestril] 2.5 mg PO HS 14 


metFORMIN HCL [Glucophage] 500 mg PO BID 14 


Aspirin 81 mg PO QAM 16 


Albuterol Inhaler [Ventolin Hfa Inhaler] 1 puff INHALATION RT-Q6H PRN 16 


Cyanocobalamin [Vitamin B-12] 500 mcg PO DAILY 16 


Lovastatin [Mevacor] 40 mg PO HS 16 


Cholecalciferol [Vitamin D3 (25 Mcg = 1000 Iu)] 1,000 unit PO DAILY 17 


Meloxicam 15 mg PO DAILY 17 


Montelukast [Singulair] 10 mg PO HS 17 


Multivitamins, Thera [Multivitamin (formulary)] 1 tab PO DAILY 17 


Donepezil [Aricept] 10 mg PO HS 18 


Memantine [Namenda] 10 mg PO BID 18 


traZODone HCL 50 mg PO HS 19 


Budesonide [Pulmicort] 1 mg INHALATION RT-BID #60 nebu 19

## 2020-01-23 ENCOUNTER — HOSPITAL ENCOUNTER (OUTPATIENT)
Dept: HOSPITAL 47 - ORPAIN | Age: 80
Discharge: HOME | End: 2020-01-23
Attending: ANESTHESIOLOGY
Payer: COMMERCIAL

## 2020-01-23 VITALS — BODY MASS INDEX: 28.5 KG/M2

## 2020-01-23 VITALS — SYSTOLIC BLOOD PRESSURE: 133 MMHG | DIASTOLIC BLOOD PRESSURE: 71 MMHG

## 2020-01-23 VITALS — RESPIRATION RATE: 16 BRPM | TEMPERATURE: 97.9 F

## 2020-01-23 VITALS — HEART RATE: 63 BPM

## 2020-01-23 DIAGNOSIS — R23.8: ICD-10-CM

## 2020-01-23 DIAGNOSIS — I25.10: ICD-10-CM

## 2020-01-23 DIAGNOSIS — Z86.711: ICD-10-CM

## 2020-01-23 DIAGNOSIS — Z99.89: ICD-10-CM

## 2020-01-23 DIAGNOSIS — R47.01: ICD-10-CM

## 2020-01-23 DIAGNOSIS — F41.0: ICD-10-CM

## 2020-01-23 DIAGNOSIS — F41.9: ICD-10-CM

## 2020-01-23 DIAGNOSIS — F33.9: ICD-10-CM

## 2020-01-23 DIAGNOSIS — Z86.718: ICD-10-CM

## 2020-01-23 DIAGNOSIS — Z82.49: ICD-10-CM

## 2020-01-23 DIAGNOSIS — Z98.890: ICD-10-CM

## 2020-01-23 DIAGNOSIS — J44.9: ICD-10-CM

## 2020-01-23 DIAGNOSIS — F03.90: ICD-10-CM

## 2020-01-23 DIAGNOSIS — Z81.8: ICD-10-CM

## 2020-01-23 DIAGNOSIS — Z87.01: ICD-10-CM

## 2020-01-23 DIAGNOSIS — G43.909: ICD-10-CM

## 2020-01-23 DIAGNOSIS — Z79.82: ICD-10-CM

## 2020-01-23 DIAGNOSIS — Z79.51: ICD-10-CM

## 2020-01-23 DIAGNOSIS — Z98.42: ICD-10-CM

## 2020-01-23 DIAGNOSIS — Z91.89: ICD-10-CM

## 2020-01-23 DIAGNOSIS — E11.9: ICD-10-CM

## 2020-01-23 DIAGNOSIS — Z87.891: ICD-10-CM

## 2020-01-23 DIAGNOSIS — Z96.652: ICD-10-CM

## 2020-01-23 DIAGNOSIS — E78.5: ICD-10-CM

## 2020-01-23 DIAGNOSIS — R41.3: ICD-10-CM

## 2020-01-23 DIAGNOSIS — M54.81: Primary | ICD-10-CM

## 2020-01-23 DIAGNOSIS — Z98.41: ICD-10-CM

## 2020-01-23 DIAGNOSIS — I10: ICD-10-CM

## 2020-01-23 DIAGNOSIS — G47.30: ICD-10-CM

## 2020-01-23 DIAGNOSIS — M19.90: ICD-10-CM

## 2020-01-23 DIAGNOSIS — Z87.442: ICD-10-CM

## 2020-01-23 DIAGNOSIS — Z79.899: ICD-10-CM

## 2020-01-23 DIAGNOSIS — I25.2: ICD-10-CM

## 2020-01-23 DIAGNOSIS — K21.9: ICD-10-CM

## 2020-01-23 DIAGNOSIS — Z79.84: ICD-10-CM

## 2020-01-23 DIAGNOSIS — Z95.5: ICD-10-CM

## 2020-01-23 LAB
GLUCOSE BLD-MCNC: 103 MG/DL (ref 75–99)
GLUCOSE BLD-MCNC: 99 MG/DL (ref 75–99)

## 2020-01-23 PROCEDURE — 64405 NJX AA&/STRD GR OCPL NRV: CPT

## 2020-01-23 NOTE — P.PCN
Date of Procedure: 01/23/20


Procedure(s) Performed: 


Pre-operative diagnosis:


bilateral occipital neuralgia





Post Operative Diagnosis


same





Procedure:


bilateral occipital nerve block





ANESTHESIA: none





EBL: Minimal





PROCEDURE INDICATION: The patient with neck pain and headache secondary to  

occipital neuralgia  unresponsive to conservative treatments. 





PROCEDURE DESCRIPTION / TECHNIQUE: The patient was seen and identified in the 

preoperative area. Risks, benefits, complications, and alternatives were 

discussed with the patient, the patient agreed to proceed with the procedure and

signed the consent. IV was started. Vital signs remained stable throughout the 

procedure.





Patient was taken to the OR and time out was completed. The patient was placed 

in the seated position on the procedure table. The cervical area and bilateral 

occiptial area were prepped with alcohol swab.  Vital signs were closely 

monitored during the procedure.  





The bilateral occiptal ridge was palpated and was then accessed with a 25 G 

needle. Then after negative aspiration, 3 ml of the block solution containing  5

ml of ropivacaine 0.5% and Kenalog 40  mg was injected to the region of each 

occipital nerve. Needle was withdrawn intact. 





Patient tolerated procedure well. No acute complications.

## 2020-02-20 ENCOUNTER — HOSPITAL ENCOUNTER (OUTPATIENT)
Dept: HOSPITAL 47 - ORPAIN | Age: 80
Discharge: HOME | End: 2020-02-20
Attending: ANESTHESIOLOGY
Payer: COMMERCIAL

## 2020-02-20 VITALS — TEMPERATURE: 97 F

## 2020-02-20 VITALS — HEART RATE: 65 BPM | SYSTOLIC BLOOD PRESSURE: 117 MMHG | DIASTOLIC BLOOD PRESSURE: 63 MMHG | RESPIRATION RATE: 17 BRPM

## 2020-02-20 VITALS — BODY MASS INDEX: 28.5 KG/M2

## 2020-02-20 DIAGNOSIS — Z79.82: ICD-10-CM

## 2020-02-20 DIAGNOSIS — M54.81: Primary | ICD-10-CM

## 2020-02-20 LAB — GLUCOSE BLD-MCNC: 98 MG/DL (ref 75–99)

## 2020-02-20 PROCEDURE — 64405 NJX AA&/STRD GR OCPL NRV: CPT

## 2020-02-20 NOTE — P.PCN
Date of Procedure: 02/20/20


Procedure(s) Performed: 


Pre-operative diagnosis:


bilateral occipital neuralgia





Post Operative Diagnosis


same





Procedure:


bilateral occipital nerve block





ANESTHESIA: none





EBL: Minimal





PROCEDURE INDICATION: The patient with neck pain and headache secondary to  

occipital neuralgia  unresponsive to conservative treatments. 





PROCEDURE DESCRIPTION / TECHNIQUE: The patient was seen and identified in the 

preoperative area. Risks, benefits, complications, and alternatives were 

discussed with the patient, the patient agreed to proceed with the procedure and

signed the consent. IV was started. Vital signs remained stable throughout the 

procedure.





Patient was taken to the OR and time out was completed. The patient was placed 

in the seated position on the procedure table. The cervical area and bilateral 

occiptial area were prepped with alcohol swab.  Vital signs were closely 

monitored during the procedure.  





The bilateral occiptal ridge was palpated and was then accessed with a 25 G 

needle. Then after negative aspiration, 3 ml of the block solution containing  5

ml of ropivacaine 0.5% and Depo-Medrol 40 mg was injected to the region of each 

occipital nerve. Needle was withdrawn intact. 





Patient tolerated procedure well. No acute complications.

## 2020-03-28 ENCOUNTER — HOSPITAL ENCOUNTER (OUTPATIENT)
Dept: HOSPITAL 47 - EC | Age: 80
Setting detail: OBSERVATION
LOS: 2 days | Discharge: HOME | End: 2020-03-30
Attending: INTERNAL MEDICINE | Admitting: INTERNAL MEDICINE
Payer: COMMERCIAL

## 2020-03-28 DIAGNOSIS — Z79.82: ICD-10-CM

## 2020-03-28 DIAGNOSIS — F33.9: ICD-10-CM

## 2020-03-28 DIAGNOSIS — Z98.42: ICD-10-CM

## 2020-03-28 DIAGNOSIS — K21.9: ICD-10-CM

## 2020-03-28 DIAGNOSIS — Z79.1: ICD-10-CM

## 2020-03-28 DIAGNOSIS — Z81.8: ICD-10-CM

## 2020-03-28 DIAGNOSIS — Z87.442: ICD-10-CM

## 2020-03-28 DIAGNOSIS — Z99.89: ICD-10-CM

## 2020-03-28 DIAGNOSIS — G47.30: ICD-10-CM

## 2020-03-28 DIAGNOSIS — R47.01: ICD-10-CM

## 2020-03-28 DIAGNOSIS — Z82.49: ICD-10-CM

## 2020-03-28 DIAGNOSIS — M19.90: ICD-10-CM

## 2020-03-28 DIAGNOSIS — F41.1: ICD-10-CM

## 2020-03-28 DIAGNOSIS — Z86.718: ICD-10-CM

## 2020-03-28 DIAGNOSIS — Z79.84: ICD-10-CM

## 2020-03-28 DIAGNOSIS — J44.9: ICD-10-CM

## 2020-03-28 DIAGNOSIS — G43.909: ICD-10-CM

## 2020-03-28 DIAGNOSIS — E78.5: ICD-10-CM

## 2020-03-28 DIAGNOSIS — R07.89: Primary | ICD-10-CM

## 2020-03-28 DIAGNOSIS — I11.9: ICD-10-CM

## 2020-03-28 DIAGNOSIS — Z86.711: ICD-10-CM

## 2020-03-28 DIAGNOSIS — Z79.899: ICD-10-CM

## 2020-03-28 DIAGNOSIS — Z79.51: ICD-10-CM

## 2020-03-28 DIAGNOSIS — F01.50: ICD-10-CM

## 2020-03-28 DIAGNOSIS — I25.10: ICD-10-CM

## 2020-03-28 DIAGNOSIS — Z87.891: ICD-10-CM

## 2020-03-28 DIAGNOSIS — Z87.01: ICD-10-CM

## 2020-03-28 DIAGNOSIS — I25.2: ICD-10-CM

## 2020-03-28 DIAGNOSIS — F41.0: ICD-10-CM

## 2020-03-28 DIAGNOSIS — Z98.41: ICD-10-CM

## 2020-03-28 DIAGNOSIS — Z87.2: ICD-10-CM

## 2020-03-28 DIAGNOSIS — E11.9: ICD-10-CM

## 2020-03-28 LAB
ALBUMIN SERPL-MCNC: 4.2 G/DL (ref 3.5–5)
ALP SERPL-CCNC: 59 U/L (ref 38–126)
ALT SERPL-CCNC: 34 U/L (ref 4–49)
ANION GAP SERPL CALC-SCNC: 6 MMOL/L
APTT BLD: 22.7 SEC (ref 22–30)
AST SERPL-CCNC: 38 U/L (ref 17–59)
BASOPHILS # BLD AUTO: 0 K/UL (ref 0–0.2)
BASOPHILS NFR BLD AUTO: 0 %
BUN SERPL-SCNC: 18 MG/DL (ref 9–20)
CALCIUM SPEC-MCNC: 9.4 MG/DL (ref 8.4–10.2)
CHLORIDE SERPL-SCNC: 106 MMOL/L (ref 98–107)
CO2 SERPL-SCNC: 26 MMOL/L (ref 22–30)
D DIMER PPP FEU-MCNC: 1.26 MG/L FEU (ref ?–0.6)
EOSINOPHIL # BLD AUTO: 0 K/UL (ref 0–0.7)
EOSINOPHIL NFR BLD AUTO: 1 %
ERYTHROCYTE [DISTWIDTH] IN BLOOD BY AUTOMATED COUNT: 4.88 M/UL (ref 4.3–5.9)
ERYTHROCYTE [DISTWIDTH] IN BLOOD: 13.2 % (ref 11.5–15.5)
GLUCOSE SERPL-MCNC: 120 MG/DL (ref 74–99)
HCT VFR BLD AUTO: 43.4 % (ref 39–53)
HGB BLD-MCNC: 14.4 GM/DL (ref 13–17.5)
INR PPP: 1 (ref ?–1.2)
LYMPHOCYTES # SPEC AUTO: 1.3 K/UL (ref 1–4.8)
LYMPHOCYTES NFR SPEC AUTO: 23 %
MAGNESIUM SPEC-SCNC: 2.3 MG/DL (ref 1.6–2.3)
MCH RBC QN AUTO: 29.6 PG (ref 25–35)
MCHC RBC AUTO-ENTMCNC: 33.3 G/DL (ref 31–37)
MCV RBC AUTO: 88.9 FL (ref 80–100)
MONOCYTES # BLD AUTO: 0.5 K/UL (ref 0–1)
MONOCYTES NFR BLD AUTO: 9 %
NEUTROPHILS # BLD AUTO: 3.7 K/UL (ref 1.3–7.7)
NEUTROPHILS NFR BLD AUTO: 66 %
PLATELET # BLD AUTO: 216 K/UL (ref 150–450)
POTASSIUM SERPL-SCNC: 4.6 MMOL/L (ref 3.5–5.1)
PROT SERPL-MCNC: 6.7 G/DL (ref 6.3–8.2)
PT BLD: 10 SEC (ref 9–12)
SODIUM SERPL-SCNC: 138 MMOL/L (ref 137–145)
WBC # BLD AUTO: 5.7 K/UL (ref 3.8–10.6)

## 2020-03-28 PROCEDURE — 83735 ASSAY OF MAGNESIUM: CPT

## 2020-03-28 PROCEDURE — 85730 THROMBOPLASTIN TIME PARTIAL: CPT

## 2020-03-28 PROCEDURE — 85610 PROTHROMBIN TIME: CPT

## 2020-03-28 PROCEDURE — 94640 AIRWAY INHALATION TREATMENT: CPT

## 2020-03-28 PROCEDURE — 85379 FIBRIN DEGRADATION QUANT: CPT

## 2020-03-28 PROCEDURE — 71046 X-RAY EXAM CHEST 2 VIEWS: CPT

## 2020-03-28 PROCEDURE — 85025 COMPLETE CBC W/AUTO DIFF WBC: CPT

## 2020-03-28 PROCEDURE — 84484 ASSAY OF TROPONIN QUANT: CPT

## 2020-03-28 PROCEDURE — 71275 CT ANGIOGRAPHY CHEST: CPT

## 2020-03-28 PROCEDURE — 80053 COMPREHEN METABOLIC PANEL: CPT

## 2020-03-28 PROCEDURE — 99285 EMERGENCY DEPT VISIT HI MDM: CPT

## 2020-03-28 PROCEDURE — 93005 ELECTROCARDIOGRAM TRACING: CPT

## 2020-03-28 PROCEDURE — 96374 THER/PROPH/DIAG INJ IV PUSH: CPT

## 2020-03-28 PROCEDURE — 83880 ASSAY OF NATRIURETIC PEPTIDE: CPT

## 2020-03-28 PROCEDURE — 93306 TTE W/DOPPLER COMPLETE: CPT

## 2020-03-28 PROCEDURE — 36415 COLL VENOUS BLD VENIPUNCTURE: CPT

## 2020-03-28 RX ADMIN — FAMOTIDINE SCH MG: 20 TABLET, FILM COATED ORAL at 20:52

## 2020-03-28 RX ADMIN — DONEPEZIL HYDROCHLORIDE SCH MG: 10 TABLET ORAL at 20:52

## 2020-03-28 RX ADMIN — ATORVASTATIN CALCIUM SCH MG: 10 TABLET, FILM COATED ORAL at 20:52

## 2020-03-28 RX ADMIN — LISINOPRIL SCH MG: 2.5 TABLET ORAL at 20:51

## 2020-03-28 NOTE — XR
EXAMINATION TYPE: XR chest 2V

 

DATE OF EXAM: 3/28/2020

 

COMPARISON: Chest x-ray June 28, 2019. CT chest June 8, 2019.

 

HISTORY: History of COPD with chest pain and shortness of breath today.

 

TECHNIQUE:  Frontal and lateral views of the chest are obtained.

 

FINDINGS:  There is chronic parenchymal change without suspicious new focal air space opacity, pleura
l effusion, or pneumothorax seen.  The cardiac silhouette size is within normal limits with atheroscl
erotic change in the aortic knob. Multilevel spurring in thoracic spine redemonstrated.

 

IMPRESSION:  Chronic changes without acute pulmonary process. No significant change from prior studie
s.

## 2020-03-28 NOTE — CT
EXAMINATION TYPE: CT chest angio for PE

 

DATE OF EXAM: 3/28/2020

 

COMPARISON: CTA chest June 8, 2019

 

HISTORY: Mid chest pain, dyspnea. Elevated d-dimer.

 

CT DLP: 496.3 mGycm. Automated Exposure Control for Dose Reduction was Utilized.

 

 

CONTRAST: 

CTA scan of the thorax is performed with IV Contrast, patient injected with 100 mL of Isovue 370, pul
monary embolism protocol.  MIP Images are created on CT scanner and reviewed.

 

FINDINGS:

 

LUNGS: Slightly elevated left hemidiaphragm redemonstrated. Background mild to moderate underlying em
physematous change with dependent atelectasis left lower lobe and patchy mild left greater than right
 bibasilar linear atelectasis and/or scarring. No suspicious focal groundglass opacity and consolidat
ion bilaterally. No pleural effusion or pneumothorax. No suspicious pulmonary nodules or masses.

 

MEDIASTINUM: There is satisfactory enhancement of the pulmonary artery and its branches, there is no 
CT evidence for pulmonary embolism.  There are no greater than 1 cm hilar or mediastinal lymph nodes.
   No cardiomegaly or pericardial effusion is seen. Main pulmonary artery dilated at 3.0 cm, CT findi
ngs suggestive of underlying pulmonary hypertension. Three-vessel coronary artery calcification and/o
r stents. Mild/moderate calcified plaque of the aorta extends into branch vessels.

 

OTHER: Persistent underlying dextroconvex scoliosis centered mid thoracic spine with moderate to judy
re multilevel spurring. Small degree of bilateral subareolar gynecomastia redemonstrated

 

IMPRESSION: 

1. No CT evidence for acute pulmonary embolism.

2. Mild to moderate underlying emphysematous change with mild areas of parenchymal scarring. No new s
uspicious focal infiltrate.

## 2020-03-29 VITALS — RESPIRATION RATE: 20 BRPM

## 2020-03-29 LAB
ALBUMIN SERPL-MCNC: 3.7 G/DL (ref 3.5–5)
ALP SERPL-CCNC: 56 U/L (ref 38–126)
ALT SERPL-CCNC: 30 U/L (ref 4–49)
ANION GAP SERPL CALC-SCNC: 8 MMOL/L
AST SERPL-CCNC: 33 U/L (ref 17–59)
BASOPHILS # BLD AUTO: 0 K/UL (ref 0–0.2)
BASOPHILS NFR BLD AUTO: 0 %
BUN SERPL-SCNC: 17 MG/DL (ref 9–20)
CALCIUM SPEC-MCNC: 9 MG/DL (ref 8.4–10.2)
CHLORIDE SERPL-SCNC: 109 MMOL/L (ref 98–107)
CO2 SERPL-SCNC: 22 MMOL/L (ref 22–30)
EOSINOPHIL # BLD AUTO: 0 K/UL (ref 0–0.7)
EOSINOPHIL NFR BLD AUTO: 0 %
ERYTHROCYTE [DISTWIDTH] IN BLOOD BY AUTOMATED COUNT: 4.8 M/UL (ref 4.3–5.9)
ERYTHROCYTE [DISTWIDTH] IN BLOOD: 13.1 % (ref 11.5–15.5)
GLUCOSE SERPL-MCNC: 95 MG/DL (ref 74–99)
HCT VFR BLD AUTO: 43.1 % (ref 39–53)
HGB BLD-MCNC: 14 GM/DL (ref 13–17.5)
LYMPHOCYTES # SPEC AUTO: 2.1 K/UL (ref 1–4.8)
LYMPHOCYTES NFR SPEC AUTO: 34 %
MCH RBC QN AUTO: 29.2 PG (ref 25–35)
MCHC RBC AUTO-ENTMCNC: 32.5 G/DL (ref 31–37)
MCV RBC AUTO: 89.8 FL (ref 80–100)
MONOCYTES # BLD AUTO: 0.5 K/UL (ref 0–1)
MONOCYTES NFR BLD AUTO: 9 %
NEUTROPHILS # BLD AUTO: 3.2 K/UL (ref 1.3–7.7)
NEUTROPHILS NFR BLD AUTO: 53 %
PLATELET # BLD AUTO: 183 K/UL (ref 150–450)
POTASSIUM SERPL-SCNC: 4.3 MMOL/L (ref 3.5–5.1)
PROT SERPL-MCNC: 6 G/DL (ref 6.3–8.2)
SODIUM SERPL-SCNC: 139 MMOL/L (ref 137–145)
WBC # BLD AUTO: 6 K/UL (ref 3.8–10.6)

## 2020-03-29 RX ADMIN — BUDESONIDE SCH MG: 0.5 INHALANT ORAL at 20:54

## 2020-03-29 RX ADMIN — FAMOTIDINE SCH MG: 20 TABLET, FILM COATED ORAL at 20:38

## 2020-03-29 RX ADMIN — DONEPEZIL HYDROCHLORIDE SCH MG: 10 TABLET ORAL at 20:37

## 2020-03-29 RX ADMIN — ATORVASTATIN CALCIUM SCH MG: 10 TABLET, FILM COATED ORAL at 20:37

## 2020-03-29 RX ADMIN — LISINOPRIL SCH MG: 2.5 TABLET ORAL at 20:38

## 2020-03-29 RX ADMIN — MEMANTINE HYDROCHLORIDE SCH MG: 10 TABLET ORAL at 20:38

## 2020-03-29 RX ADMIN — ATENOLOL SCH MG: 25 TABLET ORAL at 09:01

## 2020-03-29 RX ADMIN — ISODIUM CHLORIDE PRN MG: 0.03 SOLUTION RESPIRATORY (INHALATION) at 20:54

## 2020-03-29 RX ADMIN — ISODIUM CHLORIDE PRN MG: 0.03 SOLUTION RESPIRATORY (INHALATION) at 11:07

## 2020-03-29 RX ADMIN — FAMOTIDINE SCH MG: 20 TABLET, FILM COATED ORAL at 09:02

## 2020-03-29 RX ADMIN — ASPIRIN 81 MG CHEWABLE TABLET SCH MG: 81 TABLET CHEWABLE at 09:01

## 2020-03-29 NOTE — P.HPIM
History of Present Illness


H&P Date: 03/29/20


Chief Complaint: Chest pain/[place]





This is a 80-year-old  male one of Dr. Cobos with a previous medical 

history significant for CAD post-PCI and multiple stent placement started in 

1999 with a total of 7 stents, under the care of cardiology and regular basis 

with Dr. Dias , history of hypertension and hypertensive cardiovascular disease,

diabetes mellitus type 2, COPD, hyperlipidemia, bilateral pulmonary embolism, 

left DVT and osteoarthritis, patient stated he was trying to lift a 20 pound cat

when he developed to have a significant chest pain associated with minimal 

shortness breath he has been complaining of a dry cough for quite sometime that 

has not changed over the last few weeks, he has no fever or chills apparently 

EMS was called patient was given nitroglycerin and his pain eased up, patient 

was seen in emergency department he was given aspirin 325 minute gram orally 

once every day, and because of his presentation was admitted to the hospital 

with cardiology consultation for evaluation of possible unstable angina, EKG did

not show any evidence of acute ST-T wave changes.








Review of Systems


Constitutional: Denies anorexia, Denies chronic headaches, Denies lethargy, 

Denies malaise, Denies weakness


Eyes: denies blurred vision, denies bulging eye, denies decreased vision, denies

diplopia


Ears: deny: decreased hearing


Ears, nose, mouth and throat: Denies dysphagia, Denies neck lump, Denies sore 

throat


Cardiovascular: Reports chest pain, Reports decreased exercise tolerance, 

Reports dyspnea on exertion, Reports shortness of breath, Denies 

lightheadedness, Denies rapid heart beat, Denies syncope


Respiratory: Denies congestion, Denies cough with sputum, Denies home oxygen, 

Denies sleep apnea, Denies snoring, Denies wheezing


Gastrointestinal: Denies abdominal pain, Denies bloating, Denies BRBPR, Denies 

heartburn, Denies loss of appetite, Denies melena, Denies nausea, Denies 

vomiting


Genitourinary: Reports nocturia, Denies dysuria


Musculoskeletal: Denies myalgias


Musculoskeletal: absent: ankle pain, ankle stiffness, ankle swelling, elbow 

pain, elbow stiffness, elbow swelling, foot pain, foot stiffness, foot swelling,

hand pain, hand stiffness, hand swelling, hip pain, hip stiffness, hip swelling,

knee pain, knee stiffness, knee swelling, shoulder pain, shoulder stiffness, 

shoulder swelling, wrist pain, wrist stiffness, wrist swelling


Integumentary: Denies pruritus, Denies rash


Neurological: Denies numbness, Denies weakness


Psychiatric: Denies anxiety, Denies depression


Endocrine: Denies fatigue, Denies weight change





Past Medical History


Past Medical History: Asthma, Coronary Artery Disease (CAD), COPD, Dementia, 

Diabetes Mellitus, Deep Vein Thrombosis (DVT), GERD/Reflux, Hyperlipidemia, 

Hypertension, Memory Impairment, Myocardial Infarction (MI), Osteoarthritis 

(OA), Pneumonia, Pulmonary Embolus (PE), Skin Disorder, Sleep Apnea/CPAP/BIPAP


Additional Past Medical History / Comment(s): Migraines.  KATHY PE and LT DVT 

4/2016.  Bruises easily.  Hx Kidney Stones.  Dementia.  USES CPAP.


Last Myocardial Infarction Date:: 1999 AND 2000


History of Any Multi-Drug Resistant Organisms: None Reported


Past Surgical History: Heart Catheterization With Stent


Additional Past Surgical History / Comment(s): 7 STENTS.  LT KNEE REPLACEMENT.  

LT ACHILLES TENDON REPAIR.   bilat CATARACT 7/19/16.


Past Anesthesia/Blood Transfusion Reactions: Previous Problems w/ Anesthesia


Additional Past Anesthesia/Blood Transfusion Reaction / Comment(s): DIFFICULTY 

WAKING UP.


Date of Last Stent Placement:: 2013


Past Psychological History: Anxiety, Depression, Panic Disorder


Additional Psychological History / Comment(s): SINCE 2015, GETS MILD DEPRESSION 

DURING WINTER MONTHS(SAD). Dementia


Smoking Status: Former smoker


Past Alcohol Use History: None Reported


Additional Past Alcohol Use History / Comment(s): SMOKED >40 YEARS, 3PPD, QUIT 

1989.


Past Drug Use History: None Reported





- Past Family History


  ** Mother


Family Medical History: Coronary Artery Disease (CAD)


Additional Family Medical History / Comment(s): QUAD BYPASS





  ** Brother(s)


Family Medical History: Coronary Artery Disease (CAD)





  ** Sister(s)


Family Medical History: No Reported History





  ** Daughter(s)


Family Medical History: No Reported History





  ** Son(s)


Family Medical History: No Reported History





Medications and Allergies


                                Home Medications











 Medication  Instructions  Recorded  Confirmed  Type


 


Atenolol [Tenormin] 25 mg PO QAM 09/30/14 03/28/20 History


 


Famotidine 40 mg PO BID 09/30/14 03/28/20 History


 


Lisinopril [Zestril] 2.5 mg PO HS 09/30/14 03/28/20 History


 


metFORMIN HCL [Glucophage] 500 mg PO BID-W/MEALS 09/30/14 03/28/20 History


 


Aspirin 81 mg PO QAM 03/25/16 03/28/20 History


 


Albuterol Inhaler [Ventolin Hfa 1 puff INHALATION RT-Q6H PRN 07/18/16 03/28/20 

History





Inhaler]    


 


Cyanocobalamin [Vitamin B-12] 250 mcg PO DAILY 07/18/16 03/28/20 History


 


Lovastatin [Mevacor] 40 mg PO HS 09/19/16 03/28/20 History


 


Cholecalciferol [Vitamin D3 (25 1,000 unit PO DAILY 12/14/17 03/28/20 History





Mcg = 1000 Iu)]    


 


Meloxicam 15 mg PO DAILY 12/14/17 03/28/20 History


 


Montelukast [Singulair] 10 mg PO HS 12/14/17 03/28/20 History


 


Multivitamins, Thera [Multivitamin 1 tab PO DAILY 12/14/17 03/28/20 History





(formulary)]    


 


Donepezil [Aricept] 10 mg PO HS 07/22/18 03/28/20 History


 


Memantine [Namenda] 10 mg PO BID 07/22/18 03/28/20 History


 


traZODone HCL 50 mg PO HS 06/08/19 03/28/20 History


 


Sertraline [Zoloft] 75 mg PO DAILY 01/22/20 03/28/20 History


 


Benralizumab [Fasenra] 30 mg SQ Q28D 03/28/20 03/28/20 History


 


Budesonide [Pulmicort] 0.5 mg INHALATION RT-BID 03/28/20 03/28/20 History








                                    Allergies











Allergy/AdvReac Type Severity Reaction Status Date / Time


 


No Known Allergies Allergy   Verified 03/28/20 15:38














Physical Exam


Vitals: 


                                   Vital Signs











  Temp Pulse Pulse Resp BP BP Pulse Ox


 


 03/29/20 08:58  97.1 F L   55 L  16   113/60  98


 


 03/29/20 04:00  98.1 F   63  16   114/56  97


 


 03/28/20 20:00     16   


 


 03/28/20 17:55     16   


 


 03/28/20 17:53  98.2 F   59 L  16   135/72  100


 


 03/28/20 17:43   58 L   16  114/65   98


 


 03/28/20 17:18   58 L   18  118/65   97


 


 03/28/20 15:38   57 L   18  127/61   99


 


 03/28/20 15:17   58 L   18  85/56   97


 


 03/28/20 14:56   60   18  110/59   97


 


 03/28/20 14:33     20   


 


 03/28/20 14:29  98.1 F  61   20  119/59   99








                                Intake and Output











 03/28/20 03/29/20 03/29/20





 22:59 06:59 14:59


 


Intake Total 320 20 70


 


Balance 320 20 70


 


Intake:   


 


  IV 20 20 10


 


    Invasive Line 1 20 20 10


 


  Oral 300  60


 


Other:   


 


  Voiding Method Toilet  Toilet


 


  # Voids  2 2


 


  Weight 93.939 kg 93.8 kg 














HEENT: Head is atraumatic, normocephalic, pupils were round reactive to light 

and accommodation, extraocular muscle movement were intact.





Neck: Supple, no JVP, decreased carotid upstroke bilaterally





Chest: Decreased breath sounds at the bases, few rhonchi, no expiratory wheezes,

 no chest wall tenderness, no intercostal retractions spray





Heart: First heart sound is depressed, second heart sounds normal, there is 

systolic ejection murmur 2/60 can the left sternal border.





Abdomen: Soft, non tender, non-distended, positive bowel sounds per





Extremities: No edema, no calf tenderness, dorsalis pedis +1 bilaterally.





Neurologic examination: Patient is awake alert and oriented 3, cranial nerves 

III through XII appear grossly intact.





Results


CBC & Chem 7: 


                                 03/29/20 02:42





                                 03/29/20 02:42


Labs: 


                  Abnormal Lab Results - Last 24 Hours (Table)











  03/28/20 03/28/20 03/29/20 Range/Units





  14:25 14:25 02:42 


 


D-Dimer  1.26 H    (<0.60)  mg/L FEU


 


Chloride    109 H  ()  mmol/L


 


Glucose   120 H   (74-99)  mg/dL


 


Total Protein    6.0 L  (6.3-8.2)  g/dL














Thrombosis Risk Factor Assmnt





- DVT/VTE Prophylaxis


DVT/VTE Prophylaxis: Pharmacologic Prophylaxis ordered, Mechanical Prophylaxis 

ordered





- Choose All That Apply


Each Factor Represents 1 point: Abnormal pulmonary function (COPD), Acute MI


Other Risk Factors: No


Each Risk Factor Represents 3 Points: Age 75 years or older


Other congenital or acquired thrombophilia - If yes, enter type in comment: No


Thrombosis Risk Factor Assessment Total Risk Factor Score: 5


Thrombosis Risk Factor Assessment Level: High Risk





Assessment and Plan


Assessment: 


Assessment and plan:











1.  Chest pain in patient with a history of CAD post-PCI.  Heparin drip, start 

the patient on aspirin 325 mg orally once every day, continue atenolol 25 mg 

orally once every day, continue with Lipitor 80 mg orally once every day, 

cardiac enzymes, cardiology consultation.





2.  CAD post-PCI.  Continue patient on atenolol, aspirin, statin.





3.  Hypertension and hypertensive cardiovascular disease.  Continue atenolol 25 

mg orally once every day, lisinopril 2.5 mg orally once every day.





4.  Hyperlipidemia.  Continue patient on statin.





5.  Diabetes mellitus type 2.  Continue metformin 500 mg orally twice every day,

 blood glucose before each meal and at bedtime along with a sliding scale 

insulin.





6.  Vascular dementia.  Continue Namzaric 28/10 mg orally once every day.





7.  COPD.  Continue patient on the Qvar as well as Ventolin inhaler.





8.  History of bilateral pulmonary emboli and left DVT.  Resolved.





9.  Mild cognitive impairment.  Continue Aricept and Namenda.





10.  Anxiety.  Continue Xanax 0.5 mg orally twice every day.





11.  Depressive disorder.  Continue patient on sertraline 50 mg orally once 

every day.





12.  GERD.  Continue patient on Pepcid 40 mg orally twice every day.





13.  DVT prophylaxis.  Heparin drip.





14.  GI prophylaxis.  Continue with Pepcid.

## 2020-03-29 NOTE — CONS
CONSULTATION



Glenn is a 80-year-old gentleman who was admitted to the hospital with chest pain.  He

has multiple medical problems including COPD, dyslipidemia, diabetes, and hypertension

along with dementia.  He is a poor historian and is not quite sure why he is here.

There is history of coronary artery disease and prior angioplasty but there is no way

for me to verify this at this time.  At the time of my evaluation, he appears

comfortable at rest.  Cardiac enzymes have been negative.  EKG does not reveal acute

ischemic changes.



PAST MEDICAL HISTORY:

Significant for COPD, CAD, diabetes, hypertension, dyslipidemia, dementia, pneumonia,

sleep apnea.



MEDICATIONS:

Include atenolol, Zestril, Glucophage, aspirin, albuterol, Mevacor, Aricept, Namenda.



FAMILY HISTORY:

Significant for CAD and dementia.



SOCIAL HISTORY:

Significant for smoking in the past.



REVIEW OF SYSTEMS:

HEENT:  Unremarkable.

CARDIAC:  As described above.

RESPIRATORY:  As described above.

GI:  Negative.

GENITOURINARY:  Negative.

ALLERGY/IMMUNOLOGY:  Negative.

SKIN:  Negative.

MUSCULOSKELETAL:  Significant for arthritis.

PSYCHOSOCIAL:  Negative.

DERM:  Negative.

CONSTITUTIONAL:  Negative.

ONCOLOGICAL:  Negative.

CNS:  Significant for dementia.



PHYSICAL EXAMINATION:

On exam, patient is afebrile.  Vital signs were stable.

There is no jugular venous distention.

Chest exam reveals good air entry bilaterally.

Heart exam reveals first and second heart sounds.  No gallop.  There is a systolic

murmur at the left lower sternal border.

Abdomen is soft.

Examination of the extremities did not reveal any edema.  Peripheral pulses are felt.

CNS exam did not reveal focal neurological deficits.



Three sets of cardiac enzymes are negative.  Potassium of 4.3, creatinine is 0.8.

Hemoglobin is normal at 14.  EKG shows sinus rhythm and is within normal limits.



ASSESSMENT:

1. Precordial chest pain.

2. Coronary artery disease status post multiple-vessel angioplasty.

3. Hypertension.

4. Diabetes.

5. Dyslipidemia.



PLAN:

The patient is pain free.  Given his advanced dementia, we are better off managing him

with optimal medical therapy.  I will obtain a 2D echo to document his LV function.

Hopefully, home tomorrow morning.





MMODL / IJN: [id number] / Job#: [id number]

## 2020-03-30 VITALS — HEART RATE: 68 BPM

## 2020-03-30 VITALS — TEMPERATURE: 98.2 F | SYSTOLIC BLOOD PRESSURE: 126 MMHG | DIASTOLIC BLOOD PRESSURE: 64 MMHG

## 2020-03-30 LAB
ALBUMIN SERPL-MCNC: 3.9 G/DL (ref 3.5–5)
ALP SERPL-CCNC: 52 U/L (ref 38–126)
ALT SERPL-CCNC: 32 U/L (ref 4–49)
ANION GAP SERPL CALC-SCNC: 9 MMOL/L
AST SERPL-CCNC: 37 U/L (ref 17–59)
BASOPHILS # BLD AUTO: 0 K/UL (ref 0–0.2)
BASOPHILS NFR BLD AUTO: 0 %
BUN SERPL-SCNC: 17 MG/DL (ref 9–20)
CALCIUM SPEC-MCNC: 9.3 MG/DL (ref 8.4–10.2)
CHLORIDE SERPL-SCNC: 107 MMOL/L (ref 98–107)
CO2 SERPL-SCNC: 23 MMOL/L (ref 22–30)
EOSINOPHIL # BLD AUTO: 0 K/UL (ref 0–0.7)
EOSINOPHIL NFR BLD AUTO: 0 %
ERYTHROCYTE [DISTWIDTH] IN BLOOD BY AUTOMATED COUNT: 4.9 M/UL (ref 4.3–5.9)
ERYTHROCYTE [DISTWIDTH] IN BLOOD: 13.1 % (ref 11.5–15.5)
GLUCOSE SERPL-MCNC: 96 MG/DL (ref 74–99)
HCT VFR BLD AUTO: 44.2 % (ref 39–53)
HGB BLD-MCNC: 14.2 GM/DL (ref 13–17.5)
LYMPHOCYTES # SPEC AUTO: 1.6 K/UL (ref 1–4.8)
LYMPHOCYTES NFR SPEC AUTO: 27 %
MCH RBC QN AUTO: 28.9 PG (ref 25–35)
MCHC RBC AUTO-ENTMCNC: 32 G/DL (ref 31–37)
MCV RBC AUTO: 90.3 FL (ref 80–100)
MONOCYTES # BLD AUTO: 0.6 K/UL (ref 0–1)
MONOCYTES NFR BLD AUTO: 9 %
NEUTROPHILS # BLD AUTO: 3.7 K/UL (ref 1.3–7.7)
NEUTROPHILS NFR BLD AUTO: 62 %
PLATELET # BLD AUTO: 190 K/UL (ref 150–450)
POTASSIUM SERPL-SCNC: 4.4 MMOL/L (ref 3.5–5.1)
PROT SERPL-MCNC: 6.2 G/DL (ref 6.3–8.2)
SODIUM SERPL-SCNC: 139 MMOL/L (ref 137–145)
WBC # BLD AUTO: 6 K/UL (ref 3.8–10.6)

## 2020-03-30 RX ADMIN — FAMOTIDINE SCH MG: 20 TABLET, FILM COATED ORAL at 09:21

## 2020-03-30 RX ADMIN — ATENOLOL SCH MG: 25 TABLET ORAL at 09:22

## 2020-03-30 RX ADMIN — BUDESONIDE SCH MG: 0.5 INHALANT ORAL at 08:50

## 2020-03-30 RX ADMIN — ASPIRIN 81 MG CHEWABLE TABLET SCH MG: 81 TABLET CHEWABLE at 09:24

## 2020-03-30 RX ADMIN — ISODIUM CHLORIDE PRN MG: 0.03 SOLUTION RESPIRATORY (INHALATION) at 08:50

## 2020-03-30 RX ADMIN — MEMANTINE HYDROCHLORIDE SCH MG: 10 TABLET ORAL at 09:22

## 2020-03-30 NOTE — P.PN
Subjective


Progress Note Date: 03/30/20


This is a 80-year-old gentleman admitted to the hospital with symptoms of 

atypical chest pain, patient has multiple medical problems including COPD, 

hyperlipidemia, diabetes, hypertension, dementia.  He overall is a poor 

historian.  Patient was seen in consultation by Dr. Dias, his decision was to 

optimize medical therapy.  On examination this morning, the patient states that 

his chest pain is completely gone.  His echocardiogram with Doppler study was 

performed which revealed a normal left ventricular systolic function.








Objective





- Vital Signs


Vital signs: 


                                   Vital Signs











Temp  98.2 F   03/30/20 09:11


 


Pulse  68   03/30/20 09:11


 


Resp  20   03/30/20 09:11


 


BP  126/64   03/30/20 09:11


 


Pulse Ox  96   03/30/20 09:11








                                 Intake & Output











 03/29/20 03/30/20 03/30/20





 18:59 06:59 18:59


 


Intake Total 1610  604


 


Balance 1610  604


 


Weight  93.5 kg 


 


Intake:   


 


  IV 30  10


 


    Invasive Line 1 30  10


 


  Oral 1580  594


 


Other:   


 


  Voiding Method Toilet Toilet Toilet


 


  # Voids 3 1 2














- Exam


PHYSICAL EXAMINATION: 





GENERAL: 80-year-old  gentleman in no acute distress at the time of my 

examination 





HEENT: Head is atraumatic, normocephalic.  Pupils equal, round.  Sclera anict

maged. Conjunctiva are clear.  Mucous membranes of the mouth are moist.  Neck is 

supple.  There is no elevated jugular venous pressure.  No carotid  bruit is 

heard.





HEART EXAMINATION: Heart S1-S2 a systolic murmur is heard





CHEST EXAMINATION: Lungs are clear to auscultation and precussion. No chest wall

tenderness is noted on palpation or with deep breathing.





ABDOMEN:  Soft, nontender. Bowel sounds are heard. No organomegaly noted.


 


EXTREMITIES: 2+ peripheral pulses with no evidence of peripheral edema and no 

calf tenderness noted.





NEUROLOGIC patient is awake, alert and oriented 1.]


 


.


 











- Labs


CBC & Chem 7: 


                                 03/30/20 05:10





                                 03/30/20 05:10


Labs: 


                  Abnormal Lab Results - Last 24 Hours (Table)











  03/30/20 Range/Units





  05:10 


 


Total Protein  6.2 L  (6.3-8.2)  g/dL














Assessment and Plan


Plan: 


Assessment and plan


#1 atypical chest pain, troponins negative 3


#2 coronary artery disease status post multivessel angioplasty


#3 hypertension


#4 diabetes


#5 hyperlipidemia


#6 advanced dementia





Plan


From cardiology's perspective, we would recommend to continue the patient on his

current medications.  He may be able to be discharged home from our perspective.

 We will follow him along on an as-needed basis only, please don't hesitate to 

call with any questions.





DNP note has been reviewed, I agree with a documented findings and plan of care.

 Patient was seen and examined.

## 2020-03-30 NOTE — P.DS
Providers


Date of admission: 


03/28/20 17:14





Expected date of discharge: 03/30/20


Attending physician: 


Alex Goodrich





Consults: 





                                        





03/29/20 10:36


Consult Physician Routine 


   Consulting Provider: Angel Luis Acosta


   Consult Reason/Comments: chest pain


   Do you want consulting provider notified?: Yes











Primary care physician: 


Elisa Cobos





[facility] Course: 





This is a 80-year-old  male one of Dr. Cobos with a previous medical 

history significant for CAD post-PCI and multiple stent placement started in 

1999 with a total of 7 stents, under the care of cardiology and regular basis 

with Dr. Dias , history of hypertension and hypertensive cardiovascular disease,

diabetes mellitus type 2, COPD, hyperlipidemia, bilateral pulmonary embolism, 

left DVT and osteoarthritis, patient stated he was trying to lift a 20 pound cat

when he developed to have a significant chest pain associated with minimal 

shortness breath he has been complaining of a dry cough for quite sometime that 

has not changed over the last few weeks, he has no fever or chills apparently 

EMS was called patient was given nitroglycerin and his pain eased up, patient 

was seen in emergency department he was given aspirin 325 minute gram orally 

once every day, and because of his presentation was admitted to the hospital 

with cardiology consultation for evaluation of possible unstable angina, EKG did

not show any evidence of acute ST-T wave changes.





3/30: Patient has been seen by cardiology and recommends medical management.  

Patient denies having any chest pain or shortness of breath.  Echocardiogram 

reveals a normal left ventricular systolic function.  Troponins have been 

negative on 3 draws.  Repeat CBC and CMP within normal limits.  Patient's been 

afebrile, heart rate 68, blood pressure 126/64, pulse ox 96% on room air.  

Patient will be discharged home today in stable condition.





Discharge diagnoses:


1.  Chest pain in patient with a history of CAD post-PCI.  Acute coronary 

syndrome ruled out by cardiology.


2.  History of CAD post-PCI.  


3.  Hypertension and hypertensive cardiovascular disease.  


4.  Hyperlipidemia.  


5.  Diabetes mellitus type 2.  


6.  Vascular dementia.  


7.  COPD.  


8.  History of bilateral pulmonary emboli and left DVT.  Resolved.


9.  Mild cognitive impairment. 


10.  Generalized anxiety disorder.  


11.  Recurrent depression.  


12.  GERD. 





Discharge plan: Home





Impression and plan of care have been directed as dictated by the signing 

physician.  Sharon Walker nurse practitioner acting as scribe for signing 

physician.





Patient Condition at Discharge: Good





Plan - Discharge Summary


Discharge Rx Participant: No


New Discharge Prescriptions: 


New


   Albuterol Nebulized [Ventolin Nebulized] 2.5 mg INHALATION RT-Q6H #120 ml





Continue


   metFORMIN HCL [Glucophage] 500 mg PO BID-W/MEALS


   Lisinopril [Zestril] 2.5 mg PO HS


   Famotidine 40 mg PO BID


   Atenolol [Tenormin] 25 mg PO QAM


   Aspirin 81 mg PO QAM


   Albuterol Inhaler [Ventolin Hfa Inhaler] 1 puff INHALATION RT-Q6H PRN


     PRN Reason: Shortness Of Breath


   Cyanocobalamin [Vitamin B-12] 250 mcg PO DAILY


   Lovastatin [Mevacor] 40 mg PO HS


   Multivitamins, Thera [Multivitamin (formulary)] 1 tab PO DAILY


   Montelukast [Singulair] 10 mg PO HS


   Cholecalciferol [Vitamin D3 (25 Mcg = 1000 Iu)] 1,000 unit PO DAILY


   Meloxicam 15 mg PO DAILY


   Donepezil [Aricept] 10 mg PO HS


   Memantine [Namenda] 10 mg PO BID


   traZODone HCL 50 mg PO HS


   Sertraline [Zoloft] 75 mg PO DAILY


   Budesonide [Pulmicort] 0.5 mg INHALATION RT-BID


   Benralizumab [Fasenra] 30 mg SQ Q28D


Discharge Medication List





Atenolol [Tenormin] 25 mg PO QAM 09/30/14 [History]


Famotidine 40 mg PO BID 09/30/14 [History]


Lisinopril [Zestril] 2.5 mg PO HS 09/30/14 [History]


metFORMIN HCL [Glucophage] 500 mg PO BID-W/MEALS 09/30/14 [History]


Aspirin 81 mg PO QAM 03/25/16 [History]


Albuterol Inhaler [Ventolin Hfa Inhaler] 1 puff INHALATION RT-Q6H PRN 07/18/16 

[History]


Cyanocobalamin [Vitamin B-12] 250 mcg PO DAILY 07/18/16 [History]


Lovastatin [Mevacor] 40 mg PO HS 09/19/16 [History]


Cholecalciferol [Vitamin D3 (25 Mcg = 1000 Iu)] 1,000 unit PO DAILY 12/14/17 

[History]


Meloxicam 15 mg PO DAILY 12/14/17 [History]


Montelukast [Singulair] 10 mg PO HS 12/14/17 [History]


Multivitamins, Thera [Multivitamin (formulary)] 1 tab PO DAILY 12/14/17 

[History]


Donepezil [Aricept] 10 mg PO HS 07/22/18 [History]


Memantine [Namenda] 10 mg PO BID 07/22/18 [History]


traZODone HCL 50 mg PO HS 06/08/19 [History]


Sertraline [Zoloft] 75 mg PO DAILY 01/22/20 [History]


Benralizumab [Fasenra] 30 mg SQ Q28D 03/28/20 [History]


Budesonide [Pulmicort] 0.5 mg INHALATION RT-BID 03/28/20 [History]


Albuterol Nebulized [Ventolin Nebulized] 2.5 mg INHALATION RT-Q6H #120 ml 

03/30/20 [Rx]








Follow up Appointment(s)/Referral(s): 


Elisa Cobos MD [Primary Care Provider] - 1-2 days (Spoke to .  

Office will call with appointment time)


Angel Luis Acosta MD [STAFF PHYSICIAN] - 04/14/20 3:15 pm (Tuesday)


Patient Instructions/Handouts:  Chest Pain (ED)


Activity/Diet/Wound Care/Special Instructions: 


 


Discharge Disposition: HOME SELF-CARE

## 2020-10-20 ENCOUNTER — HOSPITAL ENCOUNTER (OUTPATIENT)
Dept: HOSPITAL 47 - ORPAIN | Age: 80
Discharge: HOME | End: 2020-10-20
Attending: ANESTHESIOLOGY
Payer: COMMERCIAL

## 2020-10-20 VITALS — TEMPERATURE: 97.9 F

## 2020-10-20 VITALS — DIASTOLIC BLOOD PRESSURE: 73 MMHG | HEART RATE: 65 BPM | SYSTOLIC BLOOD PRESSURE: 150 MMHG | RESPIRATION RATE: 20 BRPM

## 2020-10-20 VITALS — BODY MASS INDEX: 28.7 KG/M2

## 2020-10-20 DIAGNOSIS — G89.29: Primary | ICD-10-CM

## 2020-10-20 DIAGNOSIS — E11.9: ICD-10-CM

## 2020-10-20 DIAGNOSIS — Z79.82: ICD-10-CM

## 2020-10-20 DIAGNOSIS — M54.81: ICD-10-CM

## 2020-10-20 LAB — GLUCOSE BLD-MCNC: 97 MG/DL (ref 75–99)

## 2020-10-20 PROCEDURE — 64405 NJX AA&/STRD GR OCPL NRV: CPT

## 2020-10-20 NOTE — P.PCN
Date of Procedure: 10/20/20


Procedure(s) Performed: 


Preoperative diagnoses= 1- Greater occipital neuralgia





Postoperative diagnoses= same as preoperative diagnosis.





Procedure= Bilateral  Greater occipital nerve block


Anesthesia= none


Estimated blood loss=minimal.


Procedure indication= the patient had a history of severe chronic neck  pain 

,and headache, diagnosed with occipital  neuralgia exam was positive for severe 

tenderness over the occipital nerve bilaterally, she will be a good candidate 

occipital nerve block, patient failed conservative management





Procedure description= the patient was seen and identified in the preoperative 

holding area, risks and benefits and alternative of the procedure and possible 

complications discussed with the patient, and he agreed with the preceding, 

patient signed the consent, an IV was started, and vital signs were monitored 

and were stable throughout the procedure, patient was placed in the sitting  

position or table and the neck area was prepped and draped with a sterile 

fashion, vital signs were closely monitored during the procedure, 25-gauge 

needle advanced  1 inch lateral to the occipital protuberance on the right side,

at the location of  the right occipital nerve , then after negative aspiration 

for heme and CSF and there was no paresthesia during the injection, 5 ml of 

Robivacaine  0.5% and 20 mg of depo-medrol injected after negative aspiration, 

the needle removed, and the entire same procedure was repeated for the left 

Greater occipital nerve.


Patient tolerated the procedure well without any complication,


The patient returned to supine position after the back was cleaned and a Band-

Aid applied, the patient transported to recovery room in stable condition and he

was monitored for 30 minutes before he was discharged home and then patient was 

reexamined before going home and patient was discharged in stable condition and 

patient will follow up with the pain clinic in a few weeks.

## 2021-03-01 ENCOUNTER — HOSPITAL ENCOUNTER (OUTPATIENT)
Dept: HOSPITAL 47 - LABWHC1 | Age: 81
Discharge: HOME | End: 2021-03-01
Attending: FAMILY MEDICINE
Payer: COMMERCIAL

## 2021-03-01 DIAGNOSIS — J45.30: ICD-10-CM

## 2021-03-01 DIAGNOSIS — E11.65: Primary | ICD-10-CM

## 2021-03-01 LAB
ALBUMIN SERPL-MCNC: 4.7 G/DL (ref 3.8–4.9)
ALBUMIN/GLOB SERPL: 3.13 G/DL (ref 1.6–3.17)
ALP SERPL-CCNC: 64 U/L (ref 41–126)
ALT SERPL-CCNC: 44 U/L (ref 10–49)
ANION GAP SERPL CALC-SCNC: 7.4 MMOL/L (ref 4–12)
AST SERPL-CCNC: 34 U/L (ref 14–35)
BASOPHILS # BLD AUTO: 0.02 X 10*3/UL (ref 0–0.1)
BASOPHILS NFR BLD AUTO: 0.3 %
BUN SERPL-SCNC: 24 MG/DL (ref 9–27)
BUN/CREAT SERPL: 24 RATIO (ref 12–20)
CALCIUM SPEC-MCNC: 9.3 MG/DL (ref 8.7–10.3)
CHLORIDE SERPL-SCNC: 108 MMOL/L (ref 96–109)
CHOLEST SERPL-MCNC: 123 MG/DL (ref 0–200)
CK SERPL-CCNC: 261 U/L (ref 35–257)
CO2 SERPL-SCNC: 25.6 MMOL/L (ref 21.6–31.8)
EOSINOPHIL # BLD AUTO: 0 X 10*3/UL (ref 0.04–0.35)
EOSINOPHIL NFR BLD AUTO: 0 %
ERYTHROCYTE [DISTWIDTH] IN BLOOD BY AUTOMATED COUNT: 4.97 X 10*6/UL (ref 4.4–5.6)
ERYTHROCYTE [DISTWIDTH] IN BLOOD: 13.2 % (ref 11.5–14.5)
GLOBULIN SER CALC-MCNC: 1.5 G/DL (ref 1.6–3.3)
GLUCOSE SERPL-MCNC: 95 MG/DL (ref 70–110)
HBA1C MFR BLD: 5.7 % (ref 4–6)
HCT VFR BLD AUTO: 46.5 % (ref 39.6–50)
HDLC SERPL-MCNC: 33 MG/DL (ref 40–60)
HGB BLD-MCNC: 14.6 G/DL (ref 13–17)
LDLC SERPL CALC-MCNC: 49.2 MG/DL (ref 0–131)
LYMPHOCYTES # SPEC AUTO: 1.99 X 10*3/UL (ref 0.9–5)
LYMPHOCYTES NFR SPEC AUTO: 31.1 %
MCH RBC QN AUTO: 29.4 PG (ref 27–32)
MCHC RBC AUTO-ENTMCNC: 31.4 G/DL (ref 32–37)
MCV RBC AUTO: 93.6 FL (ref 80–97)
MONOCYTES # BLD AUTO: 0.72 X 10*3/UL (ref 0.2–1)
MONOCYTES NFR BLD AUTO: 11.3 %
NEUTROPHILS # BLD AUTO: 3.62 X 10*3/UL (ref 1.8–7.7)
NEUTROPHILS NFR BLD AUTO: 56.7 %
PLATELET # BLD AUTO: 193 X 10*3/UL (ref 140–440)
POTASSIUM SERPL-SCNC: 4.7 MMOL/L (ref 3.5–5.5)
PROT SERPL-MCNC: 6.2 G/DL (ref 6.2–8.2)
SODIUM SERPL-SCNC: 141 MMOL/L (ref 135–145)
T4 FREE SERPL-MCNC: 1.2 NG/DL (ref 0.8–1.8)
TRIGL SERPL-MCNC: 204 MG/DL (ref 0–149)
VIT B12 SERPL-MCNC: 578 PG/ML (ref 200–944)
VLDLC SERPL CALC-MCNC: 40.8 MG/DL (ref 5–40)
WBC # BLD AUTO: 6.39 X 10*3/UL (ref 4.5–10)

## 2021-03-01 PROCEDURE — 82550 ASSAY OF CK (CPK): CPT

## 2021-03-01 PROCEDURE — 83880 ASSAY OF NATRIURETIC PEPTIDE: CPT

## 2021-03-01 PROCEDURE — 82570 ASSAY OF URINE CREATININE: CPT

## 2021-03-01 PROCEDURE — 82607 VITAMIN B-12: CPT

## 2021-03-01 PROCEDURE — 82043 UR ALBUMIN QUANTITATIVE: CPT

## 2021-03-01 PROCEDURE — 80053 COMPREHEN METABOLIC PANEL: CPT

## 2021-03-01 PROCEDURE — 84439 ASSAY OF FREE THYROXINE: CPT

## 2021-03-01 PROCEDURE — 83036 HEMOGLOBIN GLYCOSYLATED A1C: CPT

## 2021-03-01 PROCEDURE — 84443 ASSAY THYROID STIM HORMONE: CPT

## 2021-03-01 PROCEDURE — 80061 LIPID PANEL: CPT

## 2021-03-01 PROCEDURE — 85025 COMPLETE CBC W/AUTO DIFF WBC: CPT

## 2021-03-01 PROCEDURE — 36415 COLL VENOUS BLD VENIPUNCTURE: CPT

## 2021-03-16 ENCOUNTER — HOSPITAL ENCOUNTER (OUTPATIENT)
Dept: HOSPITAL 47 - RADMRIMAIN | Age: 81
End: 2021-03-16
Attending: FAMILY MEDICINE
Payer: COMMERCIAL

## 2021-03-16 DIAGNOSIS — M17.11: ICD-10-CM

## 2021-03-16 DIAGNOSIS — M23.321: Primary | ICD-10-CM

## 2021-03-16 DIAGNOSIS — M23.341: ICD-10-CM

## 2021-03-16 DIAGNOSIS — M23.611: ICD-10-CM

## 2021-03-17 NOTE — MR
EXAMINATION TYPE: MR knee RT wo con

 

DATE OF EXAM: 3/16/2021

 

COMPARISON: None.

 

HISTORY: Pain and swelling in Right Knee for several Years

 

TECHNIQUE: Multiplanar, multisequence imaging of the right knee is performed without IV contrast.

 

FINDINGS:

 

MEDIAL MENISCUS: Marked deformity due to majority of central medial meniscus with some abnormal linea
r signal extending to superior articular surface posterior horn. Some linear increased signal anterio
r horn which is anteriorly deviated.

 

LATERAL MENISCUS: Horizontal and vertical increased signal anterior horn lateral meniscus extending t
o inferior articular surface.

 

CRUCIATE LIGAMENTS: The posterior cruciate ligament is intact and unremarkable. There is marked abnor
mal signal mid segment with nonvisualization of the distal anterior cruciate ligament. Full-thickness
 tear suspected.

 

COLLATERAL LIGAMENTS: The medial collateral ligament and lateral collateral ligament complex are inta
ct and unremarkable.

 

EXTENSOR MECHANISM: Visualized quadriceps and patellar tendons are intact.

 

EFFUSION:  Moderate size suprapatellar joint effusion.

 

POPLITEAL CYST:  No popliteal/baker cyst.

 

TRICOMPARTMENT SPACES: Severe patellofemoral joint space narrowing with moderate to severe spurring. 
Similar findings noted medial tibiofemoral compartment. More mild to moderate narrowing and spurring 
lateral tibiofemoral compartment.

 

CARTILAGE: Severe full-thickness cartilaginous loss medial tibiofemoral compartment. Some reactive ar
eas of low T1 and increased T2 signal in the distal medial femoral condyle.

 

BONE MARROW SIGNAL: No focal abnormal marrow signal is appreciated.

 

OTHER:  No additional significant abnormality is appreciated.

 

IMPRESSION:

1. Advanced degenerative changes patellofemoral and medial tibiofemoral compartment as detailed above
.

2. Significant full-thickness tear majority of medial meniscus including body extending into posterio
r horn.

3. Full-thickness tear anterior horn lateral meniscus.

4. Moderate suprapatellar joint effusion.

5. Complete ACL tear.

## 2021-08-05 ENCOUNTER — HOSPITAL ENCOUNTER (OUTPATIENT)
Dept: HOSPITAL 47 - LABWHC1 | Age: 81
Discharge: HOME | End: 2021-08-05
Attending: FAMILY MEDICINE
Payer: COMMERCIAL

## 2021-08-05 DIAGNOSIS — I25.10: Primary | ICD-10-CM

## 2021-08-05 PROCEDURE — 36415 COLL VENOUS BLD VENIPUNCTURE: CPT

## 2021-08-05 PROCEDURE — 83880 ASSAY OF NATRIURETIC PEPTIDE: CPT

## 2022-09-12 ENCOUNTER — HOSPITAL ENCOUNTER (OUTPATIENT)
Dept: HOSPITAL 47 - RADUSWWP | Age: 82
Discharge: HOME | End: 2022-09-12
Attending: FAMILY MEDICINE
Payer: COMMERCIAL

## 2022-09-12 DIAGNOSIS — K21.9: Primary | ICD-10-CM

## 2022-09-12 PROCEDURE — 74220 X-RAY XM ESOPHAGUS 1CNTRST: CPT

## 2022-09-12 NOTE — FL
EXAMINATION TYPE: FL barium swallow

 

DATE OF EXAM: 9/12/2022 11:56 AM

 

COMPARISON: None

 

CLINICAL INDICATION:Male, 82 years old with history of R13.14 DYSPHAGIA, PHARYNGOESOPHAGEAL PHASE; PH
H,

 

TECHNIQUE: The procedure was explained and patient history elicited.   All patient questions were ans
wered prior to start of procedure. Multiple spot fluoroscopic images of the esophagus were obtained a
fter the oral ingestion of liquid barium as the contrast agent.  Fluoroscopic time: 10 seconds

Fluoroscopic images: 155

 

FINDINGS: 

Limited examination due to patient's ability to follow commands.

 

The esophagus demonstrates normal primary and secondary peristalsis.  The esophageal mucosa is smooth
 without evidence of focal stricture, ulceration, or abnormal outpouching. Contrast easily flows thro
ugh the esophagus into the stomach. Mild amount of gastroesophageal reflux demonstrated.

 

IMPRESSION:

Gastroesophageal reflux.

## 2022-12-09 ENCOUNTER — HOSPITAL ENCOUNTER (EMERGENCY)
Dept: HOSPITAL 47 - EC | Age: 82
Discharge: HOME | End: 2022-12-09
Payer: COMMERCIAL

## 2022-12-09 VITALS — SYSTOLIC BLOOD PRESSURE: 160 MMHG | TEMPERATURE: 98.1 F | DIASTOLIC BLOOD PRESSURE: 73 MMHG | HEART RATE: 66 BPM

## 2022-12-09 VITALS — RESPIRATION RATE: 18 BRPM

## 2022-12-09 DIAGNOSIS — Z79.84: ICD-10-CM

## 2022-12-09 DIAGNOSIS — K21.9: ICD-10-CM

## 2022-12-09 DIAGNOSIS — I10: ICD-10-CM

## 2022-12-09 DIAGNOSIS — E78.5: ICD-10-CM

## 2022-12-09 DIAGNOSIS — E11.9: ICD-10-CM

## 2022-12-09 DIAGNOSIS — F32.A: ICD-10-CM

## 2022-12-09 DIAGNOSIS — Z87.891: ICD-10-CM

## 2022-12-09 DIAGNOSIS — F41.9: ICD-10-CM

## 2022-12-09 DIAGNOSIS — Z86.718: ICD-10-CM

## 2022-12-09 DIAGNOSIS — I25.10: ICD-10-CM

## 2022-12-09 DIAGNOSIS — J44.9: ICD-10-CM

## 2022-12-09 DIAGNOSIS — Z79.82: ICD-10-CM

## 2022-12-09 DIAGNOSIS — I25.2: ICD-10-CM

## 2022-12-09 DIAGNOSIS — M19.90: ICD-10-CM

## 2022-12-09 DIAGNOSIS — R10.9: Primary | ICD-10-CM

## 2022-12-09 DIAGNOSIS — Z79.899: ICD-10-CM

## 2022-12-09 LAB
ALBUMIN SERPL-MCNC: 4.2 G/DL (ref 3.5–5)
ALP SERPL-CCNC: 93 U/L (ref 38–126)
ALT SERPL-CCNC: 36 U/L (ref 4–49)
AMYLASE SERPL-CCNC: 84 U/L (ref 30–110)
ANION GAP SERPL CALC-SCNC: 8 MMOL/L
APTT BLD: 23.6 SEC (ref 22–30)
AST SERPL-CCNC: 42 U/L (ref 17–59)
BASOPHILS # BLD AUTO: 0 K/UL (ref 0–0.2)
BASOPHILS NFR BLD AUTO: 0 %
BUN SERPL-SCNC: 20 MG/DL (ref 9–20)
CALCIUM SPEC-MCNC: 9.4 MG/DL (ref 8.4–10.2)
CHLORIDE SERPL-SCNC: 107 MMOL/L (ref 98–107)
CO2 SERPL-SCNC: 25 MMOL/L (ref 22–30)
EOSINOPHIL # BLD AUTO: 0 K/UL (ref 0–0.7)
EOSINOPHIL NFR BLD AUTO: 0 %
ERYTHROCYTE [DISTWIDTH] IN BLOOD BY AUTOMATED COUNT: 4.6 M/UL (ref 4.3–5.9)
ERYTHROCYTE [DISTWIDTH] IN BLOOD: 12.8 % (ref 11.5–15.5)
GLUCOSE SERPL-MCNC: 87 MG/DL (ref 74–99)
HCT VFR BLD AUTO: 41.3 % (ref 39–53)
HGB BLD-MCNC: 13.9 GM/DL (ref 13–17.5)
INR PPP: 1 (ref ?–1.2)
LIPASE SERPL-CCNC: 266 U/L (ref 23–300)
LYMPHOCYTES # SPEC AUTO: 1.7 K/UL (ref 1–4.8)
LYMPHOCYTES NFR SPEC AUTO: 25 %
MCH RBC QN AUTO: 30.2 PG (ref 25–35)
MCHC RBC AUTO-ENTMCNC: 33.6 G/DL (ref 31–37)
MCV RBC AUTO: 90 FL (ref 80–100)
MONOCYTES # BLD AUTO: 0.6 K/UL (ref 0–1)
MONOCYTES NFR BLD AUTO: 8 %
NEUTROPHILS # BLD AUTO: 4.4 K/UL (ref 1.3–7.7)
NEUTROPHILS NFR BLD AUTO: 64 %
PH UR: 5.5 [PH] (ref 5–8)
PLATELET # BLD AUTO: 186 K/UL (ref 150–450)
POTASSIUM SERPL-SCNC: 4.5 MMOL/L (ref 3.5–5.1)
PROT SERPL-MCNC: 6.5 G/DL (ref 6.3–8.2)
PT BLD: 10.3 SEC (ref 9–12)
SODIUM SERPL-SCNC: 140 MMOL/L (ref 137–145)
SP GR UR: 1.02 (ref 1–1.03)
UROBILINOGEN UR QL STRIP: <2 MG/DL (ref ?–2)
WBC # BLD AUTO: 6.8 K/UL (ref 3.8–10.6)

## 2022-12-09 PROCEDURE — 84484 ASSAY OF TROPONIN QUANT: CPT

## 2022-12-09 PROCEDURE — 81003 URINALYSIS AUTO W/O SCOPE: CPT

## 2022-12-09 PROCEDURE — 82150 ASSAY OF AMYLASE: CPT

## 2022-12-09 PROCEDURE — 85610 PROTHROMBIN TIME: CPT

## 2022-12-09 PROCEDURE — 80053 COMPREHEN METABOLIC PANEL: CPT

## 2022-12-09 PROCEDURE — 71046 X-RAY EXAM CHEST 2 VIEWS: CPT

## 2022-12-09 PROCEDURE — 83690 ASSAY OF LIPASE: CPT

## 2022-12-09 PROCEDURE — 36415 COLL VENOUS BLD VENIPUNCTURE: CPT

## 2022-12-09 PROCEDURE — 74177 CT ABD & PELVIS W/CONTRAST: CPT

## 2022-12-09 PROCEDURE — 85730 THROMBOPLASTIN TIME PARTIAL: CPT

## 2022-12-09 PROCEDURE — 85025 COMPLETE CBC W/AUTO DIFF WBC: CPT

## 2022-12-09 PROCEDURE — 93005 ELECTROCARDIOGRAM TRACING: CPT

## 2022-12-09 PROCEDURE — 99285 EMERGENCY DEPT VISIT HI MDM: CPT

## 2022-12-09 NOTE — ED
General Adult HPI





- General


Chief complaint: Chest Pain


Stated complaint: chest pain


Time Seen by Provider: 12/09/22 11:40


Source: patient, EMS, RN notes reviewed, old records reviewed (Previous 

admission)


Mode of arrival: EMS


Limitations: altered mental status, physical limitation





- History of Present Illness


Initial comments: 





Patient is in a phasic 82-year-old male presenting to the emergency department 

with concern for possible chest discomfort.  Patient is a phasic and provides no

significant history.  Patient was at a  facility when patient was putting

his hand at his left side of chest and appear uncomfortable.  EMS stated they 

did witness the same thing on original assessment however none since that time. 

Caretaker does arrive and confirms history.





- Related Data


                                Home Medications











 Medication  Instructions  Recorded  Confirmed


 


Famotidine 40 mg PO BID 09/30/14 12/09/22


 


atenoloL [Tenormin] 25 mg PO DAILY 09/30/14 12/09/22


 


lisinopriL [Zestril] 2.5 mg PO HS 09/30/14 12/09/22


 


metFORMIN HCL [Glucophage] 500 mg PO HS 09/30/14 12/09/22


 


Aspirin 81 mg PO DAILY 03/25/16 12/09/22


 


Albuterol Inhaler [Ventolin Hfa 1 puff INHALATION RT-Q6H PRN 07/18/16 12/09/22





Inhaler]   


 


Cyanocobalamin [Vitamin B-12] 250 mcg PO DAILY 07/18/16 12/09/22


 


Lovastatin [Mevacor] 40 mg PO HS 09/19/16 12/09/22


 


Meloxicam 15 mg PO DAILY 12/14/17 12/09/22


 


Montelukast [Singulair] 10 mg PO HS 12/14/17 12/09/22


 


Multivitamins, Thera [Multivitamin 1 tab PO DAILY 12/14/17 12/09/22





(formulary)]   


 


Donepezil [Aricept] 10 mg PO HS 07/22/18 12/09/22


 


Memantine [Namenda] 10 mg PO BID 07/22/18 12/09/22


 


traZODone HCL 50 mg PO HS 06/08/19 12/09/22


 


Sertraline [Zoloft] 75 mg PO HS 01/22/20 12/09/22


 


Benralizumab [Fasenra] 30 mg SQ Q56D 03/28/20 12/09/22


 


Butalbital/Acetaminophen 50-325mg 1 - 2 tab PO Q4H PRN 08/24/22 12/09/22


 


Carbidopa-Levodopa  mg 1 tab PO TID 08/24/22 12/09/22





[Sinemet  mg]   


 


Ipratropium-Albuterol Nebulize 3 ml INHALATION RT-QID PRN 08/24/22 12/09/22





[Duoneb 0.5 mg-3 mg/3 ml Soln]   


 


Vit C/E/Zn/Coppr/Lutein/Zeaxan 1 cap PO BID 08/24/22 12/09/22





[Preservision Areds 2 Softgel]   


 


busPIRone HCl [Buspar] 5 mg PO BID 08/24/22 12/09/22


 


modafiniL [Provigil] 100 mg PO DAILY 08/24/22 12/09/22


 


Cholecalciferol [Vitamin D3 (25 25 mcg PO DAILY 12/09/22 12/09/22





Mcg = 1000 Iu)]   


 


traMADol HCL 25 mg PO HS 12/09/22 12/09/22








                                  Previous Rx's











 Medication  Instructions  Recorded


 


Isosorbide Mononitrate ER [Imdur] 30 mg PO DAILY 30 Days #30 tab 08/25/22


 


Nitroglycerin Sl Tabs [Nitrostat] 0.4 mg SUBLINGUAL Q5M PRN #30 tab 08/25/22











                                    Allergies











Allergy/AdvReac Type Severity Reaction Status Date / Time


 


No Known Allergies Allergy   Verified 12/09/22 13:58














Review of Systems


ROS Statement: 


Those systems with pertinent positive or pertinent negative responses have been 

documented in the HPI.





ROS Other: All systems not noted in ROS Statement are negative.


Limitations: ROS unobtainable due to patients medical condition





Past Medical History


Past Medical History: Asthma, Coronary Artery Disease (CAD), Chest Pain / 

Angina, COPD, Dementia, Diabetes Mellitus, Deep Vein Thrombosis (DVT), 

GERD/Reflux, Hyperlipidemia, Hypertension, Memory Impairment, Myocardial Infarct

ion (MI), Osteoarthritis (OA), Pneumonia, Pulmonary Embolus (PE), Skin Disorder,

 Sleep Apnea/CPAP/BIPAP


Additional Past Medical History / Comment(s): Chronic Migraines.  Hx KATHY PE, LT 

DVT 4/2016.  Bruises easily.  Hx Kidney Stones. USES CPAP.  Sees Dr Patel, 

DEMENTIA, RECENT OVERNIGHT STAY AT [facility] FOR TOXIC METABOLIC ENCEPHALOPATHY


Last Myocardial Infarction Date:: 1999 AND 2000


History of Any Multi-Drug Resistant Organisms: None Reported


Past Surgical History: Heart Catheterization With Stent, Joint Replacement


Additional Past Surgical History / Comment(s): 7 STENTS.  LT KNEE REPLACEMENT.  

LT ACHILLES TENDON REPAIR.   bilat CATARACT 7/19/16.  Pain proc


Past Anesthesia/Blood Transfusion Reactions: Previous Problems w/ Anesthesia


Additional Past Anesthesia/Blood Transfusion Reaction / Comment(s): DIFFICULTY 

WAKING UP.


Date of Last Stent Placement:: 2013


Past Psychological History: Anxiety, Depression, Panic Disorder


Additional Psychological History / Comment(s): SINCE 2015, GETS MILD DEPRESSION 

DURING WINTER MONTHS(SAD). Dementia


Smoking Status: Former smoker


Past Alcohol Use History: None Reported


Additional Past Alcohol Use History / Comment(s): SMOKED >40 YEARS, 3PPD, QUIT 

1989.


Past Drug Use History: None Reported





- Past Family History


  ** Mother


Family Medical History: Coronary Artery Disease (CAD)


Additional Family Medical History / Comment(s): QUAD BYPASS





  ** Brother(s)


Family Medical History: Coronary Artery Disease (CAD), Dementia





  ** Sister(s)


Family Medical History: No Reported History





  ** Daughter(s)


Family Medical History: No Reported History





  ** Son(s)


Family Medical History: No Reported History





General Exam


Limitations: altered mental status, physical limitation


General appearance: alert, in no apparent distress


Head exam: Present: atraumatic, normocephalic


Eye exam: Present: normal appearance


Respiratory exam: Present: normal lung sounds bilaterally.  Absent: chest wall 

tenderness


Cardiovascular Exam: Present: regular rate, normal rhythm


  ** Expanded


Peripheral pulses: 2+: Radial (R), Radial (L), Posterior Tibialis (R), Posterior

 Tibialis (L)


GI/Abdominal exam: Present: soft, tenderness (Mild diffuse tenderness.).  

Absent: distended


Extremities exam: Present: normal inspection


Neurological exam: Present: alert, altered, other (Limited exam.  Nonverbal.  No

 flaccid weakness.  Able to hold both arms off the bed.  Limited exam of legs.)


Psychiatric exam: Present: flat affect


Skin exam: Present: normal color





Course


                                   Vital Signs











  12/09/22





  11:47


 


Temperature 97.6 F


 


Pulse Rate 56 L


 


Respiratory 18





Rate 


 


Blood Pressure 142/69


 


O2 Sat by Pulse 98





Oximetry 














EKG Findings





- EKG Results:


EKG: interpreted by ISAIAS, sinus rhythm, normal axis, normal QRS, normal ST/T


EKG shows: bradycardia





Medical Decision Making





- Medical Decision Making





Patient reevaluated and resting comfortably in bed.  Spouse's now present.  She 

states she has noticed no signs of discomfort or distress either this morning or

 since she has been here.  She is updated regarding results.  She is made aware 

that heart attack and other problems have not been completely ruled out.  There 

was discussion regarding hospitalization and she does not feel this is necessary

 and would like to take him home.





- Lab Data


Result diagrams: 


                                 12/09/22 11:53





                                 12/09/22 11:53


                                   Lab Results











  12/09/22 12/09/22 12/09/22 Range/Units





  11:53 11:53 11:53 


 


WBC  6.8    (3.8-10.6)  k/uL


 


RBC  4.60    (4.30-5.90)  m/uL


 


Hgb  13.9    (13.0-17.5)  gm/dL


 


Hct  41.3    (39.0-53.0)  %


 


MCV  90.0    (80.0-100.0)  fL


 


MCH  30.2    (25.0-35.0)  pg


 


MCHC  33.6    (31.0-37.0)  g/dL


 


RDW  12.8    (11.5-15.5)  %


 


Plt Count  186    (150-450)  k/uL


 


MPV  7.3    


 


Neutrophils %  64    %


 


Lymphocytes %  25    %


 


Monocytes %  8    %


 


Eosinophils %  0    %


 


Basophils %  0    %


 


Neutrophils #  4.4    (1.3-7.7)  k/uL


 


Lymphocytes #  1.7    (1.0-4.8)  k/uL


 


Monocytes #  0.6    (0-1.0)  k/uL


 


Eosinophils #  0.0    (0-0.7)  k/uL


 


Basophils #  0.0    (0-0.2)  k/uL


 


PT   10.3   (9.0-12.0)  sec


 


INR   1.0   (<1.2)  


 


APTT   23.6   (22.0-30.0)  sec


 


Sodium     (137-145)  mmol/L


 


Potassium     (3.5-5.1)  mmol/L


 


Chloride     ()  mmol/L


 


Carbon Dioxide     (22-30)  mmol/L


 


Anion Gap     mmol/L


 


BUN     (9-20)  mg/dL


 


Creatinine     (0.66-1.25)  mg/dL


 


Est GFR (CKD-EPI)AfAm     (>60 ml/min/1.73 sqM)  


 


Est GFR (CKD-EPI)NonAf     (>60 ml/min/1.73 sqM)  


 


Glucose     (74-99)  mg/dL


 


Calcium     (8.4-10.2)  mg/dL


 


Total Bilirubin     (0.2-1.3)  mg/dL


 


AST     (17-59)  U/L


 


ALT     (4-49)  U/L


 


Alkaline Phosphatase     ()  U/L


 


Troponin I     (0.000-0.034)  ng/mL


 


Total Protein     (6.3-8.2)  g/dL


 


Albumin     (3.5-5.0)  g/dL


 


Amylase     ()  U/L


 


Lipase     ()  U/L


 


Urine Color    Yellow  


 


Urine Appearance    Clear  (Clear)  


 


Urine pH    5.5  (5.0-8.0)  


 


Ur Specific Gravity    1.018  (1.001-1.035)  


 


Urine Protein    Negative  (Negative)  


 


Urine Glucose (UA)    Negative  (Negative)  


 


Urine Ketones    Negative  (Negative)  


 


Urine Blood    Negative  (Negative)  


 


Urine Nitrite    Negative  (Negative)  


 


Urine Bilirubin    Negative  (Negative)  


 


Urine Urobilinogen    <2.0  (<2.0)  mg/dL


 


Ur Leukocyte Esterase    Negative  (Negative)  














  12/09/22 12/09/22 Range/Units





  11:53 11:53 


 


WBC    (3.8-10.6)  k/uL


 


RBC    (4.30-5.90)  m/uL


 


Hgb    (13.0-17.5)  gm/dL


 


Hct    (39.0-53.0)  %


 


MCV    (80.0-100.0)  fL


 


MCH    (25.0-35.0)  pg


 


MCHC    (31.0-37.0)  g/dL


 


RDW    (11.5-15.5)  %


 


Plt Count    (150-450)  k/uL


 


MPV    


 


Neutrophils %    %


 


Lymphocytes %    %


 


Monocytes %    %


 


Eosinophils %    %


 


Basophils %    %


 


Neutrophils #    (1.3-7.7)  k/uL


 


Lymphocytes #    (1.0-4.8)  k/uL


 


Monocytes #    (0-1.0)  k/uL


 


Eosinophils #    (0-0.7)  k/uL


 


Basophils #    (0-0.2)  k/uL


 


PT    (9.0-12.0)  sec


 


INR    (<1.2)  


 


APTT    (22.0-30.0)  sec


 


Sodium  140   (137-145)  mmol/L


 


Potassium  4.5   (3.5-5.1)  mmol/L


 


Chloride  107   ()  mmol/L


 


Carbon Dioxide  25   (22-30)  mmol/L


 


Anion Gap  8   mmol/L


 


BUN  20   (9-20)  mg/dL


 


Creatinine  0.78   (0.66-1.25)  mg/dL


 


Est GFR (CKD-EPI)AfAm  >90   (>60 ml/min/1.73 sqM)  


 


Est GFR (CKD-EPI)NonAf  85   (>60 ml/min/1.73 sqM)  


 


Glucose  87   (74-99)  mg/dL


 


Calcium  9.4   (8.4-10.2)  mg/dL


 


Total Bilirubin  0.5   (0.2-1.3)  mg/dL


 


AST  42   (17-59)  U/L


 


ALT  36   (4-49)  U/L


 


Alkaline Phosphatase  93   ()  U/L


 


Troponin I   <0.012  (0.000-0.034)  ng/mL


 


Total Protein  6.5   (6.3-8.2)  g/dL


 


Albumin  4.2   (3.5-5.0)  g/dL


 


Amylase  84   ()  U/L


 


Lipase  266   ()  U/L


 


Urine Color    


 


Urine Appearance    (Clear)  


 


Urine pH    (5.0-8.0)  


 


Ur Specific Gravity    (1.001-1.035)  


 


Urine Protein    (Negative)  


 


Urine Glucose (UA)    (Negative)  


 


Urine Ketones    (Negative)  


 


Urine Blood    (Negative)  


 


Urine Nitrite    (Negative)  


 


Urine Bilirubin    (Negative)  


 


Urine Urobilinogen    (<2.0)  mg/dL


 


Ur Leukocyte Esterase    (Negative)  














- Radiology Data


Radiology results: report reviewed (Computed tomography scan of the abdomen 

pelvis does not reveal acute process.)


Interpreted by me: 





Chest x-ray shows no acute process.





Disposition


Clinical Impression: 


 Abdominal discomfort





Disposition: HOME SELF-CARE


Condition: Stable


Instructions (If sedation given, give patient instructions):  Chest Pain (ED), 

Abdominal Pain (ED)


Additional Instructions: 


Please do follow-up with your primary care physician in the next day or 2 for 

recheck.  Return for pain, worsening symptoms or other concerns.


Is patient prescribed a controlled substance at d/c from ED?: No


Referrals: 


Elisa Cobos MD [Primary Care Provider] - 1-2 days


Time of Disposition: 14:33

## 2022-12-09 NOTE — CT
EXAMINATION TYPE: CT abdomen pelvis w con

CT DLP: 1579.9 mGycm, Automated exposure control for dose reduction was used.

 

DATE OF EXAM: 12/9/2022 1:26 PM

 

COMPARISON: CT abdomen pelvis most recent from 8/24/2022.

CLINICAL INDICATION:Male, 82 years old with history of abdominal pain; pain

 

TECHNIQUE:  Standard  CT of the abdomen and pelvis following the administration of 100 cc of Isovue 3
00 IV contrast material. Coronal and sagittal reformats were performed. 

 

FINDINGS: 

LOWER CHEST: Posterior dependent subsegmental atelectasis is noted. Severe coronary artery calcificat
ions.

 

ABDOMEN

LIVER: Unremarkable

GALLBLADDER AND BILE DUCTS: Unremarkable.

PANCREAS: Unremarkable.

SPLEEN: Unremarkable.

ADRENAL GLANDS: Unremarkable.

KIDNEYS AND URETERS: No evidence of hydronephrosis or renal calculus. The kidneys enhance symmetrical
ly without suspicious focal lesion. Mild bilateral cortical thinning.

 

PELVIS

BLADDER: No wall thickening or surrounding inflammatory changes. A portion of it extends into the rig
ht inguinal canal.

REPRODUCTIVE: Unremarkable.

 

ABDOMEN & PELVIS

STOMACH AND BOWEL: Stomach and duodenum are unremarkable . Colonic diverticulosis without evidence fo
r acute diverticulitis. The appendix is within normal limits. No evidence of bowel obstruction. 

PERITONEUM: No evidence of pneumoperitoneum or free fluid.

 

VASCULATURE: Severe atherosclerotic calcifications are present throughout the abdominal aorta and its
 branches. No evidence of aortic aneurysm. 

MUSCULOSKELETAL: No acute osseous abnormalities moderate multilevel degenerative disc disease. Mild a
trophy of the anterior right thigh muscles partially imaged.

LYMPH NODES: No gross evidence for lymphadenopathy.

SOFT TISSUE/ABDOMINAL WALL: Right inguinal hernia with portion of the bladder extending into it.

 

IMPRESSION:

1.  No acute abdominal/pelvic process.

2.  Colonic diverticulosis without evidence for acute diverticulitis.

## 2023-04-19 NOTE — ECHOF
Referral Reason:lv fxn, chest pain



MEASUREMENTS

--------

HEIGHT: 180.3 cm

WEIGHT: 93.4 kg

BP: 

RVIDd:   3.7 cm     (< 3.3)

IVSd:   1.2 cm     (0.6 - 1.1)

LVIDd:   4.2 cm     (3.9 - 5.3)

LVPWd:   1.1 cm     (0.6 - 1.1)

IVSs:   1.9 cm

LVIDs:   2.6 cm

LVPWs:   1.0 cm

Ao Diam:   3.1 cm     (2.0 - 3.7)

AV Cusp:   1.6 cm     (1.5 - 2.6)

LA Diam:   3.7 cm     (2.7 - 3.8)

MV EXCURSION:   20.130 mm     (> 18.000)

MV EF SLOPE:   74 mm/s     (70 - 150)

EPSS:   1.0 cm

MV E Lexx:   0.40 m/s

MV DecT:   258 ms

MV A Lexx:   0.59 m/s

MV E/A Ratio:   0.68 

RAP:   5.00 mmHg

RVSP:   17.01 mmHg







FINDINGS

--------

Sinus rhythm.

This was a technically adequate study.

The left ventricular size is normal.   There is borderline concentric left ventricular hypertrophy.  
 Overall left ventricular systolic function is normal with, an EF between 55 - 60 %.

The right ventricle is mildly enlarged.

The left atrial size is normal.

The right atrial size is normal.

Aortic valve is trileaflet and is mildly thickened.   Trace to mild aortic regurgitation.

The mitral valve leaflets are mildly thickened.   There is trace to mild mitral regurgitation.

The tricuspid valve appears structurally normal.   Trace tricuspid regurgitation present.   Right sumanth
tricular systolic pressure is normal at < 35 mmHg.

Trace/mild (physiologic)  pulmonic regurgitation.

The aortic root size is normal.

IVC Not well visulized.

There is no pericardial effusion.



CONCLUSIONS

--------

1. Sinus rhythm.

2. This was a technically adequate study.

3. The left ventricular size is normal.

4. There is borderline concentric left ventricular hypertrophy.

5. Overall left ventricular systolic function is normal with, an EF between 55 - 60 %.

6. The right ventricle is mildly enlarged.

7. The left atrial size is normal.

8. Aortic valve is trileaflet and is mildly thickened.

9. Trace to mild aortic regurgitation.

10. The mitral valve leaflets are mildly thickened.

11. There is trace to mild mitral regurgitation.

12. Trace tricuspid regurgitation present.

13. Right ventricular systolic pressure is normal at < 35 mmHg.

14. Trace/mild (physiologic)  pulmonic regurgitation.

15. IVC Not well visulized.

16. There is no pericardial effusion.





SONOGRAPHER: Patricia Figueroa RDCS No

## 2023-05-03 ENCOUNTER — HOSPITAL ENCOUNTER (EMERGENCY)
Dept: HOSPITAL 47 - EC | Age: 83
Discharge: HOME | End: 2023-05-03
Payer: COMMERCIAL

## 2023-05-03 VITALS — DIASTOLIC BLOOD PRESSURE: 68 MMHG | HEART RATE: 74 BPM | SYSTOLIC BLOOD PRESSURE: 116 MMHG | RESPIRATION RATE: 18 BRPM

## 2023-05-03 VITALS — TEMPERATURE: 98.5 F

## 2023-05-03 DIAGNOSIS — F32.A: ICD-10-CM

## 2023-05-03 DIAGNOSIS — N39.0: ICD-10-CM

## 2023-05-03 DIAGNOSIS — I10: ICD-10-CM

## 2023-05-03 DIAGNOSIS — F41.9: ICD-10-CM

## 2023-05-03 DIAGNOSIS — M19.90: ICD-10-CM

## 2023-05-03 DIAGNOSIS — Z79.82: ICD-10-CM

## 2023-05-03 DIAGNOSIS — Z79.899: ICD-10-CM

## 2023-05-03 DIAGNOSIS — Z20.822: ICD-10-CM

## 2023-05-03 DIAGNOSIS — E86.0: ICD-10-CM

## 2023-05-03 DIAGNOSIS — J44.9: ICD-10-CM

## 2023-05-03 DIAGNOSIS — E11.9: ICD-10-CM

## 2023-05-03 DIAGNOSIS — Z79.84: ICD-10-CM

## 2023-05-03 DIAGNOSIS — I25.2: ICD-10-CM

## 2023-05-03 DIAGNOSIS — E78.5: ICD-10-CM

## 2023-05-03 DIAGNOSIS — K21.9: ICD-10-CM

## 2023-05-03 DIAGNOSIS — Z87.891: ICD-10-CM

## 2023-05-03 DIAGNOSIS — R31.9: Primary | ICD-10-CM

## 2023-05-03 DIAGNOSIS — I25.10: ICD-10-CM

## 2023-05-03 LAB
ALBUMIN SERPL-MCNC: 4.3 G/DL (ref 3.5–5)
ALP SERPL-CCNC: 101 U/L (ref 38–126)
ALT SERPL-CCNC: 12 U/L (ref 4–49)
ANION GAP SERPL CALC-SCNC: 11 MMOL/L
APTT BLD: 23.6 SEC (ref 22–30)
AST SERPL-CCNC: 35 U/L (ref 17–59)
BASOPHILS # BLD AUTO: 0 K/UL (ref 0–0.2)
BASOPHILS NFR BLD AUTO: 0 %
BUN SERPL-SCNC: 21 MG/DL (ref 9–20)
CALCIUM SPEC-MCNC: 9.2 MG/DL (ref 8.4–10.2)
CHLORIDE SERPL-SCNC: 104 MMOL/L (ref 98–107)
CO2 SERPL-SCNC: 24 MMOL/L (ref 22–30)
EOSINOPHIL # BLD AUTO: 0 K/UL (ref 0–0.7)
EOSINOPHIL NFR BLD AUTO: 0 %
ERYTHROCYTE [DISTWIDTH] IN BLOOD BY AUTOMATED COUNT: 4.7 M/UL (ref 4.3–5.9)
ERYTHROCYTE [DISTWIDTH] IN BLOOD: 13.3 % (ref 11.5–15.5)
GLUCOSE BLD-MCNC: 93 MG/DL (ref 70–110)
GLUCOSE SERPL-MCNC: 76 MG/DL (ref 74–99)
HCT VFR BLD AUTO: 42.3 % (ref 39–53)
HGB BLD-MCNC: 13.8 GM/DL (ref 13–17.5)
INR PPP: 0.9 (ref ?–1.2)
LYMPHOCYTES # SPEC AUTO: 1.7 K/UL (ref 1–4.8)
LYMPHOCYTES NFR SPEC AUTO: 13 %
MCH RBC QN AUTO: 29.5 PG (ref 25–35)
MCHC RBC AUTO-ENTMCNC: 32.7 G/DL (ref 31–37)
MCV RBC AUTO: 90 FL (ref 80–100)
MONOCYTES # BLD AUTO: 1.2 K/UL (ref 0–1)
MONOCYTES NFR BLD AUTO: 9 %
NEUTROPHILS # BLD AUTO: 10.3 K/UL (ref 1.3–7.7)
NEUTROPHILS NFR BLD AUTO: 76 %
PH UR: 6 [PH] (ref 5–8)
PLATELET # BLD AUTO: 202 K/UL (ref 150–450)
POTASSIUM SERPL-SCNC: 4.5 MMOL/L (ref 3.5–5.1)
PROT SERPL-MCNC: 6.7 G/DL (ref 6.3–8.2)
PROT UR QL: (no result)
PT BLD: 10.1 SEC (ref 9–12)
RBC UR QL: >182 /HPF (ref 0–5)
SODIUM SERPL-SCNC: 139 MMOL/L (ref 137–145)
SP GR UR: 1.02 (ref 1–1.03)
UROBILINOGEN UR QL STRIP: <2 MG/DL (ref ?–2)
WBC # BLD AUTO: 13.5 K/UL (ref 3.8–10.6)
WBC # UR AUTO: >182 /HPF (ref 0–5)

## 2023-05-03 PROCEDURE — 82140 ASSAY OF AMMONIA: CPT

## 2023-05-03 PROCEDURE — 80306 DRUG TEST PRSMV INSTRMNT: CPT

## 2023-05-03 PROCEDURE — 76770 US EXAM ABDO BACK WALL COMP: CPT

## 2023-05-03 PROCEDURE — 93005 ELECTROCARDIOGRAM TRACING: CPT

## 2023-05-03 PROCEDURE — 87636 SARSCOV2 & INF A&B AMP PRB: CPT

## 2023-05-03 PROCEDURE — 85730 THROMBOPLASTIN TIME PARTIAL: CPT

## 2023-05-03 PROCEDURE — 87086 URINE CULTURE/COLONY COUNT: CPT

## 2023-05-03 PROCEDURE — 85610 PROTHROMBIN TIME: CPT

## 2023-05-03 PROCEDURE — 96374 THER/PROPH/DIAG INJ IV PUSH: CPT

## 2023-05-03 PROCEDURE — 36415 COLL VENOUS BLD VENIPUNCTURE: CPT

## 2023-05-03 PROCEDURE — 99284 EMERGENCY DEPT VISIT MOD MDM: CPT

## 2023-05-03 PROCEDURE — 96361 HYDRATE IV INFUSION ADD-ON: CPT

## 2023-05-03 PROCEDURE — 70450 CT HEAD/BRAIN W/O DYE: CPT

## 2023-05-03 PROCEDURE — 84484 ASSAY OF TROPONIN QUANT: CPT

## 2023-05-03 PROCEDURE — 80053 COMPREHEN METABOLIC PANEL: CPT

## 2023-05-03 PROCEDURE — 85025 COMPLETE CBC W/AUTO DIFF WBC: CPT

## 2023-05-03 PROCEDURE — 80320 DRUG SCREEN QUANTALCOHOLS: CPT

## 2023-05-03 PROCEDURE — 81001 URINALYSIS AUTO W/SCOPE: CPT

## 2023-05-03 PROCEDURE — 71046 X-RAY EXAM CHEST 2 VIEWS: CPT

## 2023-05-03 NOTE — US
EXAMINATION TYPE: US renals and bladder

 

DATE OF EXAM: 5/3/2023

 

COMPARISON: NONE

 

CLINICAL INDICATION: Male, 83 years old with history of hematuria; Gross hematuria 

 

EXAM MEASUREMENTS:

 

Right Kidney:  10.8 x 4.9 x 5.3 cm

Left Kidney: 12.4 x 5.9 x 5.5 cm

 

 

 

 

Right Kidney: No evidence of hydro  

Left Kidney: No evidence of hydro  

 

 

Bladder: Underdistention limited evaluation. The patient was catheterized before exam

**Bilateral Jets seen: No

 

 

 

IMPRESSION:

No hydronephrosis.

## 2023-05-03 NOTE — CT
EXAMINATION TYPE: CT brain wo con

 

DATE OF EXAM: 5/3/2023

 

COMPARISON: 11/13/2019

 

HISTORY: AMS. Hx dementia

 

CT DLP: 1172.4 mGycm

Automated exposure control for dose reduction was used.

 

FINDINGS: 

Moderate generalized degenerative change. No acute hemorrhage or mass effect. No midline shift. Low a
ttenuation in the white matter is nonspecific. Calvarium intact. Craniocervical junction maintained. 
Pineal gland calcification noted.

 

IMPRESSION: 

DEGENERATIVE AND REMOTE ISCHEMIC CHANGE WITH NO EVIDENCE OF ACUTE HEMORRHAGE OR MASS EFFECT.

## 2023-05-03 NOTE — ED
General Adult HPI





- General


Chief complaint: Abdominal Pain


Stated complaint: Blood in Urine- Suspects UTI


Time Seen by Provider: 05/03/23 12:12


Source: family, RN notes reviewed, old records reviewed


Mode of arrival: ambulatory


Limitations: no limitations





- History of Present Illness


Initial comments: 





Is an 83-year-old male with past medical history remarkable for dementia who is 

more or less mutual last year, CAD with multiple cardiac stents, COPD, diabetes,

hypertension who was brought in by family over concern for UTI.  Patient has had

hematuria.  Patient has no acute complaints.  He has been more tired lately per 

family.  States he really does not communicate well because of his dementia 

they've noticed that he seems more tired and does not because much of a fight 

when doing normal things like helping with his ADLs.  I then the behavior change

and hematuria, no other known acute complaints per family.  No fevers.  Patient 

is not complaining of pain.  Patient is eating normally.  No coughing.  No 

rhinorrhea.  No known sick contacts.  He presents for further evaluation this 

time.  Patient is unable to provide any history.Patient's brief was brought with

him and he does have what appears to be hematuria.





- Related Data


                                Home Medications











 Medication  Instructions  Recorded  Confirmed


 


Famotidine 40 mg PO BID 09/30/14 12/09/22


 


atenoloL [Tenormin] 25 mg PO DAILY 09/30/14 12/09/22


 


lisinopriL [Zestril] 2.5 mg PO HS 09/30/14 12/09/22


 


metFORMIN HCL [Glucophage] 500 mg PO HS 09/30/14 12/09/22


 


Aspirin 81 mg PO DAILY 03/25/16 12/09/22


 


Albuterol Inhaler [Ventolin Hfa 1 puff INHALATION RT-Q6H PRN 07/18/16 12/09/22





Inhaler]   


 


Cyanocobalamin [Vitamin B-12] 250 mcg PO DAILY 07/18/16 12/09/22


 


Lovastatin [Mevacor] 40 mg PO HS 09/19/16 12/09/22


 


Meloxicam 15 mg PO DAILY 12/14/17 12/09/22


 


Montelukast [Singulair] 10 mg PO HS 12/14/17 12/09/22


 


Multivitamins, Thera [Multivitamin 1 tab PO DAILY 12/14/17 12/09/22





(formulary)]   


 


Donepezil [Aricept] 10 mg PO HS 07/22/18 12/09/22


 


Memantine [Namenda] 10 mg PO BID 07/22/18 12/09/22


 


traZODone HCL 50 mg PO HS 06/08/19 12/09/22


 


Sertraline [Zoloft] 75 mg PO HS 01/22/20 12/09/22


 


Benralizumab [Fasenra] 30 mg SQ Q56D 03/28/20 12/09/22


 


Butalbital/Acetaminophen 50-325mg 1 - 2 tab PO Q4H PRN 08/24/22 12/09/22


 


Carbidopa-Levodopa  mg 1 tab PO TID 08/24/22 12/09/22





[Sinemet  mg]   


 


Ipratropium-Albuterol Nebulize 3 ml INHALATION RT-QID PRN 08/24/22 12/09/22





[Duoneb 0.5 mg-3 mg/3 ml Soln]   


 


Vit C/E/Zn/Coppr/Lutein/Zeaxan 1 cap PO BID 08/24/22 12/09/22





[Preservision Areds 2 Softgel]   


 


busPIRone HCl [Buspar] 5 mg PO BID 08/24/22 12/09/22


 


modafiniL [Provigil] 100 mg PO DAILY 08/24/22 12/09/22


 


Cholecalciferol [Vitamin D3 (25 25 mcg PO DAILY 12/09/22 12/09/22





Mcg = 1000 Iu)]   


 


traMADol HCL 25 mg PO HS 12/09/22 12/09/22








                                  Previous Rx's











 Medication  Instructions  Recorded


 


Isosorbide Mononitrate ER [Imdur] 30 mg PO DAILY 30 Days #30 tab 08/25/22


 


Nitroglycerin Sl Tabs [Nitrostat] 0.4 mg SUBLINGUAL Q5M PRN #30 tab 08/25/22


 


Sulfamethox-Tmp 800-160Mg [Bactrim 1 tab PO Q12HR 7 Days #14 tab 05/03/23





-160 mg]  











                                    Allergies











Allergy/AdvReac Type Severity Reaction Status Date / Time


 


No Known Allergies Allergy   Verified 05/03/23 11:44














Review of Systems


ROS Statement: 


Those systems with pertinent positive or pertinent negative responses have been 

documented in the HPI.





ROS Other: All systems not noted in ROS Statement are negative.





Past Medical History


Past Medical History: Asthma, Coronary Artery Disease (CAD), Chest Pain / 

Angina, COPD, Dementia, Diabetes Mellitus, Deep Vein Thrombosis (DVT), 

GERD/Reflux, Hyperlipidemia, Hypertension, Memory Impairment, Myocardial 

Infarction (MI), Osteoarthritis (OA), Pneumonia, Pulmonary Embolus (PE), Skin 

Disorder, Sleep Apnea/CPAP/BIPAP


Additional Past Medical History / Comment(s): Chronic Migraines.  Hx KATHY PE, LT 

DVT 4/2016.  Bruises easily.  Hx Kidney Stones. USES CPAP.  Sees Dr Patel, 

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


Last Myocardial Infarction Date:: 1999 AND 2000


History of Any Multi-Drug Resistant Organisms: None Reported


Past Surgical History: Heart Catheterization With Stent, Joint Replacement


Additional Past Surgical History / Comment(s): 7 STENTS.  LT KNEE REPLACEMENT.  

LT ACHILLES TENDON REPAIR.   bilat [facility] 7/19/16.  Pain proc


Past Anesthesia/Blood Transfusion Reactions: Previous Problems w/ Anesthesia


Additional Past Anesthesia/Blood Transfusion Reaction / Comment(s): DIFFICULTY 

WAKING UP.


Date of Last Stent Placement:: 2013


Past Psychological History: Anxiety, Depression, Panic Disorder


Smoking Status: Former smoker


Past Alcohol Use History: None Reported


Past Drug Use History: None Reported





- Past Family History


  ** Mother


Family Medical History: Coronary Artery Disease (CAD)


Additional Family Medical History / Comment(s): QUAD BYPASS





  ** Brother(s)


Family Medical History: Coronary Artery Disease (CAD), Dementia





  ** Sister(s)


Family Medical History: No Reported History





  ** Daughter(s)


Family Medical History: No Reported History





  ** Son(s)


Family Medical History: No Reported History





General Exam





- General Exam Comments


Initial Comments: 





General: Appears in no acute distress.


HEAD:  Normal with no signs of head trauma.


EYES:  PERRLA, EOMI, conjunctiva normal, no discharge.  Pupils are 3 mm and 

equal bilaterally.


ENT:  Hearing grossly intact, normal oropharynx.  Dry mucous membranes.


RESPIRATORY:  Clear breath sounds bilaterally.  No wheezes, rales, or rhonchi.  

No hypoxia.  No respiratory distress.  


C/V:  Regular rate and rhythm. S1 and S2 auscultated, no edema, peripheral 

pulses 2+ and intact throughout


ABD:  Abd is soft, nontender, nondistended


EXT: Normal range of motion, no obvious deformity


SKIN:  No rashes or lesions observed on exposed skin.


NEURO: Alert but not oriented.  This appears to be his baseline.  Patient is 

mute and does not answer questions.  He is able to move all 4 extremities.


Limitations: no limitations





Course


                                   Vital Signs











  05/03/23 05/03/23 05/03/23





  11:42 12:03 13:52


 


Temperature 98.5 F  


 


Pulse Rate 70 68 70


 


Respiratory 22 16 20





Rate   


 


Blood Pressure 148/75 106/56 106/75


 


O2 Sat by Pulse 99 96 98





Oximetry   














  05/03/23





  15:30


 


Temperature 


 


Pulse Rate 74


 


Respiratory 18





Rate 


 


Blood Pressure 116/68


 


O2 Sat by Pulse 99





Oximetry 














Medical Decision Making





- Medical Decision Making





Was pt. sent in by a medical professional or institution (, PA, NP, urgent 

care, hospital, or nursing home...) When possible be specific


@  -No


Did you speak to anyone other than the patient for history (EMS, parent, family,

police, friend...)? What history was obtained from this source 


@  -Spoke with patient's wife Kit who is the primary historian.  He provides all

the history for the patient is patient is mute.


Did you review nursing and triage notes (agree or disagree)?  Why? 


@  -I reviewed and agree with nursing and triage notes


Were old charts reviewed (outside hosp., previous admission, EMS record, old 

EKG, old radiological studies, urgent care reports/EKG's, nursing home records)?

Report findings 


@  -No old charts were reviewed


Differential Diagnosis (chest pain, altered mental status, abdominal pain women,

abdominal pain men, vaginal bleeding, weakness, fever, dyspnea, syncope, 

headache, dizziness, GI bleed, back pain, seizure, CVA, palpatations, mental 

health, musculoskeletal)? 


@  -Differential Altered Mental Status:


Hypoglycemia, DKA, hypercapnia, ETOH, overdose, CO poisoning, trauma, myxedema 

coma, HTN encephalopathy, infection, encephalitis, psychosis, intercranial 

hemorrhage, hepatic encephalopathy, meningitis, CVA, this is not meant to be an 

all-inclusive list





EKG interpreted by me (3pts min.).


@  -As above


X-rays interpreted by me (1pt min.).


@  -Chest x-ray shows no obvious acute cardio pulmonary process.


CT interpreted by me (1pt min.).


@  -CT brain shows chronic degenerative changes in no acute process.


U/S interpreted by me (1pt. min.).


@  -Renal ultrasound is interpreted by radiology shows no obvious process, no 

hydronephrosis.


What testing was considered but not performed or refused? (CT, X-rays, U/S, 

labs)? Why?


@  -None


What meds were considered but not given or refused? Why?


@  -None


Did you discuss the management of the patient with other professionals 

(professionals i.e. DrBoob, PA, NP, lab, RT, psych nurse, , , 

teacher, , )? Give summary


@  -No


Was smoking cessation discussed for >3mins.?


@  -No


Was critical care preformed (if so, how long)?


@  -No


Were there social determinants of health that impacted care today? How? 

(Homelessness, low income, unemployed, alcoholism, drug addiction, putnam

sportation, low edu. Level, literacy, decrease access to med. care, FDC, 

rehab)?


@  -No


Was there de-escalation of care discussed even if they declined (Discuss DNR or 

withdrawal of care, Hospice)? DNR status


@  -No


What co-morbidities impacted this encounter? (DM, HTN, Smoking, COPD, CAD, 

Cancer, CVA, ARF, Chemo, Hep., AIDS, mental health diagnosis, sleep apnea, 

morbid obesity)?


@  -None


Was patient admitted / discharged? Hospital course, mention meds given and 

route, prescriptions, significant lab abnormalities, going to OR and other 

pertinent info.


@  -Based on the patient's presentation and physical exam, I'm concerned for an 

acute infectious process for the patient.  Had hematuria and dry earlier today. 

This seems more tired lately as well.  We will obtain brought altered mental 

status workup as the patient does have a history of dementia and is mute and 

cannot provide any history.  Patient's wife was in agreement this plan was at 

bedside.  Vital signs are within acceptable limits.  Patient will be 

administered 1 L fluid bolus.





EKG shows no obvious acute ischemia.  Labs are remarkable for slight 

leukocytosis of 13.5.  Normal renal function.  Urinalysis is positive for UTI.  

Remainder the labs are within except for limits.  Troponin is undetectable.  

Imaging is unremarkable.





At this time I discussed results of patient's wife as well as the patient.  He 

is more energetic at this time back to his baseline per wife.  We'll treat him 

with a dose of IV Rocephin and start him on Bactrim for his UTI.  She was in 

agreement with this plan.  Strict return precautions discussed.  Did offer 

admission if she felt that the patient was too much to handle at home and she is

the primary caregiver but she states she is fine with him coming home.  Will 

return if any issues.





I will provide the patient with a prescription for Bactrim. I instructed the 

patient to follow up with their PCP in the next 1-3 days.  I explained that the 

patient should return to the emergency department if they experience any 

worsening symptoms. Strict return precautions were discussed with the patient. 

The patient expressed understanding of these instructions. I answered all 

questions that the patient had. The patient was discharged home in good 

condition with their prescriptions and follow up information.





Undiagnosed new problem with uncertain prognosis?


@  -No


Drug Therapy requiring intensive monitoring for toxicity (Heparin, Nitro, 

Insulin, Cardizem)?


@  -No


Were any procedures done?


@  -No





Diagnosis/symptom?


@  -UTI, mild dehydration


Acute, or Chronic, or Acute on Chronic?


@  -Acute


Uncomplicated (without systemic symptoms) or Complicated (systemic symptoms)?


@  -uncomplicated


Side effects of treatment?


@  -none


Exacerbation, Progression, or Severe Exacerbation]


@  -no


Poses a threat to life or bodily function?


@  -Yes, if untreated can result in worsening infection.





- Lab Data


Result diagrams: 


                                 05/03/23 12:44





                                 05/03/23 12:44


                                   Lab Results











  05/03/23 05/03/23 05/03/23 Range/Units





  12:37 12:44 12:44 


 


WBC   13.5 H   (3.8-10.6)  k/uL


 


RBC   4.70   (4.30-5.90)  m/uL


 


Hgb   13.8   (13.0-17.5)  gm/dL


 


Hct   42.3   (39.0-53.0)  %


 


MCV   90.0   (80.0-100.0)  fL


 


MCH   29.5   (25.0-35.0)  pg


 


MCHC   32.7   (31.0-37.0)  g/dL


 


RDW   13.3   (11.5-15.5)  %


 


Plt Count   202   (150-450)  k/uL


 


MPV   7.7   


 


Neutrophils %   76   %


 


Lymphocytes %   13   %


 


Monocytes %   9   %


 


Eosinophils %   0   %


 


Basophils %   0   %


 


Neutrophils #   10.3 H   (1.3-7.7)  k/uL


 


Lymphocytes #   1.7   (1.0-4.8)  k/uL


 


Monocytes #   1.2 H   (0-1.0)  k/uL


 


Eosinophils #   0.0   (0-0.7)  k/uL


 


Basophils #   0.0   (0-0.2)  k/uL


 


PT    10.1  (9.0-12.0)  sec


 


INR    0.9  (<1.2)  


 


APTT    23.6  (22.0-30.0)  sec


 


Sodium     (137-145)  mmol/L


 


Potassium     (3.5-5.1)  mmol/L


 


Chloride     ()  mmol/L


 


Carbon Dioxide     (22-30)  mmol/L


 


Anion Gap     mmol/L


 


BUN     (9-20)  mg/dL


 


Creatinine     (0.66-1.25)  mg/dL


 


Est GFR (CKD-EPI)AfAm     (>60 ml/min/1.73 sqM)  


 


Est GFR (CKD-EPI)NonAf     (>60 ml/min/1.73 sqM)  


 


Glucose     (74-99)  mg/dL


 


POC Glucose (mg/dL)  93    ()  mg/dL


 


POC Glu Operater ID  Townsend, Concepcion    


 


Calcium     (8.4-10.2)  mg/dL


 


Total Bilirubin     (0.2-1.3)  mg/dL


 


AST     (17-59)  U/L


 


ALT     (4-49)  U/L


 


Alkaline Phosphatase     ()  U/L


 


Ammonia     (<30)  umol/L


 


Troponin I     (0.000-0.034)  ng/mL


 


Total Protein     (6.3-8.2)  g/dL


 


Albumin     (3.5-5.0)  g/dL


 


Urine Color     


 


Urine Appearance     (Clear)  


 


Urine pH     (5.0-8.0)  


 


Ur Specific Gravity     (1.001-1.035)  


 


Urine Protein     (Negative)  


 


Urine Glucose (UA)     (Negative)  


 


Urine Ketones     (Negative)  


 


Urine Blood     (Negative)  


 


Urine Nitrite     (Negative)  


 


Urine Bilirubin     (Negative)  


 


Urine Urobilinogen     (<2.0)  mg/dL


 


Ur Leukocyte Esterase     (Negative)  


 


Urine RBC     (0-5)  /hpf


 


Urine WBC     (0-5)  /hpf


 


Urine WBC Clumps     (None)  /hpf


 


Urine Bacteria     (None)  /hpf


 


Urine Mucus     (None)  /hpf


 


Urine Opiates Screen     (NotDetected)  


 


Ur Oxycodone Screen     (NotDetected)  


 


Urine Methadone Screen     (NotDetected)  


 


Ur Propoxyphene Screen     (NotDetected)  


 


Ur Barbiturates Screen     (NotDetected)  


 


U Tricyclic Antidepress     (NotDetected)  


 


Ur Phencyclidine Scrn     (NotDetected)  


 


Ur Amphetamines Screen     (NotDetected)  


 


U Methamphetamines Scrn     (NotDetected)  


 


U Benzodiazepines Scrn     (NotDetected)  


 


Urine Cocaine Screen     (NotDetected)  


 


U Marijuana (THC) Screen     (NotDetected)  


 


Serum Alcohol     mg/dL


 


Influenza Type A (PCR)     (Not Detectd)  


 


Influenza Type B (PCR)     (Not Detectd)  


 


RSV (PCR)     (Not Detectd)  


 


SARS-CoV-2 (PCR)     (Not Detectd)  














  05/03/23 05/03/23 05/03/23 Range/Units





  12:44 12:44 12:44 


 


WBC     (3.8-10.6)  k/uL


 


RBC     (4.30-5.90)  m/uL


 


Hgb     (13.0-17.5)  gm/dL


 


Hct     (39.0-53.0)  %


 


MCV     (80.0-100.0)  fL


 


MCH     (25.0-35.0)  pg


 


MCHC     (31.0-37.0)  g/dL


 


RDW     (11.5-15.5)  %


 


Plt Count     (150-450)  k/uL


 


MPV     


 


Neutrophils %     %


 


Lymphocytes %     %


 


Monocytes %     %


 


Eosinophils %     %


 


Basophils %     %


 


Neutrophils #     (1.3-7.7)  k/uL


 


Lymphocytes #     (1.0-4.8)  k/uL


 


Monocytes #     (0-1.0)  k/uL


 


Eosinophils #     (0-0.7)  k/uL


 


Basophils #     (0-0.2)  k/uL


 


PT     (9.0-12.0)  sec


 


INR     (<1.2)  


 


APTT     (22.0-30.0)  sec


 


Sodium   139   (137-145)  mmol/L


 


Potassium   4.5   (3.5-5.1)  mmol/L


 


Chloride   104   ()  mmol/L


 


Carbon Dioxide   24   (22-30)  mmol/L


 


Anion Gap   11   mmol/L


 


BUN   21 H   (9-20)  mg/dL


 


Creatinine   0.75   (0.66-1.25)  mg/dL


 


Est GFR (CKD-EPI)AfAm   >90   (>60 ml/min/1.73 sqM)  


 


Est GFR (CKD-EPI)NonAf   86   (>60 ml/min/1.73 sqM)  


 


Glucose   76   (74-99)  mg/dL


 


POC Glucose (mg/dL)     ()  mg/dL


 


POC Glu Operater ID     


 


Calcium   9.2   (8.4-10.2)  mg/dL


 


Total Bilirubin   0.8   (0.2-1.3)  mg/dL


 


AST   35   (17-59)  U/L


 


ALT   12   (4-49)  U/L


 


Alkaline Phosphatase   101   ()  U/L


 


Ammonia    <9  (<30)  umol/L


 


Troponin I     (0.000-0.034)  ng/mL


 


Total Protein   6.7   (6.3-8.2)  g/dL


 


Albumin   4.3   (3.5-5.0)  g/dL


 


Urine Color  Red    


 


Urine Appearance  Turbid    (Clear)  


 


Urine pH  6.0    (5.0-8.0)  


 


Ur Specific Gravity  1.023    (1.001-1.035)  


 


Urine Protein  3+ H    (Negative)  


 


Urine Glucose (UA)  Negative    (Negative)  


 


Urine Ketones  Trace H    (Negative)  


 


Urine Blood  Large H    (Negative)  


 


Urine Nitrite  Positive    (Negative)  


 


Urine Bilirubin  Negative    (Negative)  


 


Urine Urobilinogen  <2.0    (<2.0)  mg/dL


 


Ur Leukocyte Esterase  Large H    (Negative)  


 


Urine RBC  >182 H    (0-5)  /hpf


 


Urine WBC  >182 H    (0-5)  /hpf


 


Urine WBC Clumps  Moderate H    (None)  /hpf


 


Urine Bacteria  Many H    (None)  /hpf


 


Urine Mucus  Rare H    (None)  /hpf


 


Urine Opiates Screen  Not Detected    (NotDetected)  


 


Ur Oxycodone Screen  Not Detected    (NotDetected)  


 


Urine Methadone Screen  Not Detected    (NotDetected)  


 


Ur Propoxyphene Screen  Not Detected    (NotDetected)  


 


Ur Barbiturates Screen  Not Detected    (NotDetected)  


 


U Tricyclic Antidepress  Not Detected    (NotDetected)  


 


Ur Phencyclidine Scrn  Not Detected    (NotDetected)  


 


Ur Amphetamines Screen  Not Detected    (NotDetected)  


 


U Methamphetamines Scrn  Not Detected    (NotDetected)  


 


U Benzodiazepines Scrn  Not Detected    (NotDetected)  


 


Urine Cocaine Screen  Not Detected    (NotDetected)  


 


U Marijuana (THC) Screen  Not Detected    (NotDetected)  


 


Serum Alcohol   <10   mg/dL


 


Influenza Type A (PCR)     (Not Detectd)  


 


Influenza Type B (PCR)     (Not Detectd)  


 


RSV (PCR)     (Not Detectd)  


 


SARS-CoV-2 (PCR)     (Not Detectd)  














  05/03/23 05/03/23 Range/Units





  12:44 12:44 


 


WBC    (3.8-10.6)  k/uL


 


RBC    (4.30-5.90)  m/uL


 


Hgb    (13.0-17.5)  gm/dL


 


Hct    (39.0-53.0)  %


 


MCV    (80.0-100.0)  fL


 


MCH    (25.0-35.0)  pg


 


MCHC    (31.0-37.0)  g/dL


 


RDW    (11.5-15.5)  %


 


Plt Count    (150-450)  k/uL


 


MPV    


 


Neutrophils %    %


 


Lymphocytes %    %


 


Monocytes %    %


 


Eosinophils %    %


 


Basophils %    %


 


Neutrophils #    (1.3-7.7)  k/uL


 


Lymphocytes #    (1.0-4.8)  k/uL


 


Monocytes #    (0-1.0)  k/uL


 


Eosinophils #    (0-0.7)  k/uL


 


Basophils #    (0-0.2)  k/uL


 


PT    (9.0-12.0)  sec


 


INR    (<1.2)  


 


APTT    (22.0-30.0)  sec


 


Sodium    (137-145)  mmol/L


 


Potassium    (3.5-5.1)  mmol/L


 


Chloride    ()  mmol/L


 


Carbon Dioxide    (22-30)  mmol/L


 


Anion Gap    mmol/L


 


BUN    (9-20)  mg/dL


 


Creatinine    (0.66-1.25)  mg/dL


 


Est GFR (CKD-EPI)AfAm    (>60 ml/min/1.73 sqM)  


 


Est GFR (CKD-EPI)NonAf    (>60 ml/min/1.73 sqM)  


 


Glucose    (74-99)  mg/dL


 


POC Glucose (mg/dL)    ()  mg/dL


 


POC Glu Operater ID    


 


Calcium    (8.4-10.2)  mg/dL


 


Total Bilirubin    (0.2-1.3)  mg/dL


 


AST    (17-59)  U/L


 


ALT    (4-49)  U/L


 


Alkaline Phosphatase    ()  U/L


 


Ammonia    (<30)  umol/L


 


Troponin I  <0.012   (0.000-0.034)  ng/mL


 


Total Protein    (6.3-8.2)  g/dL


 


Albumin    (3.5-5.0)  g/dL


 


Urine Color    


 


Urine Appearance    (Clear)  


 


Urine pH    (5.0-8.0)  


 


Ur Specific Gravity    (1.001-1.035)  


 


Urine Protein    (Negative)  


 


Urine Glucose (UA)    (Negative)  


 


Urine Ketones    (Negative)  


 


Urine Blood    (Negative)  


 


Urine Nitrite    (Negative)  


 


Urine Bilirubin    (Negative)  


 


Urine Urobilinogen    (<2.0)  mg/dL


 


Ur Leukocyte Esterase    (Negative)  


 


Urine RBC    (0-5)  /hpf


 


Urine WBC    (0-5)  /hpf


 


Urine WBC Clumps    (None)  /hpf


 


Urine Bacteria    (None)  /hpf


 


Urine Mucus    (None)  /hpf


 


Urine Opiates Screen    (NotDetected)  


 


Ur Oxycodone Screen    (NotDetected)  


 


Urine Methadone Screen    (NotDetected)  


 


Ur Propoxyphene Screen    (NotDetected)  


 


Ur Barbiturates Screen    (NotDetected)  


 


U Tricyclic Antidepress    (NotDetected)  


 


Ur Phencyclidine Scrn    (NotDetected)  


 


Ur Amphetamines Screen    (NotDetected)  


 


U Methamphetamines Scrn    (NotDetected)  


 


U Benzodiazepines Scrn    (NotDetected)  


 


Urine Cocaine Screen    (NotDetected)  


 


U Marijuana (THC) Screen    (NotDetected)  


 


Serum Alcohol    mg/dL


 


Influenza Type A (PCR)   Not Detected  (Not Detectd)  


 


Influenza Type B (PCR)   Not Detected  (Not Detectd)  


 


RSV (PCR)   Not Detected  (Not Detectd)  


 


SARS-CoV-2 (PCR)   Not Detected  (Not Detectd)  














- EKG Data


-: EKG Interpreted by Me


EKG Comments: 





12-lead Electrocardiogram Interpretation Note





EKG was reviewed and interpreted by myself. 12-lead ECG performed at 1254 is 

interpreted by me as revealing normal sinus rhythm at a rate of 64 beats per 

minute.  Axis is normal.  KY interval is 187 ms, QRS duration is 102 ms, QTc is 

408 ms..  There were no ST or T wave abnormalities to suggest myocardial 

ischemia or injury. R wave progression across the precordium was satisfactory. 

By my interpretation this EKG is non-diagnostic for acute ischemia.  When 

compared with EKG from 12/09/2022, no significant change.





Disposition


Clinical Impression: 


 Hematuria, UTI (urinary tract infection), Dehydration





Disposition: HOME SELF-CARE


Condition: Good


Instructions (If sedation given, give patient instructions):  Urinary Tract 

Infection in Men (ED)


Prescriptions: 


Sulfamethox-Tmp 800-160Mg [Bactrim -160 mg] 1 tab PO Q12HR 7 Days #14 tab


Is patient prescribed a controlled substance at d/c from ED?: No


Referrals: 


Elisa Cobos MD [Primary Care Provider] - 1-2 days


Time of Disposition: 14:51

## 2023-07-31 ENCOUNTER — HOSPITAL ENCOUNTER (INPATIENT)
Dept: HOSPITAL 47 - EC | Age: 83
LOS: 7 days | Discharge: NURSING, REHAB OR LONG-TERM CARE | DRG: 92 | End: 2023-08-07
Attending: HOSPITALIST | Admitting: HOSPITALIST
Payer: COMMERCIAL

## 2023-07-31 DIAGNOSIS — Z87.891: ICD-10-CM

## 2023-07-31 DIAGNOSIS — E78.5: ICD-10-CM

## 2023-07-31 DIAGNOSIS — J45.909: ICD-10-CM

## 2023-07-31 DIAGNOSIS — Z95.5: ICD-10-CM

## 2023-07-31 DIAGNOSIS — G92.8: Primary | ICD-10-CM

## 2023-07-31 DIAGNOSIS — Z20.822: ICD-10-CM

## 2023-07-31 DIAGNOSIS — Z79.84: ICD-10-CM
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
## 2023-07-31 DIAGNOSIS — Z87.01: ICD-10-CM

## 2023-07-31 DIAGNOSIS — I10: ICD-10-CM

## 2023-07-31 DIAGNOSIS — G31.89: ICD-10-CM

## 2023-07-31 DIAGNOSIS — I25.2: ICD-10-CM

## 2023-07-31 DIAGNOSIS — F03.918: ICD-10-CM

## 2023-07-31 DIAGNOSIS — R53.1: ICD-10-CM

## 2023-07-31 DIAGNOSIS — Z79.82: ICD-10-CM

## 2023-07-31 DIAGNOSIS — Z86.711: ICD-10-CM

## 2023-07-31 DIAGNOSIS — T43.595A: ICD-10-CM

## 2023-07-31 DIAGNOSIS — Z98.41: ICD-10-CM

## 2023-07-31 DIAGNOSIS — Z98.42: ICD-10-CM

## 2023-07-31 DIAGNOSIS — Z82.49: ICD-10-CM

## 2023-07-31 DIAGNOSIS — K21.9: ICD-10-CM

## 2023-07-31 DIAGNOSIS — Z95.1: ICD-10-CM

## 2023-07-31 DIAGNOSIS — Z79.899: ICD-10-CM

## 2023-07-31 DIAGNOSIS — Z86.718: ICD-10-CM

## 2023-07-31 DIAGNOSIS — Z96.652: ICD-10-CM

## 2023-07-31 DIAGNOSIS — Z79.01: ICD-10-CM

## 2023-07-31 DIAGNOSIS — Z87.442: ICD-10-CM

## 2023-07-31 DIAGNOSIS — Z79.1: ICD-10-CM

## 2023-07-31 DIAGNOSIS — J44.9: ICD-10-CM

## 2023-07-31 LAB
ALBUMIN SERPL-MCNC: 4.4 G/DL (ref 3.5–5)
ALP SERPL-CCNC: 96 U/L (ref 38–126)
ALT SERPL-CCNC: 27 U/L (ref 4–49)
ANION GAP SERPL CALC-SCNC: 10 MMOL/L
AST SERPL-CCNC: 35 U/L (ref 17–59)
BASOPHILS # BLD AUTO: 0 K/UL (ref 0–0.2)
BASOPHILS NFR BLD AUTO: 0 %
BUN SERPL-SCNC: 24 MG/DL (ref 9–20)
CALCIUM SPEC-MCNC: 9.6 MG/DL (ref 8.4–10.2)
CHLORIDE SERPL-SCNC: 108 MMOL/L (ref 98–107)
CO2 SERPL-SCNC: 25 MMOL/L (ref 22–30)
EOSINOPHIL # BLD AUTO: 0 K/UL (ref 0–0.7)
EOSINOPHIL NFR BLD AUTO: 0 %
ERYTHROCYTE [DISTWIDTH] IN BLOOD BY AUTOMATED COUNT: 4.72 M/UL (ref 4.3–5.9)
ERYTHROCYTE [DISTWIDTH] IN BLOOD: 13.2 % (ref 11.5–15.5)
GLUCOSE SERPL-MCNC: 136 MG/DL (ref 74–99)
HCT VFR BLD AUTO: 43.3 % (ref 39–53)
HGB BLD-MCNC: 14.2 GM/DL (ref 13–17.5)
LIPASE SERPL-CCNC: 280 U/L (ref 23–300)
LYMPHOCYTES # SPEC AUTO: 1.8 K/UL (ref 1–4.8)
LYMPHOCYTES NFR SPEC AUTO: 25 %
MAGNESIUM SPEC-SCNC: 2.2 MG/DL (ref 1.6–2.3)
MCH RBC QN AUTO: 30.1 PG (ref 25–35)
MCHC RBC AUTO-ENTMCNC: 32.8 G/DL (ref 31–37)
MCV RBC AUTO: 91.8 FL (ref 80–100)
MONOCYTES # BLD AUTO: 0.6 K/UL (ref 0–1)
MONOCYTES NFR BLD AUTO: 8 %
NEUTROPHILS # BLD AUTO: 4.7 K/UL (ref 1.3–7.7)
NEUTROPHILS NFR BLD AUTO: 65 %
PH UR: 5.5 [PH] (ref 5–8)
PLATELET # BLD AUTO: 166 K/UL (ref 150–450)
POTASSIUM SERPL-SCNC: 3.8 MMOL/L (ref 3.5–5.1)
PROT SERPL-MCNC: 7.1 G/DL (ref 6.3–8.2)
SODIUM SERPL-SCNC: 143 MMOL/L (ref 137–145)
SP GR UR: 1.02 (ref 1–1.03)
UROBILINOGEN UR QL STRIP: <2 MG/DL (ref ?–2)
WBC # BLD AUTO: 7.2 K/UL (ref 3.8–10.6)

## 2023-07-31 PROCEDURE — 95816 EEG AWAKE AND DROWSY: CPT

## 2023-07-31 PROCEDURE — 82607 VITAMIN B-12: CPT

## 2023-07-31 PROCEDURE — 83690 ASSAY OF LIPASE: CPT

## 2023-07-31 PROCEDURE — 85025 COMPLETE CBC W/AUTO DIFF WBC: CPT

## 2023-07-31 PROCEDURE — 93005 ELECTROCARDIOGRAM TRACING: CPT

## 2023-07-31 PROCEDURE — 71046 X-RAY EXAM CHEST 2 VIEWS: CPT

## 2023-07-31 PROCEDURE — 80048 BASIC METABOLIC PNL TOTAL CA: CPT

## 2023-07-31 PROCEDURE — 82746 ASSAY OF FOLIC ACID SERUM: CPT

## 2023-07-31 PROCEDURE — 84443 ASSAY THYROID STIM HORMONE: CPT

## 2023-07-31 PROCEDURE — 82140 ASSAY OF AMMONIA: CPT

## 2023-07-31 PROCEDURE — 81003 URINALYSIS AUTO W/O SCOPE: CPT

## 2023-07-31 PROCEDURE — 83735 ASSAY OF MAGNESIUM: CPT

## 2023-07-31 PROCEDURE — 36415 COLL VENOUS BLD VENIPUNCTURE: CPT

## 2023-07-31 PROCEDURE — 99285 EMERGENCY DEPT VISIT HI MDM: CPT

## 2023-07-31 PROCEDURE — 70450 CT HEAD/BRAIN W/O DYE: CPT

## 2023-07-31 PROCEDURE — 87635 SARS-COV-2 COVID-19 AMP PRB: CPT

## 2023-07-31 PROCEDURE — 80053 COMPREHEN METABOLIC PANEL: CPT

## 2023-07-31 PROCEDURE — 83605 ASSAY OF LACTIC ACID: CPT

## 2023-07-31 RX ADMIN — DONEPEZIL HYDROCHLORIDE SCH: 10 TABLET ORAL at 23:53

## 2023-07-31 RX ADMIN — FAMOTIDINE SCH: 20 TABLET, FILM COATED ORAL at 23:53

## 2023-07-31 RX ADMIN — MONTELUKAST SODIUM SCH: 10 TABLET, FILM COATED ORAL at 23:57

## 2023-07-31 RX ADMIN — I-VITE, TAB 1000-60-2MG (60/BT) SCH: TAB at 23:57

## 2023-07-31 RX ADMIN — MEMANTINE HYDROCHLORIDE SCH: 10 TABLET ORAL at 23:57

## 2023-07-31 RX ADMIN — SERTRALINE HYDROCHLORIDE SCH: 25 TABLET ORAL at 23:57

## 2023-07-31 RX ADMIN — CARBIDOPA AND LEVODOPA SCH: 25; 100 TABLET ORAL at 23:53

## 2023-07-31 RX ADMIN — ATORVASTATIN CALCIUM SCH: 10 TABLET, FILM COATED ORAL at 23:53

## 2023-07-31 NOTE — ED
Weakness HPI





- General


Source: RN notes reviewed





<Lopez Mariena - Last Filed: 07/31/23 16:56>





<Elana Faust - Last Filed: 08/01/23 03:05>





- General


Stated complaint: Fatigue, Weakness


Time Seen by Provider: 07/31/23 16:56





- History of Present Illness


Initial comments: 


Patient is an 83-year-old male who presents to the emergency department for 

weakness. (CynthiaNilam)


83-year-old male with past medical history of dementia, nonverbal who presents 

to the emergency department with alteration in mental status.  Wife is at 

bedside and provides history.  States that she is his caregiver.  He has become 

aggressive in the past few months because of his dementia.  He was seen by his 

primary care physician one week ago and placed on Rexulti.  Wife states that sin

ce he has been on this medication he has become increasingly sedated.  She had 

difficulty awakening him this morning.  He has not ate or drank much today.  She

did take him to his adult  and he was unable to participate in any 

activities.  Wife states that he has been unable to ambulate on his own and 

sustained a fall today due to his sleepiness.  Due to her inability to care for 

the patient, she brought the patient into the emergency department.  HPI is 

limited due to the patient's nonverbal status (Elana Faust)





- Related Data


                                Home Medications











 Medication  Instructions  Recorded  Confirmed


 


Famotidine 40 mg PO BID 09/30/14 07/31/23


 


atenoloL [Tenormin] 25 mg PO DAILY 09/30/14 07/31/23


 


lisinopriL [Zestril] 2.5 mg PO HS 09/30/14 07/31/23


 


metFORMIN HCL [Glucophage] 500 mg PO HS 09/30/14 07/31/23


 


Aspirin 81 mg PO DAILY 03/25/16 07/31/23


 


Albuterol Inhaler [Ventolin Hfa 1 puff INHALATION RT-Q6H PRN 07/18/16 07/31/23





Inhaler]   


 


Cyanocobalamin [Vitamin B-12] 250 mcg PO DAILY 07/18/16 07/31/23


 


Lovastatin [Mevacor] 40 mg PO HS 09/19/16 07/31/23


 


Meloxicam 15 mg PO DAILY 12/14/17 07/31/23


 


Montelukast [Singulair] 10 mg PO HS 12/14/17 07/31/23


 


Multivitamins, Thera [Multivitamin 1 tab PO DAILY 12/14/17 07/31/23





(formulary)]   


 


Donepezil [Aricept] 10 mg PO HS 07/22/18 07/31/23


 


Memantine [Namenda] 10 mg PO BID 07/22/18 07/31/23


 


traZODone HCL 50 mg PO HS 06/08/19 07/31/23


 


Sertraline [Zoloft] 75 mg PO HS 01/22/20 07/31/23


 


Benralizumab [Fasenra] 30 mg SQ Q56D 03/28/20 07/31/23


 


Carbidopa-Levodopa  mg 1 tab PO TID 08/24/22 07/31/23





[Sinemet  mg]   


 


Vit C/E/Zn/Coppr/Lutein/Zeaxan 1 cap PO BID 08/24/22 07/31/23





[Preservision Areds 2 Softgel]   


 


busPIRone HCl [Buspar] 10 mg PO DAILY 08/24/22 07/31/23


 


Cholecalciferol [Vitamin D3 (25 25 mcg PO DAILY 12/09/22 07/31/23





Mcg = 1000 Iu)]   


 


Acetaminophen [Tylenol Extra 500 mg PO DAILY 07/31/23 07/31/23





Strength]   


 


LORazepam [Ativan] 0.5 mg PO BID 07/31/23 07/31/23








                                  Previous Rx's











 Medication  Instructions  Recorded


 


Nitroglycerin Sl Tabs [Nitrostat] 0.4 mg SUBLINGUAL Q5M PRN #30 tab 08/25/22











                                    Allergies











Allergy/AdvReac Type Severity Reaction Status Date / Time


 


No Known Allergies Allergy   Verified 07/31/23 21:46














Review of Systems


ROS Other: All systems not noted in ROS Statement are negative.





<Nilam Marie - Last Filed: 07/31/23 16:56>


ROS Other: All systems not noted in ROS Statement are negative.





<Elana Faust - Last Filed: 08/01/23 03:05>


ROS Statement: 


Those systems with pertinent positive or pertinent negative responses have been 

documented in the HPI.








Past Medical History


Past Medical History: Asthma, Coronary Artery Disease (CAD), Chest Pain / 

Angina, COPD, Dementia, Diabetes Mellitus, Deep Vein Thrombosis (DVT), 

GERD/Reflux, Hyperlipidemia, Hypertension, Memory Impairment, Myocardial 

Infarction (MI), Osteoarthritis (OA), Pneumonia, Pulmonary Embolus (PE), Skin 

Disorder, Sleep Apnea/CPAP/BIPAP


Additional Past Medical History / Comment(s): Chronic Migraines.  Hx KATHY PE, LT 

DVT 4/2016.  Bruises easily.  Hx Kidney Stones. USES CPAP.  Sees Dr Patel, 

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


Last Myocardial Infarction Date:: 1999 AND 2000


History of Any Multi-Drug Resistant Organisms: None Reported


Past Surgical History: Heart Catheterization With Stent, Joint Replacement


Additional Past Surgical History / Comment(s): 7 STENTS.  LT KNEE REPLACEMENT.  

LT ACHILLES TENDON REPAIR.   bilat [facility] 7/19/16.  Pain proc


Past Anesthesia/Blood Transfusion Reactions: Previous Problems w/ Anesthesia


Additional Past Anesthesia/Blood Transfusion Reaction / Comment(s): DIFFICULTY 

WAKING UP.


Date of Last Stent Placement:: 2013


Past Psychological History: Anxiety, Depression, Panic Disorder


Smoking Status: Former smoker


Past Alcohol Use History: None Reported


Past Drug Use History: None Reported





- Past Family History


  ** Mother


Family Medical History: Coronary Artery Disease (CAD)


Additional Family Medical History / Comment(s): QUAD BYPASS





  ** Brother(s)


Family Medical History: Coronary Artery Disease (CAD), Dementia





  ** Sister(s)


Family Medical History: No Reported History





  ** Daughter(s)


Family Medical History: No Reported History





  ** Son(s)


Family Medical History: No Reported History





<Nilam Marie - Last Filed: 07/31/23 16:56>





General Exam





<Nilam Marie - Last Filed: 07/31/23 16:56>


Limitations: altered mental status, physical limitation


General appearance: alert, in no apparent distress


Head exam: Present: atraumatic, normocephalic, normal inspection


Eye exam: Present: normal appearance, PERRL, EOMI.  Absent: scleral icterus, 

conjunctival injection, periorbital swelling


ENT exam: Present: normal exam, mucous membranes moist


Neck exam: Present: normal inspection.  Absent: tenderness, meningismus, 

lymphadenopathy


Respiratory exam: Present: normal lung sounds bilaterally.  Absent: respiratory 

distress, wheezes, rales, rhonchi, stridor


Cardiovascular Exam: Present: regular rate, normal rhythm, normal heart sounds. 

 Absent: systolic murmur, diastolic murmur, rubs, gallop, clicks


GI/Abdominal exam: Present: soft, normal bowel sounds.  Absent: distended, 

tenderness, guarding, rebound, rigid


Extremities exam: Present: normal inspection, full ROM, normal capillary refill.

  Absent: tenderness, pedal edema, joint swelling, calf tenderness


Back exam: Present: normal inspection


Neurological exam: Present: alert, CN II-XII intact


Psychiatric exam: Present: agitated, flat affect


Skin exam: Present: warm, dry, intact, normal color.  Absent: rash





<Elana Faust - Last Filed: 08/01/23 03:05>





- General Exam Comments


Initial Comments: 


Visual Physical Exam





Vital signs reviewed





General: Well-appearing, nontoxic, no acute distress.


Head: Normocephalic, atraumatic


Eyes: PERRLA, EOMI


ENT: Airway patent


Chest: Nonlabored breathing


Skin: No visual rash, normal skin tone


Neuro: Alert and oriented 3


Musculoskeletal: No gross abnormalities


 (Nilam Marie)





Course


                                   Vital Signs











  07/31/23 07/31/23





  16:53 23:49


 


Temperature 98.3 F 


 


Pulse Rate 58 L 72


 


Respiratory 16 20





Rate  


 


Blood Pressure 146/72 163/73


 


O2 Sat by Pulse 99 97





Oximetry  














Medical Decision Making





<Nilam Marie - Last Filed: 07/31/23 16:56>





- Lab Data


Result diagrams: 


                                 07/31/23 18:25





                                 07/31/23 18:25





<Elana Faust - Last Filed: 08/01/23 03:05>





- Medical Decision Making


I performed the QuickNote portion of this chart - Nilam Marie PA-C


 (Nilam Marie)


Was pt. sent in by a medical professional or institution ([facility], NP, urgent 

care, hospital, or nursing home...) When possible be specific


@  -No


Did you speak to anyone other than the patient for history (EMS, parent, family,

 police, friend...)? What history was obtained from this source 


@  -I spoke with the patient's wife in regards to his symptoms


Did you review nursing and triage notes (agree or disagree)?  Why? 


@  -I reviewed and agree with nursing and triage notes


Were old charts reviewed (outside hosp., previous admission, EMS record, old 

EKG, old radiological studies, urgent care reports/EKG's, nursing home records)?

 Report findings 


@  -No old charts were reviewed


Differential Diagnosis (chest pain, altered mental status, abdominal pain women,

 abdominal pain men, vaginal bleeding, weakness, fever, dyspnea, syncope, 

headache, dizziness, GI bleed, back pain, seizure, CVA, palpatations, mental he

alth, musculoskeletal)? 


@  -Differential Altered Mental Status:


Hypoglycemia, DKA, hypercapnia, ETOH, overdose, CO poisoning, trauma, myxedema 

coma, HTN encephalopathy, infection, encephalitis, psychosis, intercranial 

hemorrhage, hepatic encephalopathy, meningitis, CVA, this is not meant to be an 

all-inclusive list


EKG interpreted by me (3pts min.).


@  -Yes and demonstrates sinus rhythm rate of 61. [place] interval 181.  .  

.  No acute ST segment elevations or depressions.  Baseline artifact


X-rays interpreted by me (1pt min.).


@  -Yes and demonstrates streaky atelectasis


CT interpreted by me (1pt min.).


@  -Yes and demonstrates no acute intracranial process


U/S interpreted by me (1pt. min.).


@  -None done


What testing was considered but not performed or refused? (CT, X-rays, U/S, 

labs)? Why?


@  -None


What meds were considered but not given or refused? Why?


@  -None


Did you discuss the management of the patient with other professionals 

(professionals i.e. , PA, NP, lab, RT, psych nurse, , , 

teacher, , )? Give summary


@  -Spoke with Rosita from [facility] who agreed to admit the patient


Was smoking cessation discussed for >3mins.?


@  -No


Was critical care preformed (if so, how long)?


@  -No


Were there social determinants of health that impacted care today? How? 

(Homelessness, low income, unemployed, alcoholism, drug addiction, 

transportation, low edu. Level, literacy, decrease access to med. care, long-term, 

rehab)?


@  -No


Was there de-escalation of care discussed even if they declined (Discuss DNR or 

withdrawal of care, Hospice)? DNR status


@  -No


What co-morbidities impacted this encounter? (DM, HTN, Smoking, COPD, CAD, 

Cancer, CVA, ARF, Chemo, Hep., AIDS, mental health diagnosis, sleep apnea, 

morbid obesity)?


@  -Dementia


Was patient admitted / discharged? Hospital course, mention meds given and 

route, prescriptions, significant lab abnormalities, going to OR and other 

pertinent info.


@  -Upon arrival patient was placed into room 18.  A thorough history and 

physical exam was performed.  HPI is limited as the patient is nonverbal.  

Laboratory studies are conducted.  Patient does require straight cath.  CT of 

the brain is performed.  Laboratory studies are within normal limits.  CT does 

not demonstrate any acute process.  Results are discussed with the wife.  As he 

is unsafe at home due to his difficulty with ambulation and aggression, patient 

will be admitted.  Spoke with Rosita who will admit the patient.  We'll place 

case management on consult as well as neurology.  Wife was agreeable to this as 

she is attempting to pursue long-term placement.  Patient pending a bed on the 

floor in stable condition 


Undiagnosed new problem with uncertain prognosis?


@  -Yes


Drug Therapy requiring intensive monitoring for toxicity (Heparin, Nitro, 

Insulin, Cardizem)?


@  -No


Were any procedures done?


@  -No


Diagnosis/symptom?


@  -Acute encephalopathy, possible medication side effect


Acute, or Chronic, or Acute on Chronic?


@  -Acute


Uncomplicated (without systemic symptoms) or Complicated (systemic symptoms)?


@  -Complicated


Side effects of treatment?


@  -No


Exacerbation, Progression, or Severe Exacerbation?


@  -No


Poses a threat to life or bodily function? How? (Chest pain, USA, MI, pneumonia,

 PE, COPD, DKA, ARF, appy, cholecystitis, CVA, Diverticulitis, Homicidal, 

Suicidal, threat to staff... and all critical care pts)


@  -No (Elana Faust)





- Lab Data


                                   Lab Results











  07/31/23 07/31/23 07/31/23 Range/Units





  18:25 18:25 18:25 


 


WBC  7.2    (3.8-10.6)  k/uL


 


RBC  4.72    (4.30-5.90)  m/uL


 


Hgb  14.2    (13.0-17.5)  gm/dL


 


Hct  43.3    (39.0-53.0)  %


 


MCV  91.8    (80.0-100.0)  fL


 


MCH  30.1    (25.0-35.0)  pg


 


MCHC  32.8    (31.0-37.0)  g/dL


 


RDW  13.2    (11.5-15.5)  %


 


Plt Count  166    (150-450)  k/uL


 


MPV  7.4    


 


Neutrophils %  65    %


 


Lymphocytes %  25    %


 


Monocytes %  8    %


 


Eosinophils %  0    %


 


Basophils %  0    %


 


Neutrophils #  4.7    (1.3-7.7)  k/uL


 


Lymphocytes #  1.8    (1.0-4.8)  k/uL


 


Monocytes #  0.6    (0-1.0)  k/uL


 


Eosinophils #  0.0    (0-0.7)  k/uL


 


Basophils #  0.0    (0-0.2)  k/uL


 


Sodium   143   (137-145)  mmol/L


 


Potassium   3.8   (3.5-5.1)  mmol/L


 


Chloride   108 H   ()  mmol/L


 


Carbon Dioxide   25   (22-30)  mmol/L


 


Anion Gap   10   mmol/L


 


BUN   24 H   (9-20)  mg/dL


 


Creatinine   0.73   (0.66-1.25)  mg/dL


 


Est GFR (CKD-EPI)AfAm   >90   (>60 ml/min/1.73 sqM)  


 


Est GFR (CKD-EPI)NonAf   86   (>60 ml/min/1.73 sqM)  


 


Glucose   136 H   (74-99)  mg/dL


 


Plasma Lactic Acid Ernie    1.2  (0.7-2.0)  mmol/L


 


Calcium   9.6   (8.4-10.2)  mg/dL


 


Magnesium   2.2   (1.6-2.3)  mg/dL


 


Total Bilirubin   0.8   (0.2-1.3)  mg/dL


 


AST   35   (17-59)  U/L


 


ALT   27   (4-49)  U/L


 


Alkaline Phosphatase   96   ()  U/L


 


Total Protein   7.1   (6.3-8.2)  g/dL


 


Albumin   4.4   (3.5-5.0)  g/dL


 


Lipase   280   ()  U/L


 


Urine Color     


 


Urine Appearance     (Clear)  


 


Urine pH     (5.0-8.0)  


 


Ur Specific Gravity     (1.001-1.035)  


 


Urine Protein     (Negative)  


 


Urine Glucose (UA)     (Negative)  


 


Urine Ketones     (Negative)  


 


Urine Blood     (Negative)  


 


Urine Nitrite     (Negative)  


 


Urine Bilirubin     (Negative)  


 


Urine Urobilinogen     (<2.0)  mg/dL


 


Ur Leukocyte Esterase     (Negative)  


 


Coronavirus (PCR)     (Not Detectd)  














  07/31/23 07/31/23 Range/Units





  18:25 21:30 


 


WBC    (3.8-10.6)  k/uL


 


RBC    (4.30-5.90)  m/uL


 


Hgb    (13.0-17.5)  gm/dL


 


Hct    (39.0-53.0)  %


 


MCV    (80.0-100.0)  fL


 


MCH    (25.0-35.0)  pg


 


MCHC    (31.0-37.0)  g/dL


 


RDW    (11.5-15.5)  %


 


Plt Count    (150-450)  k/uL


 


MPV    


 


Neutrophils %    %


 


Lymphocytes %    %


 


Monocytes %    %


 


Eosinophils %    %


 


Basophils %    %


 


Neutrophils #    (1.3-7.7)  k/uL


 


Lymphocytes #    (1.0-4.8)  k/uL


 


Monocytes #    (0-1.0)  k/uL


 


Eosinophils #    (0-0.7)  k/uL


 


Basophils #    (0-0.2)  k/uL


 


Sodium    (137-145)  mmol/L


 


Potassium    (3.5-5.1)  mmol/L


 


Chloride    ()  mmol/L


 


Carbon Dioxide    (22-30)  mmol/L


 


Anion Gap    mmol/L


 


BUN    (9-20)  mg/dL


 


Creatinine    (0.66-1.25)  mg/dL


 


Est GFR (CKD-EPI)AfAm    (>60 ml/min/1.73 sqM)  


 


Est GFR (CKD-EPI)NonAf    (>60 ml/min/1.73 sqM)  


 


Glucose    (74-99)  mg/dL


 


Plasma Lactic Acid Ernie    (0.7-2.0)  mmol/L


 


Calcium    (8.4-10.2)  mg/dL


 


Magnesium    (1.6-2.3)  mg/dL


 


Total Bilirubin    (0.2-1.3)  mg/dL


 


AST    (17-59)  U/L


 


ALT    (4-49)  U/L


 


Alkaline Phosphatase    ()  U/L


 


Total Protein    (6.3-8.2)  g/dL


 


Albumin    (3.5-5.0)  g/dL


 


Lipase    ()  U/L


 


Urine Color   Yellow  


 


Urine Appearance   Clear  (Clear)  


 


Urine pH   5.5  (5.0-8.0)  


 


Ur Specific Gravity   1.024  (1.001-1.035)  


 


Urine Protein   Negative  (Negative)  


 


Urine Glucose (UA)   Negative  (Negative)  


 


Urine Ketones   Negative  (Negative)  


 


Urine Blood   Negative  (Negative)  


 


Urine Nitrite   Negative  (Negative)  


 


Urine Bilirubin   Negative  (Negative)  


 


Urine Urobilinogen   <2.0  (<2.0)  mg/dL


 


Ur Leukocyte Esterase   Negative  (Negative)  


 


Coronavirus (PCR)  Not Detected   (Not Detectd)  














Disposition





<Nilam Marie - Last Filed: 07/31/23 16:56>


Is patient prescribed a controlled substance at d/c from ED?: No


Time of Disposition: 22:41


Decision to Admit Reason: Admit from EC


Decision Date: 07/31/23


Decision Time: 22:41





<Elana Faust - Last Filed: 08/01/23 03:05>


Clinical Impression: 


 Acute encephalopathy, Medication side effect





Disposition: ADMITTED AS IP TO THIS HOSP


Condition: Stable

## 2023-07-31 NOTE — CT
EXAM:

  CT Head Without Intravenous Contrast

 

CLINICAL HISTORY:

  ams

 

TECHNIQUE:

  Axial computed tomography images of the head/brain without intravenous 

contrast.  CTDI is 49.27 mGy and DLP is 1104.4 mGy-cm.  This CT exam was 

performed using one or more of the following dose reduction techniques: 

automated exposure control, adjustment of the mA and/or kV according to 

patient size, and/or use of iterative reconstruction technique.

 

COMPARISON:

  5/3/2023

 

FINDINGS:

  Brain:  No acute hemorrhage or abnormal extra-axial fluid collection.  

No acute stroke.  Mild supratentorial periventricular and subcortical 

white matter changes.  Age-appropriate generalized atrophy.

  Ventricles:  No hydrocephalus.  No midline shift.

  Bones/joints:  Unremarkable.  No acute fracture.

  Soft tissues:  Unremarkable.

  Sinuses:  Unremarkable as visualized.  No acute sinusitis.

 

IMPRESSION:     

  No acute abnormality.

## 2023-07-31 NOTE — XR
EXAMINATION TYPE: XR chest 2V

 

DATE OF EXAM: 7/31/2023

 

COMPARISON: 5/3/2023

 

HISTORY: 83-year-old male with weakness

 

TECHNIQUE:  AP and lateral views

 

FINDINGS:  

Heart limits of normal in size. Atherosclerotic arch calcifications. Some strandy atelectasis in the 
lower lungs. No stna consolidation or pleural effusion.  Dish mid and lower thoracic spine.

 

 

IMPRESSION:  

Borderline heart size. Some strandy bibasilar atelectasis. No definite acute process.

## 2023-08-01 LAB — VIT B12 SERPL-MCNC: 702 PG/ML (ref 200–944)

## 2023-08-01 RX ADMIN — I-VITE, TAB 1000-60-2MG (60/BT) SCH EACH: TAB at 08:31

## 2023-08-01 RX ADMIN — THERA TABS SCH EACH: TAB at 08:28

## 2023-08-01 RX ADMIN — FAMOTIDINE SCH MG: 20 TABLET, FILM COATED ORAL at 08:35

## 2023-08-01 RX ADMIN — FAMOTIDINE SCH MG: 20 TABLET, FILM COATED ORAL at 20:53

## 2023-08-01 RX ADMIN — HEPARIN SODIUM SCH UNIT: 5000 INJECTION INTRAVENOUS; SUBCUTANEOUS at 20:52

## 2023-08-01 RX ADMIN — ACETAMINOPHEN SCH MG: 500 TABLET ORAL at 08:29

## 2023-08-01 RX ADMIN — I-VITE, TAB 1000-60-2MG (60/BT) SCH EACH: TAB at 20:53

## 2023-08-01 RX ADMIN — ATORVASTATIN CALCIUM SCH MG: 10 TABLET, FILM COATED ORAL at 20:53

## 2023-08-01 RX ADMIN — CARBIDOPA AND LEVODOPA SCH EACH: 25; 100 TABLET ORAL at 08:29

## 2023-08-01 RX ADMIN — CEFAZOLIN SCH MLS/HR: 330 INJECTION, POWDER, FOR SOLUTION INTRAMUSCULAR; INTRAVENOUS at 11:15

## 2023-08-01 RX ADMIN — MONTELUKAST SODIUM SCH MG: 10 TABLET, FILM COATED ORAL at 20:53

## 2023-08-01 RX ADMIN — SERTRALINE HYDROCHLORIDE SCH MG: 25 TABLET ORAL at 20:52

## 2023-08-01 RX ADMIN — Medication SCH MCG: at 08:30

## 2023-08-01 RX ADMIN — ASPIRIN 81 MG CHEWABLE TABLET SCH MG: 81 TABLET CHEWABLE at 08:29

## 2023-08-01 RX ADMIN — CARBIDOPA AND LEVODOPA SCH EACH: 25; 100 TABLET ORAL at 20:52

## 2023-08-01 RX ADMIN — CARBIDOPA AND LEVODOPA SCH EACH: 25; 100 TABLET ORAL at 16:52

## 2023-08-01 RX ADMIN — CYANOCOBALAMIN TAB 500 MCG SCH MCG: 500 TAB at 08:27

## 2023-08-01 RX ADMIN — DONEPEZIL HYDROCHLORIDE SCH MG: 10 TABLET ORAL at 20:53

## 2023-08-01 RX ADMIN — CEFAZOLIN SCH MLS/HR: 330 INJECTION, POWDER, FOR SOLUTION INTRAMUSCULAR; INTRAVENOUS at 23:32

## 2023-08-01 RX ADMIN — MELOXICAM SCH MG: 7.5 TABLET ORAL at 08:35

## 2023-08-01 RX ADMIN — MEMANTINE HYDROCHLORIDE SCH MG: 10 TABLET ORAL at 20:53

## 2023-08-01 RX ADMIN — MEMANTINE HYDROCHLORIDE SCH MG: 10 TABLET ORAL at 08:29

## 2023-08-01 NOTE — P.HPIM
History of Present Illness


H&P Date: 08/01/23


This is an 83-year-old male with medical history significant for asthma, 

coronary artery disease with prior cardiac stenting, COPD, diabetes mellitus, 

GERD, hypertension, hyponatremia, DVT, pulmonary embolism, sleep apnea and 

advanced dementia.  Patient is nonverbal at baseline does reside with his wife 

was his primary caregiver.  Patient recently has been getting more aggressive 

with his wife who is his primary caregiver and has been started on Rexulti 

outpatient by his primary care provider.  Patient's wife noted the patient has 

become more sedated after being started on this medication.  He was taken to his

day program where he slept all day.  Patient's wife decided to bring him into 

the ER for further evaluation.  Patient does follow with Dr. LUÍS Patel 

outpatient.  Patient is unable to provide a review of symptoms as he is 

nonverbal and mumbling.  He does not appear to be in any acute distress.  He 

does follow directions.  Patient was admitted to the hospital medicine with a 

consult placed to neurology underwent a brain CT which is negative for any acute

findings.  Chest x-ray is negative.  Patient does not appear to be any distress.

 Lab work is essentially unremarkable, his chloride is 108, BUN 24, creatinine 

0.73, glucose 136, vitamin B12 702, folate 40, TSH 1.150, urinalysis is 

negative.  Blood count panel is completely unremarkable. EEG is currently 

pending and PT/OT have been consulted. 





As mentioned above in PCI patient is unable to provided a review of systems.





PHYSICAL EXAMINATION: 





GENERAL: The patient is alert and oriented x0-1, not in any acute distress. Well

developed, well nourished. 


HEENT: Pupils are round and equally reacting to light. EOMI. No scleral icterus.

No conjunctival pallor. Normocephalic, atraumatic. No pharyngeal erythema. No 

thyromegaly. 


CARDIOVASCULAR: S1 and S2 present. No murmurs, rubs, or gallops. 


PULMONARY: Chest is clear to auscultation, no wheezing or crackles. 


ABDOMEN: Soft, nontender, nondistended, normoactive bowel sounds. No palpable 

organomegaly. 


MUSCULOSKELETAL: No joint swelling or deformity.


EXTREMITIES: No cyanosis, clubbing, or pedal edema. 


NEUROLOGICAL: Gross neurological examination did not reveal any focal deficits. 


SKIN: No rashes. 





Assessment


Altered mental status due to toxic  encephalopathy from Rexulti causing an over 

sedative effect 


Advanced dementia nonverbal at baseline with behavioral changes follows with a 

neurologist outpatient 


History of coronary artery disease with prior cardiac stenting


History of COPD


Diabetes Mellitus Type 2 


GERD


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 


History of myocardial infarction


Hypertension


Hyperlipidemia


Anxiety/depression/panic disorder


Former smoker


GI prophylaxis on pepcid


DVT prophylaxis Subcu heparin





Plan


PT/OT have been consulted for discharge planning 


Social work following


Neurology consultation in place recommending EEG and pending patients clinical 

improvement may need to undergo MRI


Rexulti will be held at this time and consider low dose seroquel for episodes of

acute agitation


Resumed on all other home medications


D/C in the next 24 hours 





The impression and plan of care has been dictated by Rosita Macias Nurse 

Practitioner as directed.





Dr. Karli MD


I have performed a history and physical examination and medical decision making 

of this patient, discussed the same with the dictator, and agree with the 

dictators assessment and plan as written, documented as a scribe. Based on total

visit time, I have performed more than 50% of this visit. 








Past Medical History


Past Medical History: Asthma, Coronary Artery Disease (CAD), Chest Pain / 

Angina, COPD, Dementia, Diabetes Mellitus, Deep Vein Thrombosis (DVT), 

GERD/Reflux, Hyperlipidemia, Hypertension, Memory Impairment, Myocardial 

Infarction (MI), Osteoarthritis (OA), Pneumonia, Pulmonary Embolus (PE), Skin 

Disorder, Sleep Apnea/CPAP/BIPAP


Additional Past Medical History / Comment(s): Chronic Migraines.  Hx KATHY PE, LT 

DVT 4/2016.  Bruises easily.  Hx Kidney Stones. USES CPAP.  Sees Dr Patel, 

DEMENTIA, RECENT OVERNIGHT STAY AT [facility] FOR TOXIC METABOLIC ENCEPHALOPATHY


Last Myocardial Infarction Date:: 1999 AND 2000


History of Any Multi-Drug Resistant Organisms: None Reported


Past Surgical History: Heart Catheterization With Stent, Joint Replacement


Additional Past Surgical History / Comment(s): 7 STENTS.  LT KNEE REPLACEMENT.  

LT ACHILLES TENDON REPAIR.   bilat CATARACT 7/19/16.  Pain proc


Past Anesthesia/Blood Transfusion Reactions: Previous Problems w/ Anesthesia


Additional Past Anesthesia/Blood Transfusion Reaction / Comment(s): DIFFICULTY 

WAKING UP.


Date of Last Stent Placement:: 2013


Past Psychological History: Anxiety, Depression, Panic Disorder


Smoking Status: Former smoker


Past Alcohol Use History: None Reported


Past Drug Use History: None Reported





- Past Family History


  ** Mother


Family Medical History: Coronary Artery Disease (CAD)


Additional Family Medical History / Comment(s): QUAD BYPASS





  ** Brother(s)


Family Medical History: Coronary Artery Disease (CAD), Dementia





  ** Sister(s)


Family Medical History: No Reported History





  ** Daughter(s)


Family Medical History: No Reported History





  ** Son(s)


Family Medical History: No Reported History





Medications and Allergies


                                Home Medications











 Medication  Instructions  Recorded  Confirmed  Type


 


Famotidine 40 mg PO BID 09/30/14 07/31/23 History


 


atenoloL [Tenormin] 25 mg PO DAILY 09/30/14 07/31/23 History


 


lisinopriL [Zestril] 2.5 mg PO HS 09/30/14 07/31/23 History


 


metFORMIN HCL [Glucophage] 500 mg PO HS 09/30/14 07/31/23 History


 


Aspirin 81 mg PO DAILY 03/25/16 07/31/23 History


 


Albuterol Inhaler [Ventolin Hfa 1 puff INHALATION RT-Q6H PRN 07/18/16 07/31/23 

History





Inhaler]    


 


Cyanocobalamin [Vitamin B-12] 250 mcg PO DAILY 07/18/16 07/31/23 History


 


Lovastatin [Mevacor] 40 mg PO HS 09/19/16 07/31/23 History


 


Meloxicam 15 mg PO DAILY 12/14/17 07/31/23 History


 


Montelukast [Singulair] 10 mg PO HS 12/14/17 07/31/23 History


 


Multivitamins, Thera [Multivitamin 1 tab PO DAILY 12/14/17 07/31/23 History





(formulary)]    


 


Donepezil [Aricept] 10 mg PO HS 07/22/18 07/31/23 History


 


Memantine [Namenda] 10 mg PO BID 07/22/18 07/31/23 History


 


traZODone HCL 50 mg PO HS 06/08/19 07/31/23 History


 


Sertraline [Zoloft] 75 mg PO HS 01/22/20 07/31/23 History


 


Benralizumab [Fasenra] 30 mg SQ Q56D 03/28/20 07/31/23 History


 


Carbidopa-Levodopa  mg 1 tab PO TID 08/24/22 07/31/23 History





[Sinemet  mg]    


 


Vit C/E/Zn/Coppr/Lutein/Zeaxan 1 cap PO BID 08/24/22 07/31/23 History





[Preservision Areds 2 Softgel]    


 


busPIRone HCl [Buspar] 10 mg PO DAILY 08/24/22 07/31/23 History


 


Nitroglycerin Sl Tabs [Nitrostat] 0.4 mg SUBLINGUAL Q5M PRN #30 tab 08/25/22 07/31/23 Rx


 


Cholecalciferol [Vitamin D3 (25 25 mcg PO DAILY 12/09/22 07/31/23 History





Mcg = 1000 Iu)]    


 


Acetaminophen [Tylenol Extra 500 mg PO DAILY 07/31/23 07/31/23 History





Strength]    


 


LORazepam [Ativan] 0.5 mg PO BID 07/31/23 07/31/23 History








                                    Allergies











Allergy/AdvReac Type Severity Reaction Status Date / Time


 


No Known Allergies Allergy   Verified 07/31/23 21:46














Physical Exam


Vitals: 


                                   Vital Signs











  Temp Pulse Resp BP Pulse Ox


 


 08/01/23 08:25   85  17  157/79  94 L


 


 08/01/23 03:41    16  


 


 07/31/23 23:49   72  20  163/73  97


 


 07/31/23 16:53  98.3 F  58 L  16  146/72  99








                                Intake and Output











 07/31/23 08/01/23 08/01/23





 22:59 06:59 14:59


 


Other:   


 


  Weight 81.647 kg  














Results


CBC & Chem 7: 


                                 07/31/23 18:25





                                 07/31/23 18:25


Labs: 


                  Abnormal Lab Results - Last 24 Hours (Table)











  07/31/23 Range/Units





  18:25 


 


Chloride  108 H  ()  mmol/L


 


BUN  24 H  (9-20)  mg/dL


 


Glucose  136 H  (74-99)  mg/dL














Assessment and Plan


Time with Patient: Less than 30

## 2023-08-01 NOTE — EEG
ELECTROENCEPHALOGRAM REPORT



CLINICAL HISTORY:

This is an 83-year-old gentleman with history of dementia, who has altered 
mental

status.  The video EEG is obtained to evaluate for seizure epileptiform 
activity.



RELEVANT MEDICATION:

Memantine.



EEG TYPE:

A routine 21-channel EEG is performed with video using the 10/20 electrode 
placement

system.



DESCRIPTION:

Background consists of low-to-moderate voltage of 5 to 6 hertz.  At times, the

background consists of diffuse nonrhythmic delta activity.  There is no 
physiological

stage 2 sleep architecture.



There is no focal slowing.



Interictal and ictal is none.



ACTIVATION PROCEDURE:

Photic stimulation and hyperventilation are not performed.



CLINICAL INTERPRETATION:

This is an abnormal routine EEG.  The background slowing is suggestive of 
moderate

encephalopathy.  Otherwise, there is no focal slowing, epileptiform discharge, 
or

seizure on the EEG.  Clinical correlation is recommended.





RABIA / MUNIR: [id number] / Job#: [id number]

MTDD

## 2023-08-01 NOTE — P.CNNES
History of Present Illness


Consult date: 08/01/23


Requesting physician: Elana Faust


Reason for Consult: acute encephalopathy, hx of dementia


History of Present Illness: 


This is an 83-year-old gentleman with history of dementia, diabetes, 

hypertension, pulmonary embolism on multiple other medical problems who 

presented emergency department because of altered mental status.  History is 

obtained from medical record.  Per the ED note the wife notified the ED team she

has underlying dementia and nonverbal and seems the patient has been the more 

sedated difficulty awakening yesterday morning and unable to participate in 

activity.  It seems that he was seen by his primary care physician a week ago 

and he was placed on Rexulti.  Currently, he is sitting with a sitter and is 

awake.  Per nursing staff he is nonverbal at baseline.





Some of the workup during his hospital visit consisted of:


CBC with differential is unremarkable


Ammonia level is less than 9.  Plasma-Lyte gasoline is 1.2.


Corona virus PCR was not detected.


Urinalysis is not detected.


CT of the head is reported as no acute abnormality.  I personally reviewed the 

CT of the head and there is no acute or subacute ischemia or any bleed.  I feel 

the patient has generalized atrophy atrophy for the patient's age.











Review of Systems


Review of system is limited but the prone positive and negative as per HPI








Past Medical History


Past Medical History: Asthma, Coronary Artery Disease (CAD), Chest Pain / 

Angina, COPD, Dementia, Diabetes Mellitus, Deep Vein Thrombosis (DVT), 

GERD/Reflux, Hyperlipidemia, Hypertension, Memory Impairment, Myocardial 

Infarction (MI), Osteoarthritis (OA), Pneumonia, Pulmonary Embolus (PE), Skin 

Disorder, Sleep Apnea/CPAP/BIPAP


Additional Past Medical History / Comment(s): Chronic Migraines.  Hx KATHY PE, LT 

DVT 4/2016.  Bruises easily.  Hx Kidney Stones. USES CPAP.  Sees Dr Patel, 

DEMENTIA, RECENT OVERNIGHT STAY AT [facility] FOR TOXIC METABOLIC ENCEPHALOPATHY


Last Myocardial Infarction Date:: 1999 AND 2000


History of Any Multi-Drug Resistant Organisms: None Reported


Past Surgical History: Heart Catheterization With Stent, Joint Replacement


Additional Past Surgical History / Comment(s): 7 STENTS.  LT KNEE REPLACEMENT.  

LT ACHILLES TENDON REPAIR.   bilat CATARACT 7/19/16.  Pain proc


Past Anesthesia/Blood Transfusion Reactions: Previous Problems w/ Anesthesia


Additional Past Anesthesia/Blood Transfusion Reaction / Comment(s): DIFFICULTY 

WAKING UP.


Date of Last Stent Placement:: 2013


Past Psychological History: Anxiety, Depression, Panic Disorder


Smoking Status: Former smoker


Past Alcohol Use History: None Reported


Past Drug Use History: None Reported





- Past Family History


  ** Mother


Family Medical History: Coronary Artery Disease (CAD)


Additional Family Medical History / Comment(s): QUAD BYPASS





  ** Brother(s)


Family Medical History: Coronary Artery Disease (CAD), Dementia





  ** Sister(s)


Family Medical History: No Reported History





  ** Daughter(s)


Family Medical History: No Reported History





  ** Son(s)


Family Medical History: No Reported History





Medications and Allergies


                                Home Medications











 Medication  Instructions  Recorded  Confirmed  Type


 


Famotidine 40 mg PO BID 09/30/14 07/31/23 History


 


atenoloL [Tenormin] 25 mg PO DAILY 09/30/14 07/31/23 History


 


lisinopriL [Zestril] 2.5 mg PO HS 09/30/14 07/31/23 History


 


metFORMIN HCL [Glucophage] 500 mg PO HS 09/30/14 07/31/23 History


 


Aspirin 81 mg PO DAILY 03/25/16 07/31/23 History


 


Albuterol Inhaler [Ventolin Hfa 1 puff INHALATION RT-Q6H PRN 07/18/16 07/31/23 

History





Inhaler]    


 


Cyanocobalamin [Vitamin B-12] 250 mcg PO DAILY 07/18/16 07/31/23 History


 


Lovastatin [Mevacor] 40 mg PO HS 09/19/16 07/31/23 History


 


Meloxicam 15 mg PO DAILY 12/14/17 07/31/23 History


 


Montelukast [Singulair] 10 mg PO HS 12/14/17 07/31/23 History


 


Multivitamins, Thera [Multivitamin 1 tab PO DAILY 12/14/17 07/31/23 History





(formulary)]    


 


Donepezil [Aricept] 10 mg PO HS 07/22/18 07/31/23 History


 


Memantine [Namenda] 10 mg PO BID 07/22/18 07/31/23 History


 


traZODone HCL 50 mg PO HS 06/08/19 07/31/23 History


 


Sertraline [Zoloft] 75 mg PO HS 01/22/20 07/31/23 History


 


Benralizumab [Fasenra] 30 mg SQ Q56D 03/28/20 07/31/23 History


 


Carbidopa-Levodopa  mg 1 tab PO TID 08/24/22 07/31/23 History





[Sinemet  mg]    


 


Vit C/E/Zn/Coppr/Lutein/Zeaxan 1 cap PO BID 08/24/22 07/31/23 History





[Preservision Areds 2 Softgel]    


 


busPIRone HCl [Buspar] 10 mg PO DAILY 08/24/22 07/31/23 History


 


Nitroglycerin Sl Tabs [Nitrostat] 0.4 mg SUBLINGUAL Q5M PRN #30 tab 08/25/22 07/31/23 Rx


 


Cholecalciferol [Vitamin D3 (25 25 mcg PO DAILY 12/09/22 07/31/23 History





Mcg = 1000 Iu)]    


 


Acetaminophen [Tylenol Extra 500 mg PO DAILY 07/31/23 07/31/23 History





Strength]    


 


LORazepam [Ativan] 0.5 mg PO BID 07/31/23 07/31/23 History








                                    Allergies











Allergy/AdvReac Type Severity Reaction Status Date / Time


 


No Known Allergies Allergy   Verified 07/31/23 21:46














Physical Examination





- Vital Signs


Vital Signs: 


                                   Vital Signs











  Temp Pulse Resp BP Pulse Ox


 


 08/01/23 08:25   85  17  157/79  94 L


 


 08/01/23 03:41    16  


 


 07/31/23 23:49   72  20  163/73  97


 


 07/31/23 16:53  98.3 F  58 L  16  146/72  99








                                Intake and Output











 07/31/23 08/01/23 08/01/23





 22:59 06:59 14:59


 


Other:   


 


  Weight 81.647 kg  











GENERAL: The patient is sitting in a recliner chair and does not appear in acute

 distress.  He is accompanied with sitter.  





NEUROLOGICAL:


Limited since he is non-verbal at baseline.


Is awake, alert.  Mumbling and could not understand him.  Not following 

commands.  


Pupils are round, equal and reactive to light.  He slightly tracks to right and 

left.  No facial weakness.


Motor: Moves his hands on his own spontaneously.  Hard to assess individual 

muscle strength.  Could not assess tone because of resistance upon assessing.


Rest is unable to assess because of cooperation.











Results





- Laboratory Findings


CBC and BMP: 


                                 07/31/23 18:25





                                 07/31/23 18:25


Abnormal Lab Findings: 


                                  Abnormal Labs











  07/31/23





  18:25


 


Chloride  108 H


 


BUN  24 H


 


Glucose  136 H














Assessment and Plan


Assessment: 


This is an 83-year-old gentleman with underlying history of dementia, non-verbal

 at baseline who presented emergency department because of increased the 

confusion and been more sleepy.  Patient was started on Rexulti a week ago by 

his PCP.





Encephalopathy likely due to medication effect (recently started Rexulti)--he is

 more awake currently


Underlying history of dementia and is non-verabal.


History of coronary artery disease s/p stent.


Hypertension


Hyperlipidemia


History of sleep apnea








Plan: 


I ordered a routine EEG to rule out any underlying seizure or discharges.  The 

patient CTA head was negative for any acute or subacute strokes.


I ordered TSH, vitamin B-12, folate.


If patient continues to be severely drowsy and not improving then now we'll 

pursue with MRI the brain to rule out any acute or subacute ischemia


Recommend holding off Rexulti.


We'll defer the rest of the medical measure the primary team





Thank you for the consultation.





Time with Patient: Greater than 30

## 2023-08-02 RX ADMIN — CARBIDOPA AND LEVODOPA SCH EACH: 25; 100 TABLET ORAL at 15:34

## 2023-08-02 RX ADMIN — CEFAZOLIN SCH: 330 INJECTION, POWDER, FOR SOLUTION INTRAMUSCULAR; INTRAVENOUS at 11:43

## 2023-08-02 RX ADMIN — I-VITE, TAB 1000-60-2MG (60/BT) SCH EACH: TAB at 22:48

## 2023-08-02 RX ADMIN — DONEPEZIL HYDROCHLORIDE SCH MG: 10 TABLET ORAL at 22:48

## 2023-08-02 RX ADMIN — MONTELUKAST SODIUM SCH MG: 10 TABLET, FILM COATED ORAL at 22:49

## 2023-08-02 RX ADMIN — FAMOTIDINE SCH MG: 20 TABLET, FILM COATED ORAL at 22:49

## 2023-08-02 RX ADMIN — HEPARIN SODIUM SCH UNIT: 5000 INJECTION INTRAVENOUS; SUBCUTANEOUS at 08:30

## 2023-08-02 RX ADMIN — HEPARIN SODIUM SCH UNIT: 5000 INJECTION INTRAVENOUS; SUBCUTANEOUS at 22:49

## 2023-08-02 RX ADMIN — SERTRALINE HYDROCHLORIDE SCH MG: 25 TABLET ORAL at 22:49

## 2023-08-02 RX ADMIN — CARBIDOPA AND LEVODOPA SCH EACH: 25; 100 TABLET ORAL at 22:48

## 2023-08-02 RX ADMIN — FAMOTIDINE SCH MG: 20 TABLET, FILM COATED ORAL at 08:30

## 2023-08-02 RX ADMIN — I-VITE, TAB 1000-60-2MG (60/BT) SCH EACH: TAB at 08:30

## 2023-08-02 RX ADMIN — THERA TABS SCH EACH: TAB at 08:30

## 2023-08-02 RX ADMIN — ASPIRIN 81 MG CHEWABLE TABLET SCH MG: 81 TABLET CHEWABLE at 08:29

## 2023-08-02 RX ADMIN — CYANOCOBALAMIN TAB 500 MCG SCH MCG: 500 TAB at 08:30

## 2023-08-02 RX ADMIN — ACETAMINOPHEN SCH MG: 500 TABLET ORAL at 08:30

## 2023-08-02 RX ADMIN — Medication SCH MCG: at 08:30

## 2023-08-02 RX ADMIN — MELOXICAM SCH MG: 7.5 TABLET ORAL at 08:52

## 2023-08-02 RX ADMIN — MEMANTINE HYDROCHLORIDE SCH MG: 10 TABLET ORAL at 22:48

## 2023-08-02 RX ADMIN — ATORVASTATIN CALCIUM SCH MG: 10 TABLET, FILM COATED ORAL at 22:48

## 2023-08-02 RX ADMIN — MEMANTINE HYDROCHLORIDE SCH MG: 10 TABLET ORAL at 08:53

## 2023-08-02 RX ADMIN — CARBIDOPA AND LEVODOPA SCH EACH: 25; 100 TABLET ORAL at 08:53

## 2023-08-02 NOTE — P.PN
Subjective


Progress Note Date: 08/02/23


Patient is seen at bedside and per the patient nurse is about baseline.  Per 

nurse he had his breakfast today and smiled.  Upon seeing him he was laying in 

bed.








Objective





- Vital Signs


Vital signs: 


                                   Vital Signs











Temp  97.4 F L  08/02/23 08:30


 


Pulse  69   08/02/23 08:30


 


Resp  16   08/02/23 08:30


 


BP  122/71   08/02/23 08:30


 


Pulse Ox  98   08/02/23 08:30


 


FiO2      








                                 Intake & Output











 08/01/23 08/02/23 08/02/23





 18:59 06:59 18:59


 


Intake Total 240  


 


Output Total  400 


 


Balance 240 -400 


 


Weight  81.647 kg 


 


Intake:   


 


  Oral 240  


 


Output:   


 


  Urine  400 


 


Other:   


 


  Voiding Method Diaper Diaper Diaper





 Incontinent Incontinent Incontinent


 


  # Voids  0 














- Exam


GENERAL: The patient is in bed and does not appear in acute distress.  He is 

accompanied with sitter.  





NEUROLOGICAL:


Limited since he is non-verbal at baseline.


Is awake, alert.  Mumbling and could not understand him.  Not following 

commands.  


Pupils are round, equal and reactive to light.  He slightly tracks to right and 

left.  No facial weakness.


Motor: Moves his hands on his own spontaneously.  Hard to assess individual 

muscle strength.  Could not assess tone because of resistance upon assessing.


Rest is unable to assess because of cooperation.





Some of the workup during his hospital visit consisted of:


CBC with differential is unremarkable


Ammonia level is less than 9.  Plasma-Lyte gasoline is 1.2.


TSH is 1.50, vitamin B12 702 and the folate is 40.


Corona virus PCR was not detected.


Urinalysis is not detected.


CT of the head is reported as no acute abnormality.  I personally reviewed the 

CT of the head and there is no acute or subacute ischemia or any bleed.  I feel 

the patient has generalized atrophy atrophy for the patient's age.


Routine EEG is abnormal.  The background slowing is suggestive of moderate 

encephalopathy.  Otherwise there is no focal slowing, epileptiform discharges or

seizure on the EEG.








- Labs


CBC & Chem 7: 


                                 07/31/23 18:25





                                 07/31/23 18:25


Labs: 


                  Abnormal Lab Results - Last 24 Hours (Table)











  08/01/23 Range/Units





  09:44 


 


Folate  40.00 H  (4.40-31.00)  ng/mL














Assessment and Plan


Assessment: 


This is an 83-year-old gentleman with underlying history of dementia, non-verbal

at baseline who presented emergency department because of increased the 

confusion and been more sleepy.  Patient was started on Rexulti a week ago by 

his PCP.





Encephalopathy likely due to medication effect (recently started Rexulti)--he is

more awake currently


Underlying history of dementia and is non-verabal.


History of coronary artery disease s/p stent.


Hypertension


Hyperlipidemia


History of sleep apnea








Plan: 


Routine EEG shows moderate encephalopathy otherwise no seizure or epileptiform 

discharges.  TSH, vitamin B12 and folate are normal.


If patient continues to be severely drowsy and not improving then now we'll purs

ue with MRI the brain to rule out any acute or subacute ischemia


Recommend holding off Rexulti.


We'll defer the rest of the medical measure the primary team





Otherwise no additional neurological workup is needed.  We'll sign off.  Please 

consult if needed








Time with Patient: Less than 30

## 2023-08-02 NOTE — P.PN
Subjective


Progress Note Date: 08/02/23


This is an 83-year-old male with medical history significant for asthma, 

coronary artery disease with prior cardiac stenting, COPD, diabetes mellitus, 

GERD, hypertension, hyponatremia, DVT, pulmonary embolism, sleep apnea and 

advanced dementia.  Patient is nonverbal at baseline does reside with his wife 

was his primary caregiver.  Patient recently has been getting more aggressive 

with his wife who is his primary caregiver and has been started on Rexulti 

outpatient by his primary care provider.  Patient's wife noted the patient has 

become more sedated after being started on this medication.  He was taken to his

day program where he slept all day.  Patient's wife decided to bring him into 

the ER for further evaluation.  Patient does follow with Dr. LUÍS Patel 

outpatient.  Patient is unable to provide a review of symptoms as he is 

nonverbal and mumbling.  He does not appear to be in any acute distress.  He 

does follow directions.  Patient was admitted to the hospital medicine with a 

consult placed to neurology underwent a brain CT which is negative for any acute

findings.  Chest x-ray is negative.  Patient does not appear to be any distress.

 Lab work is essentially unremarkable, his chloride is 108, BUN 24, creatinine 

0.73, glucose 136, vitamin B12 702, folate 40, TSH 1.150, urinalysis is 

negative.  Blood count panel is completely unremarkable. EEG is currently 

pending and PT/OT have been consulted. 





08/02/2023


Patient is a tall evaluated in the medical floor.  He is more awake and was able

to get up and ambulate with physical therapy.   is on board for 

discharge planning and patient is currently awaiting an accepting facility.  EEG

was completed which showed moderate encephalopathy is no signs of seizure 

activity.





As mentioned above in PCI patient is unable to provided a review of systems.





PHYSICAL EXAMINATION: 





GENERAL: The patient is alert and oriented x0-1, not in any acute distress. Well

developed, well nourished. 


HEENT: Pupils are round and equally reacting to light. EOMI. No scleral icterus.

No conjunctival pallor. Normocephalic, atraumatic. No pharyngeal erythema. No 

thyromegaly. 


CARDIOVASCULAR: S1 and S2 present. No murmurs, rubs, or gallops. 


PULMONARY: Chest is clear to auscultation, no wheezing or crackles. 


ABDOMEN: Soft, nontender, nondistended, normoactive bowel sounds. No palpable 

organomegaly. 


MUSCULOSKELETAL: No joint swelling or deformity.


EXTREMITIES: No cyanosis, clubbing, or pedal edema. 


NEUROLOGICAL: Gross neurological examination did not reveal any focal deficits. 


SKIN: No rashes. 





Assessment


Altered mental status due to toxic  encephalopathy from Rexulti causing an over 

sedative effect 


Advanced dementia nonverbal at baseline with behavioral changes follows with a 

neurologist outpatient 


History of coronary artery disease with prior cardiac stenting


History of COPD


Diabetes Mellitus Type 2 


GERD


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 


History of myocardial infarction


Hypertension


Hyperlipidemia


Anxiety/depression/panic disorder


Former smoker


GI prophylaxis on pepcid


DVT prophylaxis Subcu heparin





Plan


PT/OT have been consulted for discharge planning 


Social work following


Rexulti will be held at this time and consider low dose seroquel for episodes of

acute agitation


Neurology following


D/C in the next 24 hours pending an accepting discharge facility patient not 

able to go home at this time wife unable to care for patient this time he has 

been increasingly weak at home.  He has been denied from [facility] and possibly 

discharge tomorrow pending accepting facility.





The impression and plan of care has been dictated by Nurse Leonid Pra

ctitioner as directed.





Dr. Karli MD


I have performed a history and physical examination and medical decision making 

of this patient, discussed the same with the dictator, and agree with the dictat

ors assessment and plan as written, documented as a scribe. Based on total visit

time, I have performed more than 50% of this visit. 








Objective





- Vital Signs


Vital signs: 


                                   Vital Signs











Temp  97.6 F   08/02/23 14:41


 


Pulse  67   08/02/23 14:41


 


Resp  16   08/02/23 14:41


 


BP  148/72   08/02/23 14:41


 


Pulse Ox  99   08/02/23 14:41


 


FiO2      








                                 Intake & Output











 08/01/23 08/02/23 08/02/23





 18:59 06:59 18:59


 


Intake Total 240  


 


Output Total  400 500


 


Balance 240 -400 -500


 


Weight  81.647 kg 


 


Intake:   


 


  Oral 240  


 


Output:   


 


  Urine  400 500


 


Other:   


 


  Voiding Method Diaper Diaper Diaper





 Incontinent Incontinent Incontinent


 


  # Voids  0 1














- Labs


CBC & Chem 7: 


                                 07/31/23 18:25





                                 07/31/23 18:25


Labs: 


                  Abnormal Lab Results - Last 24 Hours (Table)











  08/01/23 Range/Units





  09:44 


 


Folate  40.00 H  (4.40-31.00)  ng/mL














Assessment and Plan


Time with Patient: Less than 30

## 2023-08-03 RX ADMIN — FAMOTIDINE SCH MG: 20 TABLET, FILM COATED ORAL at 21:35

## 2023-08-03 RX ADMIN — DONEPEZIL HYDROCHLORIDE SCH MG: 10 TABLET ORAL at 21:34

## 2023-08-03 RX ADMIN — I-VITE, TAB 1000-60-2MG (60/BT) SCH EACH: TAB at 10:20

## 2023-08-03 RX ADMIN — ASPIRIN 81 MG CHEWABLE TABLET SCH MG: 81 TABLET CHEWABLE at 10:19

## 2023-08-03 RX ADMIN — I-VITE, TAB 1000-60-2MG (60/BT) SCH EACH: TAB at 21:35

## 2023-08-03 RX ADMIN — MONTELUKAST SODIUM SCH MG: 10 TABLET, FILM COATED ORAL at 21:35

## 2023-08-03 RX ADMIN — FAMOTIDINE SCH MG: 20 TABLET, FILM COATED ORAL at 10:18

## 2023-08-03 RX ADMIN — HEPARIN SODIUM SCH UNIT: 5000 INJECTION, SOLUTION INTRAVENOUS; SUBCUTANEOUS at 21:35

## 2023-08-03 RX ADMIN — ATORVASTATIN CALCIUM SCH MG: 10 TABLET, FILM COATED ORAL at 21:35

## 2023-08-03 RX ADMIN — CARBIDOPA AND LEVODOPA SCH EACH: 25; 100 TABLET ORAL at 17:40

## 2023-08-03 RX ADMIN — Medication SCH MCG: at 10:18

## 2023-08-03 RX ADMIN — ACETAMINOPHEN SCH MG: 500 TABLET ORAL at 10:20

## 2023-08-03 RX ADMIN — MEMANTINE HYDROCHLORIDE SCH MG: 10 TABLET ORAL at 10:19

## 2023-08-03 RX ADMIN — MEMANTINE HYDROCHLORIDE SCH MG: 10 TABLET ORAL at 21:35

## 2023-08-03 RX ADMIN — CYANOCOBALAMIN TAB 500 MCG SCH MCG: 500 TAB at 10:19

## 2023-08-03 RX ADMIN — CARBIDOPA AND LEVODOPA SCH EACH: 25; 100 TABLET ORAL at 21:35

## 2023-08-03 RX ADMIN — HEPARIN SODIUM SCH: 5000 INJECTION INTRAVENOUS; SUBCUTANEOUS at 10:51

## 2023-08-03 RX ADMIN — MELOXICAM SCH MG: 7.5 TABLET ORAL at 10:20

## 2023-08-03 RX ADMIN — THERA TABS SCH EACH: TAB at 10:20

## 2023-08-03 RX ADMIN — CARBIDOPA AND LEVODOPA SCH EACH: 25; 100 TABLET ORAL at 10:19

## 2023-08-03 RX ADMIN — SERTRALINE HYDROCHLORIDE SCH MG: 25 TABLET ORAL at 10:20

## 2023-08-03 NOTE — P.PN
Subjective


Progress Note Date: 08/03/23


This is an 83-year-old male with medical history significant for asthma, 

coronary artery disease with prior cardiac stenting, COPD, diabetes mellitus, 

GERD, hypertension, hyponatremia, DVT, pulmonary embolism, sleep apnea and 

advanced dementia.  Patient is nonverbal at baseline does reside with his wife 

was his primary caregiver.  Patient recently has been getting more aggressive 

with his wife who is his primary caregiver and has been started on Rexulti 

outpatient by his primary care provider.  Patient's wife noted the patient has 

become more sedated after being started on this medication.  He was taken to his

day program where he slept all day.  Patient's wife decided to bring him into 

the ER for further evaluation.  Patient does follow with Dr. LUÍS Patel 

outpatient.  Patient is unable to provide a review of symptoms as he is 

nonverbal and mumbling.  He does not appear to be in any acute distress.  He 

does follow directions.  Patient was admitted to the hospital medicine with a 

consult placed to neurology underwent a brain CT which is negative for any acute

findings.  Chest x-ray is negative.  Patient does not appear to be any distress.

 Lab work is essentially unremarkable, his chloride is 108, BUN 24, creatinine 

0.73, glucose 136, vitamin B12 702, folate 40, TSH 1.150, urinalysis is 

negative.  Blood count panel is completely unremarkable. EEG is currently 

pending and PT/OT have been consulted. 





08/02/2023


Patient is a tall evaluated in the medical floor.  He is more awake and was able

to get up and ambulate with physical therapy.   is on board for 

discharge planning and patient is currently awaiting an accepting facility.  EEG

was completed which showed moderate encephalopathy is no signs of seizure 


activity.





08/03/2023


Patient is evaluated today in the medical floor no acute complaints overnight.  

He did have some acute agitation yesterday afternoon and didn't respond well to 

oral Seroquel and recommended to continue as needed at bedtime for acute agita

tion.  Patient needs to have the lights on and the windows open during the day 

and patient should be up in the chair this is to promote sleep/wake cycle. 

Patients wife essentially unable to care for this patient at home and social 

work following for placement. 





As mentioned above in PCI patient is unable to provided a review of systems.





PHYSICAL EXAMINATION: 





GENERAL: The patient is alert and oriented x0-1, not in any acute distress. Well

developed, well nourished. 


HEENT: Pupils are round and equally reacting to light. EOMI. No scleral icterus.

No conjunctival pallor. Normocephalic, atraumatic. No pharyngeal erythema. No 

thyromegaly. 


CARDIOVASCULAR: S1 and S2 present. No murmurs, rubs, or gallops. 


PULMONARY: Chest is clear to auscultation, no wheezing or crackles. 


ABDOMEN: Soft, nontender, nondistended, normoactive bowel sounds. No palpable 

organomegaly. 


MUSCULOSKELETAL: No joint swelling or deformity.


EXTREMITIES: No cyanosis, clubbing, or pedal edema. 


NEUROLOGICAL: Gross neurological examination did not reveal any focal deficits. 


SKIN: No rashes. 





Assessment


Altered mental status due to toxic  encephalopathy from Rexulti causing an over 

sedative effect Improved


Advanced dementia nonverbal at baseline with behavioral changes follows with a 

neurologist outpatient 


History of coronary artery disease with prior cardiac stenting


History of COPD


Diabetes Mellitus Type 2 


GERD


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 


History of myocardial infarction


Hypertension


Hyperlipidemia


Anxiety/depression/panic disorder


Former smoker


GI prophylaxis on pepcid


DVT prophylaxis Subcu heparin





Plan


Rexulti will be held at this time and consider low dose seroquel for episodes of

acute agitation


D/C in the next 24 hours pending an accepting discharge facility patient not 

able to go home at this time wife unable to care for patient this time he has 

been increasingly weak at home. Social work following and patient has been 

denied at all the local nursing homes and ECF outside local area being 

considered by family. Wife cannot accommodate patient at home. 





The impression and plan of care has been dictated by Rosita Macias Nurse 

Practitioner as directed.





Dr. Karli MD


I have performed a history and physical examination and medical decision making 

of this patient, discussed the same with the dictator, and agree with the 

dictators assessment and plan as written, documented as a scribe. Based on total

visit time, I have performed more than 50% of this visit. 








Objective





- Vital Signs


Vital signs: 


                                   Vital Signs











Temp  98.0 F   08/03/23 13:52


 


Pulse  68   08/03/23 13:52


 


Resp  16   08/03/23 13:52


 


BP  155/76   08/03/23 13:52


 


Pulse Ox  99   08/03/23 13:52


 


FiO2      








                                 Intake & Output











 08/02/23 08/03/23 08/03/23





 18:59 06:59 18:59


 


Intake Total   118


 


Output Total 1100 750 550


 


Balance -1100 -750 -432


 


Intake:   


 


  Oral   118


 


Output:   


 


  Urine 1100 750 550


 


Other:   


 


  Voiding Method Diaper Diaper Diaper





 Incontinent Incontinent Incontinent


 


  # Voids 1  














- Labs


CBC & Chem 7: 


                                 07/31/23 18:25





                                 07/31/23 18:25





Assessment and Plan


Time with Patient: Less than 30

## 2023-08-04 LAB
ANION GAP SERPL CALC-SCNC: 9.5 MMOL/L (ref 4–12)
BASOPHILS # BLD AUTO: 0.01 X 10*3/UL (ref 0–0.1)
BASOPHILS NFR BLD AUTO: 0.1 %
BUN SERPL-SCNC: 9.8 MG/DL (ref 9–27)
BUN/CREAT SERPL: 10.89 RATIO (ref 12–20)
CALCIUM SPEC-MCNC: 9.2 MG/DL (ref 8.7–10.3)
CHLORIDE SERPL-SCNC: 107 MMOL/L (ref 96–109)
CO2 SERPL-SCNC: 24.5 MMOL/L (ref 21.6–31.8)
EOSINOPHIL # BLD AUTO: 0 X 10*3/UL (ref 0.04–0.35)
EOSINOPHIL NFR BLD AUTO: 0 %
ERYTHROCYTE [DISTWIDTH] IN BLOOD BY AUTOMATED COUNT: 4.47 X 10*6/UL (ref 4.4–5.6)
ERYTHROCYTE [DISTWIDTH] IN BLOOD: 13 % (ref 11.5–14.5)
GLUCOSE SERPL-MCNC: 89 MG/DL (ref 70–110)
HCT VFR BLD AUTO: 40.8 % (ref 39.6–50)
HGB BLD-MCNC: 13.4 D/DL (ref 13–17)
IMM GRANULOCYTES BLD QL AUTO: 0.8 %
LYMPHOCYTES # SPEC AUTO: 1.83 X 10*3/UL (ref 0.9–5)
LYMPHOCYTES NFR SPEC AUTO: 24.9 %
MCH RBC QN AUTO: 30 PG (ref 27–32)
MCHC RBC AUTO-ENTMCNC: 32.8 D/DL (ref 32–37)
MCV RBC AUTO: 91.3 FL (ref 80–97)
MONOCYTES # BLD AUTO: 0.87 X 10*3/UL (ref 0.2–1)
MONOCYTES NFR BLD AUTO: 11.9 %
NEUTROPHILS # BLD AUTO: 4.57 X 10*3/UL (ref 1.8–7.7)
NEUTROPHILS NFR BLD AUTO: 62.3 %
NRBC BLD AUTO-RTO: 0 X 10*3/UL (ref 0–0.01)
PLATELET # BLD AUTO: 168 X 10*3/UL (ref 140–440)
POTASSIUM SERPL-SCNC: 4 MMOL/L (ref 3.5–5.5)
SODIUM SERPL-SCNC: 141 MMOL/L (ref 135–145)
WBC # BLD AUTO: 7.34 X 10*3/UL (ref 4.5–10)

## 2023-08-04 PROCEDURE — 4A10X4Z MONITORING OF CENTRAL NERVOUS ELECTRICAL ACTIVITY, EXTERNAL APPROACH: ICD-10-PCS

## 2023-08-04 RX ADMIN — MEMANTINE HYDROCHLORIDE SCH MG: 10 TABLET ORAL at 20:31

## 2023-08-04 RX ADMIN — I-VITE, TAB 1000-60-2MG (60/BT) SCH EACH: TAB at 09:41

## 2023-08-04 RX ADMIN — ASPIRIN 81 MG CHEWABLE TABLET SCH MG: 81 TABLET CHEWABLE at 09:42

## 2023-08-04 RX ADMIN — MEMANTINE HYDROCHLORIDE SCH MG: 10 TABLET ORAL at 09:40

## 2023-08-04 RX ADMIN — DONEPEZIL HYDROCHLORIDE SCH MG: 10 TABLET ORAL at 20:31

## 2023-08-04 RX ADMIN — FAMOTIDINE SCH MG: 20 TABLET, FILM COATED ORAL at 09:42

## 2023-08-04 RX ADMIN — ACETAMINOPHEN SCH MG: 500 TABLET ORAL at 09:41

## 2023-08-04 RX ADMIN — CARBIDOPA AND LEVODOPA SCH EACH: 25; 100 TABLET ORAL at 20:31

## 2023-08-04 RX ADMIN — SERTRALINE HYDROCHLORIDE SCH MG: 25 TABLET ORAL at 20:31

## 2023-08-04 RX ADMIN — HEPARIN SODIUM SCH: 5000 INJECTION, SOLUTION INTRAVENOUS; SUBCUTANEOUS at 10:10

## 2023-08-04 RX ADMIN — CARBIDOPA AND LEVODOPA SCH EACH: 25; 100 TABLET ORAL at 09:40

## 2023-08-04 RX ADMIN — FAMOTIDINE SCH MG: 20 TABLET, FILM COATED ORAL at 20:31

## 2023-08-04 RX ADMIN — MONTELUKAST SODIUM SCH MG: 10 TABLET, FILM COATED ORAL at 20:31

## 2023-08-04 RX ADMIN — CARBIDOPA AND LEVODOPA SCH EACH: 25; 100 TABLET ORAL at 16:05

## 2023-08-04 RX ADMIN — THERA TABS SCH EACH: TAB at 09:41

## 2023-08-04 RX ADMIN — CYANOCOBALAMIN TAB 500 MCG SCH MCG: 500 TAB at 09:41

## 2023-08-04 RX ADMIN — MELOXICAM SCH MG: 7.5 TABLET ORAL at 09:41

## 2023-08-04 RX ADMIN — ATORVASTATIN CALCIUM SCH MG: 10 TABLET, FILM COATED ORAL at 20:31

## 2023-08-04 RX ADMIN — I-VITE, TAB 1000-60-2MG (60/BT) SCH EACH: TAB at 20:31

## 2023-08-04 RX ADMIN — HEPARIN SODIUM SCH UNIT: 5000 INJECTION, SOLUTION INTRAVENOUS; SUBCUTANEOUS at 20:31

## 2023-08-04 RX ADMIN — Medication SCH MCG: at 09:41

## 2023-08-04 NOTE — P.PN
Subjective


Progress Note Date: 08/04/23


This is an 83-year-old male with medical history significant for asthma, 

coronary artery disease with prior cardiac stenting, COPD, diabetes mellitus, 

GERD, hypertension, hyponatremia, DVT, pulmonary embolism, sleep apnea and 

advanced dementia.  Patient is nonverbal at baseline does reside with his wife 

was his primary caregiver.  Patient recently has been getting more aggressive 

with his wife who is his primary caregiver and has been started on Rexulti 

outpatient by his primary care provider.  Patient's wife noted the patient has 

become more sedated after being started on this medication.  He was taken to his

day program where he slept all day.  Patient's wife decided to bring him into 

the ER for further evaluation.  Patient does follow with Dr. LUÍS Patel 

outpatient.  Patient is unable to provide a review of symptoms as he is 

nonverbal and mumbling.  He does not appear to be in any acute distress.  He 

does follow directions.  Patient was admitted to the hospital medicine with a 

consult placed to neurology underwent a brain CT which is negative for any acute

findings.  Chest x-ray is negative.  Patient does not appear to be any distress.

 Lab work is essentially unremarkable, his chloride is 108, BUN 24, creatinine 

0.73, glucose 136, vitamin B12 702, folate 40, TSH 1.150, urinalysis is 

negative.  Blood count panel is completely unremarkable. EEG is currently 

pending and PT/OT have been consulted. 





08/02/2023


Patient is a tall evaluated in the medical floor.  He is more awake and was able

to get up and ambulate with physical therapy.   is on board for 

discharge planning and patient is currently awaiting an accepting facility.  EEG

was completed which showed moderate encephalopathy is no signs of seizure 


activity.





08/03/2023


Patient is evaluated today in the medical floor no acute complaints overnight.  

He did have some acute agitation yesterday afternoon and didn't respond well to 

oral Seroquel and recommended to continue as needed at bedtime for acute agita

tion.  Patient needs to have the lights on and the windows open during the day 

and patient should be up in the chair this is to promote sleep/wake cycle. 

Patients wife essentially unable to care for this patient at home and social 

work following for placement. 





08/04/2023


Patient is resting in bed comfortably in no acute complaints.  Although he is 

unable to speak clearly he does give a thumbs-up when asked if he is feeling 

okay.  He is continued on seroquel at  which recommend to continue. Patient 

has an accepting facility [facility] in [place], patient does have medicare and 

will require a 3 night inpatient stay prior to discharge and D/C is now planned 

for Monday and facility can accept patient on Monday. He remains hemodynamically

stable.  Lab Work completed today reveals an unremarkable blood count panel, 

sodium 141, potassium 4.0, chloride 107, BUN 9.8, creatinine 0.9.  Blood glucose

89.





As mentioned above in PCI patient is unable to provided a review of systems.





PHYSICAL EXAMINATION: 





GENERAL: The patient is alert and oriented x0-1, not in any acute distress. Well

developed, well nourished. 


HEENT: Pupils are round and equally reacting to light. EOMI. No scleral icterus.

No conjunctival pallor. Normocephalic, atraumatic. No pharyngeal erythema. No 

thyromegaly. 


CARDIOVASCULAR: S1 and S2 present. No murmurs, rubs, or gallops. 


PULMONARY: Chest is clear to auscultation, no wheezing or crackles. 


ABDOMEN: Soft, nontender, nondistended, normoactive bowel sounds. No palpable 

organomegaly. 


MUSCULOSKELETAL: No joint swelling or deformity.


EXTREMITIES: No cyanosis, clubbing, or pedal edema. 


NEUROLOGICAL: Gross neurological examination did not reveal any focal deficits. 


SKIN: No rashes. 





Assessment


Altered mental status due to toxic  encephalopathy from Rexulti causing an over 

sedative effect this is resolved


Advanced dementia nonverbal at baseline with behavioral changes follows with a 

neurologist outpatient 


History of coronary artery disease with prior cardiac stenting


History of COPD


Diabetes Mellitus Type 2 


GERD


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 


History of myocardial infarction


Hypertension


Hyperlipidemia


Anxiety/depression/panic disorder


Former smoker


GI prophylaxis on pepcid


DVT prophylaxis Subcu heparin





Plan


Rexulti will be held at this time and he will be continued low dose seroquel for

episodes of acute agitation and also at bedtime


[facility] [place] and [facility] will be able to 

accommodate patient on Monday additionally patient has Medicare and does require

a 3 night inpatient stay prior to discharge.  Again this will be on Monday.





The impression and plan of care has been dictated by Rosita Macias, Nurse 

Practitioner as directed.





Dr. Karli MD


I have performed a history and physical examination and medical decision making 

of this patient, discussed the same with the dictator, and agree with the 

dictators assessment and plan as written, documented as a scribe. Based on total

visit time, I have performed more than 50% of this visit. 








Objective





- Vital Signs


Vital signs: 


                                   Vital Signs











Temp  97.5 F L  08/04/23 07:00


 


Pulse  59 L  08/04/23 07:00


 


Resp  16   08/04/23 07:00


 


BP  167/88   08/04/23 07:00


 


Pulse Ox  98   08/04/23 07:00


 


FiO2      








                                 Intake & Output











 08/03/23 08/04/23 08/04/23





 18:59 06:59 18:59


 


Intake Total 306  


 


Output Total 550  


 


Balance -244  


 


Intake:   


 


  Oral 306  


 


Output:   


 


  Urine 550  


 


Other:   


 


  Voiding Method Diaper Diaper 





 Incontinent Incontinent 


 


  # Voids  3 














- Labs


CBC & Chem 7: 


                                 08/04/23 06:19





                                 08/04/23 06:19


Labs: 


                  Abnormal Lab Results - Last 24 Hours (Table)











  08/04/23 08/04/23 Range/Units





  06:19 06:19 


 


Eosinophils #  0 L   (0.04-0.35)  X 10*3/uL


 


BUN/Creatinine Ratio   10.89 L  (12.00-20.00)  Ratio














Assessment and Plan


Time with Patient: Less than 30

## 2023-08-05 RX ADMIN — DONEPEZIL HYDROCHLORIDE SCH MG: 10 TABLET ORAL at 20:48

## 2023-08-05 RX ADMIN — CYANOCOBALAMIN TAB 500 MCG SCH MCG: 500 TAB at 09:15

## 2023-08-05 RX ADMIN — I-VITE, TAB 1000-60-2MG (60/BT) SCH EACH: TAB at 20:49

## 2023-08-05 RX ADMIN — ACETAMINOPHEN SCH MG: 500 TABLET ORAL at 09:15

## 2023-08-05 RX ADMIN — CARBIDOPA AND LEVODOPA SCH EACH: 25; 100 TABLET ORAL at 20:48

## 2023-08-05 RX ADMIN — CARBIDOPA AND LEVODOPA SCH EACH: 25; 100 TABLET ORAL at 16:15

## 2023-08-05 RX ADMIN — SERTRALINE HYDROCHLORIDE SCH MG: 25 TABLET ORAL at 20:49

## 2023-08-05 RX ADMIN — HEPARIN SODIUM SCH UNIT: 5000 INJECTION, SOLUTION INTRAVENOUS; SUBCUTANEOUS at 09:14

## 2023-08-05 RX ADMIN — THERA TABS SCH EACH: TAB at 09:16

## 2023-08-05 RX ADMIN — ASPIRIN 81 MG CHEWABLE TABLET SCH MG: 81 TABLET CHEWABLE at 09:15

## 2023-08-05 RX ADMIN — FAMOTIDINE SCH MG: 20 TABLET, FILM COATED ORAL at 09:15

## 2023-08-05 RX ADMIN — MELOXICAM SCH MG: 7.5 TABLET ORAL at 09:15

## 2023-08-05 RX ADMIN — MONTELUKAST SODIUM SCH MG: 10 TABLET, FILM COATED ORAL at 20:48

## 2023-08-05 RX ADMIN — HEPARIN SODIUM SCH UNIT: 5000 INJECTION, SOLUTION INTRAVENOUS; SUBCUTANEOUS at 20:47

## 2023-08-05 RX ADMIN — MEMANTINE HYDROCHLORIDE SCH MG: 10 TABLET ORAL at 09:15

## 2023-08-05 RX ADMIN — CARBIDOPA AND LEVODOPA SCH EACH: 25; 100 TABLET ORAL at 09:15

## 2023-08-05 RX ADMIN — FAMOTIDINE SCH MG: 20 TABLET, FILM COATED ORAL at 20:48

## 2023-08-05 RX ADMIN — Medication SCH MCG: at 09:15

## 2023-08-05 RX ADMIN — ATORVASTATIN CALCIUM SCH MG: 10 TABLET, FILM COATED ORAL at 20:48

## 2023-08-05 RX ADMIN — I-VITE, TAB 1000-60-2MG (60/BT) SCH EACH: TAB at 09:17

## 2023-08-05 RX ADMIN — MEMANTINE HYDROCHLORIDE SCH MG: 10 TABLET ORAL at 20:48

## 2023-08-05 NOTE — P.PN
Subjective


Progress Note Date: 08/05/23


This is an 83-year-old male with medical history significant for asthma, 

coronary artery disease with prior cardiac stenting, COPD, diabetes mellitus, 

GERD, hypertension, hyponatremia, DVT, pulmonary embolism, sleep apnea and 

advanced dementia.  Patient is nonverbal at baseline does reside with his wife 

was his primary caregiver.  Patient recently has been getting more aggressive 

with his wife who is his primary caregiver and has been started on Rexulti 

outpatient by his primary care provider.  Patient's wife noted the patient has 

become more sedated after being started on this medication.  He was taken to his

day program where he slept all day.  Patient's wife decided to bring him into 

the ER for further evaluation.  Patient does follow with Dr. LUÍS Patel 

outpatient.  Patient is unable to provide a review of symptoms as he is 

nonverbal and mumbling.  He does not appear to be in any acute distress.  He 

does follow directions.  Patient was admitted to the hospital medicine with a 

consult placed to neurology underwent a brain CT which is negative for any acute

findings.  Chest x-ray is negative.  Patient does not appear to be any distress.

 Lab work is essentially unremarkable, his chloride is 108, BUN 24, creatinine 

0.73, glucose 136, vitamin B12 702, folate 40, TSH 1.150, urinalysis is 

negative.  Blood count panel is completely unremarkable. EEG is currently 

pending and PT/OT have been consulted. 





08/02/2023


Patient is a tall evaluated in the medical floor.  He is more awake and was able

to get up and ambulate with physical therapy.   is on board for 

discharge planning and patient is currently awaiting an accepting facility.  EEG

was completed which showed moderate encephalopathy is no signs of seizure 


activity.





08/03/2023


Patient is evaluated today in the medical floor no acute complaints overnight.  

He did have some acute agitation yesterday afternoon and didn't respond well to 

oral Seroquel and recommended to continue as needed at bedtime for acute agita

tion.  Patient needs to have the lights on and the windows open during the day 

and patient should be up in the chair this is to promote sleep/wake cycle. 

Patients wife essentially unable to care for this patient at home and social 

work following for placement. 





08/04/2023


Patient is resting in bed comfortably in no acute complaints.  Although he is 

unable to speak clearly he does give a thumbs-up when asked if he is feeling 

okay.  He is continued on seroquel at  which recommend to continue. Patient 

[place] [facility] [place], patient does have medicare and 

will require a 3 night inpatient stay prior to discharge and D/C is now planned 

for Monday and facility can accept patient on Monday. He remains hemodynamically

stable.  Lab Work completed today reveals an unremarkable blood count panel, 

sodium 141, potassium 4.0, chloride 107, BUN 9.8, creatinine 0.9.  Blood glucose

89.





08/05/2023


Patient evaluated today resting in bed. He is receiving seroquel at . He is 

sleepy this AM and we will decrease the dose of seroquel. Patient should have 

lights on and blinds open during the day. He is hemodynamically stable, afebrile

and on room air. Ok to leave IV out and patient off telemetry. He is pending DC 

monday to ECF. 





As mentioned above in PCI patient is unable to provided a review of systems.





PHYSICAL EXAMINATION: 





GENERAL: The patient is alert and oriented x0-1, not in any acute distress. Well

developed, well nourished. 


HEENT: Pupils are round and equally reacting to light. EOMI. No scleral icterus.

No conjunctival pallor. Normocephalic, atraumatic. No pharyngeal erythema. No 

thyromegaly. 


CARDIOVASCULAR: S1 and S2 present. No murmurs, rubs, or gallops. 


PULMONARY: Chest is clear to auscultation, no wheezing or crackles. 


ABDOMEN: Soft, nontender, nondistended, normoactive bowel sounds. No palpable 

organomegaly. 


MUSCULOSKELETAL: No joint swelling or deformity.


EXTREMITIES: No cyanosis, clubbing, or pedal edema. 


NEUROLOGICAL: Gross neurological examination did not reveal any focal deficits. 


SKIN: No rashes. 





Assessment


Altered mental status due to toxic  encephalopathy from Rexulti causing an over 

sedative effect this is resolved


Advanced dementia nonverbal at baseline with behavioral changes follows with a 

neurologist outpatient 


History of coronary artery disease with prior cardiac stenting


History of COPD


Diabetes Mellitus Type 2 


GERD


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 


History of myocardial infarction


Hypertension


Hyperlipidemia


Anxiety/depression/panic disorder


Former smoker


GI prophylaxis on pepcid


DVT prophylaxis Subcu heparin





Plan


Rexulti will be held at this time and he will be continued low dose seroquel for

episodes of acute agitation and also at bedtime


Patient has been [facility] in [place] and facility will be able to 

accommodate patient on Monday additionally patient has Medicare and does require

a 3 night inpatient stay prior to discharge.  Again this will be on Monday.





The impression and plan of care has been dictated by Rosita Macias, Nurse 

Practitioner as directed.





Dr. Karli MD


I have performed a history and physical examination and medical decision making 

of this patient, discussed the same with the dictator, and agree with the 

dictators assessment and plan as written, documented as a scribe. Based on total

visit time, I have performed more than 50% of this visit. 








Objective





- Vital Signs


Vital signs: 


                                   Vital Signs











Temp  97.9 F   08/05/23 08:05


 


Pulse  67   08/05/23 08:05


 


Resp  15   08/05/23 08:05


 


BP  152/78   08/05/23 08:05


 


Pulse Ox  98   08/05/23 08:05


 


FiO2      








                                 Intake & Output











 08/04/23 08/05/23 08/05/23





 18:59 06:59 18:59


 


Output Total 1000  


 


Balance -1000  


 


Output:   


 


  Urine 1000  


 


Other:   


 


  Voiding Method  Diaper Diaper





  Incontinent Incontinent





  External Catheter 


 


  # Voids  2 














- Labs


CBC & Chem 7: 


                                 08/04/23 06:19





                                 08/04/23 06:19





Assessment and Plan


Time with Patient: Less than 30

## 2023-08-06 RX ADMIN — I-VITE, TAB 1000-60-2MG (60/BT) SCH EACH: TAB at 08:44

## 2023-08-06 RX ADMIN — FAMOTIDINE SCH MG: 20 TABLET, FILM COATED ORAL at 21:17

## 2023-08-06 RX ADMIN — ASPIRIN 81 MG CHEWABLE TABLET SCH MG: 81 TABLET CHEWABLE at 08:42

## 2023-08-06 RX ADMIN — I-VITE, TAB 1000-60-2MG (60/BT) SCH EACH: TAB at 21:18

## 2023-08-06 RX ADMIN — MEMANTINE HYDROCHLORIDE SCH MG: 10 TABLET ORAL at 21:17

## 2023-08-06 RX ADMIN — CARBIDOPA AND LEVODOPA SCH EACH: 25; 100 TABLET ORAL at 16:51

## 2023-08-06 RX ADMIN — ATORVASTATIN CALCIUM SCH MG: 10 TABLET, FILM COATED ORAL at 21:17

## 2023-08-06 RX ADMIN — CARBIDOPA AND LEVODOPA SCH EACH: 25; 100 TABLET ORAL at 21:18

## 2023-08-06 RX ADMIN — SERTRALINE HYDROCHLORIDE SCH MG: 25 TABLET ORAL at 21:18

## 2023-08-06 RX ADMIN — THERA TABS SCH EACH: TAB at 08:44

## 2023-08-06 RX ADMIN — HEPARIN SODIUM SCH UNIT: 5000 INJECTION, SOLUTION INTRAVENOUS; SUBCUTANEOUS at 21:17

## 2023-08-06 RX ADMIN — MONTELUKAST SODIUM SCH MG: 10 TABLET, FILM COATED ORAL at 21:18

## 2023-08-06 RX ADMIN — ACETAMINOPHEN SCH MG: 500 TABLET ORAL at 08:44

## 2023-08-06 RX ADMIN — CYANOCOBALAMIN TAB 500 MCG SCH MCG: 500 TAB at 08:42

## 2023-08-06 RX ADMIN — Medication SCH MCG: at 08:44

## 2023-08-06 RX ADMIN — MEMANTINE HYDROCHLORIDE SCH MG: 10 TABLET ORAL at 08:45

## 2023-08-06 RX ADMIN — MELOXICAM SCH MG: 7.5 TABLET ORAL at 08:42

## 2023-08-06 RX ADMIN — DONEPEZIL HYDROCHLORIDE SCH MG: 10 TABLET ORAL at 21:17

## 2023-08-06 RX ADMIN — FAMOTIDINE SCH MG: 20 TABLET, FILM COATED ORAL at 08:44

## 2023-08-06 RX ADMIN — CARBIDOPA AND LEVODOPA SCH EACH: 25; 100 TABLET ORAL at 08:42

## 2023-08-06 RX ADMIN — HEPARIN SODIUM SCH UNIT: 5000 INJECTION, SOLUTION INTRAVENOUS; SUBCUTANEOUS at 08:42

## 2023-08-06 NOTE — P.PN
Subjective


Progress Note Date: 08/06/23


This is an 83-year-old male with medical history significant for asthma, 

coronary artery disease with prior cardiac stenting, COPD, diabetes mellitus, 

GERD, hypertension, hyponatremia, DVT, pulmonary embolism, sleep apnea and 

advanced dementia.  Patient is nonverbal at baseline does reside with his wife 

was his primary caregiver.  Patient recently has been getting more aggressive 

with his wife who is his primary caregiver and has been started on Rexulti 

outpatient by his primary care provider.  Patient's wife noted the patient has 

become more sedated after being started on this medication.  He was taken to his

day program where he slept all day.  Patient's wife decided to bring him into 

the ER for further evaluation.  Patient does follow with Dr. LUÍS Patel 

outpatient.  Patient is unable to provide a review of symptoms as he is 

nonverbal and mumbling.  He does not appear to be in any acute distress.  He 

does follow directions.  Patient was admitted to the hospital medicine with a 

consult placed to neurology underwent a brain CT which is negative for any acute

findings.  Chest x-ray is negative.  Patient does not appear to be any distress.

 Lab work is essentially unremarkable, his chloride is 108, BUN 24, creatinine 

0.73, glucose 136, vitamin B12 702, folate 40, TSH 1.150, urinalysis is 

negative.  Blood count panel is completely unremarkable. EEG is currently 

pending and PT/OT have been consulted. 





08/02/2023


Patient is a tall evaluated in the medical floor.  He is more awake and was able

to get up and ambulate with physical therapy.   is on board for 

discharge planning and patient is currently awaiting an accepting facility.  EEG

was completed which showed moderate encephalopathy is no signs of seizure 


activity.





08/03/2023


Patient is evaluated today in the medical floor no acute complaints overnight.  

He did have some acute agitation yesterday afternoon and didn't respond well to 

oral Seroquel and recommended to continue as needed at bedtime for acute agita

tion.  Patient needs to have the lights on and the windows open during the day 

and patient should be up in the chair this is to promote sleep/wake cycle. 

Patients wife essentially unable to care for this patient at home and social 

work following for placement. 





08/04/2023


Patient is resting in bed comfortably in no acute complaints.  Although he is 

unable to speak clearly he does give a thumbs-up when asked if he is feeling 

okay.  He is continued on seroquel at  which recommend to continue. Patient 

has an accepting facility [facility] in [place], patient does have medicare and 

will require a 3 night inpatient stay prior to discharge and D/C is now planned 

for Monday and facility can accept patient on Monday. He remains hemodynamically

stable.  Lab Work completed today reveals an unremarkable blood count panel, 

sodium 141, potassium 4.0, chloride 107, BUN 9.8, creatinine 0.9.  Blood glucose

89.





08/05/2023


Patient evaluated today resting in bed. He is receiving seroquel at . He is 

sleepy this AM and we will decrease the dose of seroquel. Patient should have 

lights on and blinds open during the day. He is hemodynamically stable, afebrile

and on room air. Ok to leave IV out and patient off telemetry. He is pending DC 

monday to [facility]. 





08/06/23


Patient is evaluated today resting in bed he has no acute complaints.  He is 

receiving Seroquel at bedtime.  He is more awake during the day today.  There is

no safety center at the bedside.  He remains outpatient soft telemetry.  Patient

should be accepted at [facility] tomorrow.





As mentioned above in PCI patient is unable to provided a review of systems.





PHYSICAL EXAMINATION: 





GENERAL: The patient is alert and oriented x0-1, not in any acute distress. Well

developed, well nourished. 


HEENT: Pupils are round and equally reacting to light. EOMI. No scleral icterus.

No conjunctival pallor. Normocephalic, atraumatic. No pharyngeal erythema. No 

thyromegaly. 


CARDIOVASCULAR: S1 and S2 present. No murmurs, rubs, or gallops. 


PULMONARY: Chest is clear to auscultation, no wheezing or crackles. 


ABDOMEN: Soft, nontender, nondistended, normoactive bowel sounds. No palpable 

organomegaly. 


MUSCULOSKELETAL: No joint swelling or deformity.


EXTREMITIES: No cyanosis, clubbing, or pedal edema. 


NEUROLOGICAL: Gross neurological examination did not reveal any focal deficits. 


SKIN: No rashes. 





Assessment


Altered mental status due to toxic  encephalopathy from Rexulti causing an over 

sedative effect this is resolved


Advanced dementia nonverbal at baseline with behavioral changes follows with a 

neurologist outpatient 


History of coronary artery disease with prior cardiac stenting


History of COPD


Diabetes Mellitus Type 2 


GERD


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 


History of myocardial infarction


Hypertension


Hyperlipidemia


Anxiety/depression/panic disorder


Former smoker


GI prophylaxis on pepcid


DVT prophylaxis Subcu heparin





Plan


Rexulti will be held at this time and he will be continued low dose seroquel for

episodes of acute agitation and also at bedtime


[facility] has [facility] in [place] and facility will be able to 

accommodate patient on Monday additionally patient has Medicare and does require

a 3 night inpatient stay prior to discharge.  Again this will be on Monday.





The impression and plan of care has been dictated by Rosita Macias Nurse 

Practitioner as directed.





Dr. Karli MD


I have performed a history and physical examination and medical decision making 

of this patient, discussed the same with the dictator, and agree with the 

dictators assessment and plan as written, documented as a scribe. Based on total

visit time, I have performed more than 50% of this visit. 








Objective





- Vital Signs


Vital signs: 


                                   Vital Signs











Temp  98.1 F   08/06/23 13:44


 


Pulse  63   08/06/23 13:44


 


Resp  18   08/06/23 13:44


 


BP  151/70   08/06/23 13:44


 


Pulse Ox  91 L  08/06/23 13:44


 


FiO2      








                                 Intake & Output











 08/05/23 08/06/23 08/06/23





 18:59 06:59 18:59


 


Output Total  100 


 


Balance  -100 


 


Output:   


 


  Urine  100 


 


Other:   


 


  Voiding Method Diaper Diaper Diaper





 Incontinent Incontinent Incontinent





  External Catheter 


 


  # Voids 4 4 2


 


  # Bowel Movements 1  1














- Labs


CBC & Chem 7: 


                                 08/04/23 06:19





                                 08/04/23 06:19





Assessment and Plan


Time with Patient: Less than 30

## 2023-08-07 VITALS
RESPIRATION RATE: 18 BRPM | DIASTOLIC BLOOD PRESSURE: 61 MMHG | HEART RATE: 55 BPM | TEMPERATURE: 98.2 F | SYSTOLIC BLOOD PRESSURE: 125 MMHG

## 2023-08-07 LAB — GLUCOSE BLD-MCNC: 123 MG/DL (ref 70–110)

## 2023-08-07 RX ADMIN — Medication SCH MCG: at 08:16

## 2023-08-07 RX ADMIN — MELOXICAM SCH MG: 7.5 TABLET ORAL at 08:17

## 2023-08-07 RX ADMIN — I-VITE, TAB 1000-60-2MG (60/BT) SCH EACH: TAB at 12:47

## 2023-08-07 RX ADMIN — HEPARIN SODIUM SCH UNIT: 5000 INJECTION, SOLUTION INTRAVENOUS; SUBCUTANEOUS at 08:16

## 2023-08-07 RX ADMIN — FAMOTIDINE SCH MG: 20 TABLET, FILM COATED ORAL at 08:16

## 2023-08-07 RX ADMIN — MEMANTINE HYDROCHLORIDE SCH MG: 10 TABLET ORAL at 08:16

## 2023-08-07 RX ADMIN — CARBIDOPA AND LEVODOPA SCH EACH: 25; 100 TABLET ORAL at 08:14

## 2023-08-07 RX ADMIN — THERA TABS SCH EACH: TAB at 08:16

## 2023-08-07 RX ADMIN — ACETAMINOPHEN SCH MG: 500 TABLET ORAL at 08:16

## 2023-08-07 RX ADMIN — CYANOCOBALAMIN TAB 500 MCG SCH MCG: 500 TAB at 08:14

## 2023-08-07 RX ADMIN — ASPIRIN 81 MG CHEWABLE TABLET SCH MG: 81 TABLET CHEWABLE at 08:16

## 2023-08-07 NOTE — P.DS
Providers


Date of admission: 


08/04/23 10:18





Expected date of discharge: 08/07/23


Attending physician: 


Jerald Martinez





Consults: 





                                        





07/31/23 22:50


Consult Physician Urgent 


   Consulting Provider: João Mack


   Consult Reason/Comments: Acute encephalopathy, history of dementia


   Do you want consulting provider notified?: Yes











Primary care physician: 


Elisa Cobos





Hospital Course: 











Final diagnosis





Altered mental status due to toxic encephalopathy from Rexulti causing an over 

sedative effect this is resolved


Advanced dementia nonverbal at baseline with behavioral changes follows with a 

neurologist outpatient 


History of coronary artery disease with prior cardiac stenting


History of COPD


Diabetes Mellitus Type 2 


GERD


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 


History of myocardial infarction


Hypertension


Hyperlipidemia


Anxiety/depression/panic disorder


Former smoker


GI prophylaxis on pepcid


DVT prophylaxis Subcu heparin


Full code











Discharge disposition


Patient is being discharged in a stable condition with guarded prognosis to 

[facility].  Patient will follow-up with Dr. Cobos  in the outpatient 

setting upon discharge. Total time taken is greater than 35 minutes.





Hospital course


This is a 83-year-old male who was recently admitted with becoming more 

aggressive and his wife is his caregiver and he also had become more sedated 

after being started on Rexulti and being closely monitored.  Patient was also 

evaluated by neurology and follows with Dr. Patel outpatient.  Recommend 

holding this medication and will continue with as needed Seroquel at night.  

Patient with significant weakness evaluated by physical therapy recommending 

rehab and patient's wife agreeable.  Patient has been accepted at [facility] and will be discharged today.  Recommend dysphagia 3 chopped diet and 

aspiration precautions with head of the bed elevated 30-45 at all times and 

supervision with meals. Currently no reports of chest pain, shortness of breath,

or palpitations.  Patient is afebrile.  No reports of nausea or vomiting and 

patient is tolerating diet.  Patient will be discharged [facility] 

today.  Guarded prognosis





Physical exam:








Gen: This is a 83-year-old male who is awake, alert and oriented 0-1, baseline,

well-developed, well-nourished, elderly-appearing


HEENT: Head is atraumatic, normocephalic. Pupils equal, round. Sclerae is anic

teric. 


NECK: Supple. No JVD. No lymphadenopathy. No thyromegaly. 


LUNGS: Clear to auscultation. No wheezes or rhonchi.  No intercostal 

retractions.


HEART: S1, S2 are muffled


ABDOMEN: Soft. Bowel sounds are present. No masses.  No tenderness.


EXTREMITIES: No pedal edema.  No calf tenderness.


NEUROLOGICAL: Patient is awake, alert and oriented x0-1.  No focal deficits 

noted.  Diffusely weak





Please refer to medication reconciliation sheet for a list of medications.





The impression and plan of care has been dictated by Lali Garcia, Nurse 

Practitioner as directed.





Dr. Juan MD


I have performed a history and examination and MDM of this patient, discussed 

the same with the dictator, and  agree with the dictator's assessment and plan 

as written ,documented as a scribe. Based on total visit time,  I have performed

more than 50% of the visit.  


Patient Condition at Discharge: Stable





Plan - Discharge Summary


New Discharge Prescriptions: 


New


   QUEtiapine [SEROquel] 12.5 mg PO HS PRN #30 tab


     PRN Reason: Agitation


   lisinopriL [Zestril] 5 mg PO BID  tab





Continue


   metFORMIN HCL [Glucophage] 500 mg PO HS


   Famotidine 40 mg PO BID


   atenoloL [Tenormin] 25 mg PO DAILY


   Aspirin 81 mg PO DAILY


   Albuterol Inhaler [Ventolin Hfa Inhaler] 1 puff INHALATION RT-Q6H PRN


     PRN Reason: Shortness Of Breath


   Cyanocobalamin [Vitamin B-12] 250 mcg PO DAILY


   Lovastatin [Mevacor] 40 mg PO HS


   Multivitamins, Thera [Multivitamin (formulary)] 1 tab PO DAILY


   Montelukast [Singulair] 10 mg PO HS


   Meloxicam 15 mg PO DAILY


   Donepezil [Aricept] 10 mg PO HS


   Memantine [Namenda] 10 mg PO BID


   traZODone HCL 50 mg PO HS


   Sertraline [Zoloft] 75 mg PO HS


   Benralizumab [Fasenra] 30 mg SQ Q56D


   busPIRone HCl [Buspar] 10 mg PO DAILY


   Vit C/E/Zn/Coppr/Lutein/Zeaxan [Preservision Areds 2 Softgel] 1 cap PO BID


   Carbidopa-Levodopa  mg [Sinemet  mg] 1 tab PO TID


   Acetaminophen [Tylenol Extra Strength] 500 mg PO DAILY


   Nitroglycerin Sl Tabs [Nitrostat] 0.4 mg SUBLINGUAL Q5M PRN #30 tab


     PRN Reason: Chest Pain


   Cholecalciferol [Vitamin D3 (25 Mcg = 1000 Iu)] 25 mcg PO DAILY





Discontinued


   lisinopriL [Zestril] 2.5 mg PO HS


   LORazepam [Ativan] 0.5 mg PO BID


Discharge Medication List





Famotidine 40 mg PO BID 09/30/14 [History]


atenoloL [Tenormin] 25 mg PO DAILY 09/30/14 [History]


metFORMIN HCL [Glucophage] 500 mg PO HS 09/30/14 [History]


Aspirin 81 mg PO DAILY 03/25/16 [History]


Albuterol Inhaler [Ventolin Hfa Inhaler] 1 puff INHALATION RT-Q6H PRN 07/18/16 

[History]


Cyanocobalamin [Vitamin B-12] 250 mcg PO DAILY 07/18/16 [History]


Lovastatin [Mevacor] 40 mg PO HS 09/19/16 [History]


Meloxicam 15 mg PO DAILY 12/14/17 [History]


Montelukast [Singulair] 10 mg PO HS 12/14/17 [History]


Multivitamins, Thera [Multivitamin (formulary)] 1 tab PO DAILY 12/14/17 

[History]


Donepezil [Aricept] 10 mg PO HS 07/22/18 [History]


Memantine [Namenda] 10 mg PO BID 07/22/18 [History]


traZODone HCL 50 mg PO HS 06/08/19 [History]


Sertraline [Zoloft] 75 mg PO HS 01/22/20 [History]


Benralizumab [Fasenra] 30 mg SQ Q56D 03/28/20 [History]


Carbidopa-Levodopa  mg [Sinemet  mg] 1 tab PO TID 08/24/22 [History]


Vit C/E/Zn/Coppr/Lutein/Zeaxan [Preservision Areds 2 Softgel] 1 cap PO BID 

08/24/22 [History]


busPIRone HCl [Buspar] 10 mg PO DAILY 08/24/22 [History]


Nitroglycerin Sl Tabs [Nitrostat] 0.4 mg SUBLINGUAL Q5M PRN #30 tab 08/25/22 

[Rx]


Cholecalciferol [Vitamin D3 (25 Mcg = 1000 Iu)] 25 mcg PO DAILY 12/09/22 

[History]


Acetaminophen [Tylenol Extra Strength] 500 mg PO DAILY 07/31/23 [History]


QUEtiapine [SEROquel] 12.5 mg PO HS PRN #30 tab 08/03/23 [Rx]


lisinopriL [Zestril] 5 mg PO BID  tab 08/07/23 [Rx]








Follow up Appointment(s)/Referral(s): 


Elisa Cobos MD [Primary Care Provider] - 1-2 days


Activity/Diet/Wound Care/Special Instructions: 


Pending an accepting facility for discharge medically patient is clear.

## 2024-12-28 NOTE — XR
EXAMINATION TYPE: XR chest 2V

 

DATE OF EXAM: 5/3/2023

 

COMPARISON: Prior chest x-ray December 9, 2022.

 

HISTORY: Altered mental status and weakness.

 

TECHNIQUE:  Frontal and lateral views of the chest are obtained.

 

FINDINGS:  There is no suspicious new focal air space opacity, pleural effusion, or pneumothorax seen
.  The cardiac silhouette size is mildly enlarged with atherosclerotic change thoracic aorta. Multile
alan spurring in thoracic spine redemonstrated.

 

IMPRESSION:  Mild cardiomegaly without acute pulmonary process. 28-Dec-2024 21:40 27-Dec-2024 13:17